# Patient Record
Sex: MALE | Race: WHITE | Employment: OTHER | URBAN - METROPOLITAN AREA
[De-identification: names, ages, dates, MRNs, and addresses within clinical notes are randomized per-mention and may not be internally consistent; named-entity substitution may affect disease eponyms.]

---
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
## 2017-05-01 PROBLEM — S01.01XA SCALP LACERATION: Status: ACTIVE | Noted: 2017-05-01

## 2017-05-02 PROBLEM — S22.000A CLOSED COMPRESSION FRACTURE OF THORACIC VERTEBRA (HCC): Chronic | Status: ACTIVE | Noted: 2017-05-02

## 2017-05-02 PROBLEM — I10 HTN (HYPERTENSION), BENIGN: Chronic | Status: ACTIVE | Noted: 2017-05-02

## 2017-05-02 PROBLEM — I65.29 CAROTID STENOSIS: Chronic | Status: ACTIVE | Noted: 2017-05-02

## 2017-05-02 PROBLEM — Z86.711 HISTORY OF PULMONARY EMBOLISM: Chronic | Status: ACTIVE | Noted: 2017-05-02

## 2017-05-02 PROBLEM — E06.3 AUTOIMMUNE HYPOTHYROIDISM: Chronic | Status: ACTIVE | Noted: 2017-05-02

## 2018-07-09 ENCOUNTER — HOSPITAL ENCOUNTER (OUTPATIENT)
Age: 83
Discharge: HOME | End: 2018-07-11

## 2018-07-09 LAB
PROSTATE SPECIFIC ANTIGEN: 2.58 NG/ML (ref 0–4)
TESTOSTERONE TOTAL: <2.5 NG/DL

## 2018-07-09 PROCEDURE — 84153 ASSAY OF PSA TOTAL: CPT

## 2018-07-09 PROCEDURE — 84403 ASSAY OF TOTAL TESTOSTERONE: CPT

## 2019-01-14 ENCOUNTER — HOSPITAL ENCOUNTER (OUTPATIENT)
Age: 84
Discharge: HOME | End: 2019-01-16
Payer: MEDICARE

## 2019-01-14 LAB
PROSTATE SPECIFIC ANTIGEN: 2.56 NG/ML (ref 0–4)
TESTOSTERONE TOTAL: <2.5 NG/DL

## 2019-01-14 PROCEDURE — 84153 ASSAY OF PSA TOTAL: CPT

## 2019-01-14 PROCEDURE — 84403 ASSAY OF TOTAL TESTOSTERONE: CPT

## 2019-06-26 ENCOUNTER — HOSPITAL ENCOUNTER (OUTPATIENT)
Age: 84
Discharge: HOME | End: 2019-06-28
Payer: MEDICARE

## 2019-06-26 LAB — PROSTATE SPECIFIC ANTIGEN: 3.6 NG/ML (ref 0–4)

## 2019-06-26 PROCEDURE — 84153 ASSAY OF PSA TOTAL: CPT

## 2019-07-31 RX ORDER — LISINOPRIL 5 MG/1
5 TABLET ORAL DAILY
Qty: 30 TABLET | Refills: 5 | Status: SHIPPED | OUTPATIENT
Start: 2019-07-31 | End: 2019-10-22 | Stop reason: SDUPTHER

## 2019-07-31 RX ORDER — APIXABAN 2.5 MG/1
2.5 TABLET, FILM COATED ORAL
Refills: 3 | COMMUNITY
Start: 2019-04-29 | End: 2019-07-31 | Stop reason: SDUPTHER

## 2019-07-31 NOTE — TELEPHONE ENCOUNTER
Patient requesting refills for meds. Previous Chr[facility] pt. Has not scheduled with new PCP yet. Meds pended to you.

## 2019-08-26 ENCOUNTER — TELEPHONE (OUTPATIENT)
Dept: ADMINISTRATIVE | Age: 84
End: 2019-08-26

## 2019-08-26 ENCOUNTER — HOSPITAL ENCOUNTER (OUTPATIENT)
Age: 84
Discharge: HOME | End: 2019-08-28
Payer: MEDICARE

## 2019-08-26 ENCOUNTER — OFFICE VISIT (OUTPATIENT)
Dept: FAMILY MEDICINE CLINIC | Age: 84
End: 2019-08-26
Payer: MEDICARE

## 2019-08-26 VITALS
SYSTOLIC BLOOD PRESSURE: 124 MMHG | DIASTOLIC BLOOD PRESSURE: 70 MMHG | OXYGEN SATURATION: 98 % | HEART RATE: 95 BPM | RESPIRATION RATE: 16 BRPM | BODY MASS INDEX: 19.3 KG/M2 | TEMPERATURE: 97.7 F | WEIGHT: 134.8 LBS | HEIGHT: 70 IN

## 2019-08-26 DIAGNOSIS — R21 RASH AND NONSPECIFIC SKIN ERUPTION: ICD-10-CM

## 2019-08-26 DIAGNOSIS — R31.9 URINARY TRACT INFECTION WITH HEMATURIA, SITE UNSPECIFIED: ICD-10-CM

## 2019-08-26 DIAGNOSIS — R30.0 DYSURIA: Primary | ICD-10-CM

## 2019-08-26 DIAGNOSIS — R30.0 DYSURIA: ICD-10-CM

## 2019-08-26 DIAGNOSIS — N39.0 URINARY TRACT INFECTION WITH HEMATURIA, SITE UNSPECIFIED: ICD-10-CM

## 2019-08-26 LAB
BILIRUBIN, POC: ABNORMAL
BLOOD URINE, POC: ABNORMAL
CLARITY, POC: CLEAR
COLOR, POC: YELLOW
GLUCOSE URINE, POC: ABNORMAL
KETONES, POC: ABNORMAL
LEUKOCYTE EST, POC: ABNORMAL
NITRITE, POC: POSITIVE
PH, POC: 6
PROTEIN, POC: 30
SPECIFIC GRAVITY, POC: 1.03
UROBILINOGEN, POC: 0.2

## 2019-08-26 PROCEDURE — 87186 SC STD MICRODIL/AGAR DIL: CPT

## 2019-08-26 PROCEDURE — 99214 OFFICE O/P EST MOD 30 MIN: CPT | Performed by: PHYSICIAN ASSISTANT

## 2019-08-26 PROCEDURE — 87077 CULTURE AEROBIC IDENTIFY: CPT

## 2019-08-26 PROCEDURE — 81003 URINALYSIS AUTO W/O SCOPE: CPT | Performed by: PHYSICIAN ASSISTANT

## 2019-08-26 PROCEDURE — 87088 URINE BACTERIA CULTURE: CPT

## 2019-08-26 RX ORDER — CIPROFLOXACIN 500 MG/1
500 TABLET, FILM COATED ORAL 2 TIMES DAILY
COMMUNITY
End: 2019-09-11 | Stop reason: CLARIF

## 2019-08-26 RX ORDER — CLOTRIMAZOLE AND BETAMETHASONE DIPROPIONATE 10; .64 MG/G; MG/G
CREAM TOPICAL
Qty: 45 G | Refills: 0 | Status: SHIPPED | OUTPATIENT
Start: 2019-08-26 | End: 2019-09-11 | Stop reason: CLARIF

## 2019-08-26 RX ORDER — PHENAZOPYRIDINE HYDROCHLORIDE 100 MG/1
TABLET, FILM COATED ORAL
Refills: 0 | COMMUNITY
Start: 2019-08-18 | End: 2019-09-11 | Stop reason: CLARIF

## 2019-08-26 NOTE — PROGRESS NOTES
19  Helen Talavera : 1928 Sex: male  Age 80 y.o. Subjective:  Chief Complaint   Patient presents with    Cystitis     has reoccuring bladder infections, has been to urgernt care twice     Rash     inside of left leg. HPI:   Helen Talavera , 80 y.o. male presents to [facility] for evaluation of possible urinary tract infection and a rash. The patient is here for 2 separate complaints. 1.:  The patient states that he has had a urinary tract infection intermittently over the last couple of weeks. 2 weeks ago he was evaluated at a another urgent care and was diagnosed with a UTI and placed on Macrobid. The patient states that he returned this past  and was told that he still had a urinary tract infection they put him on ciprofloxacin. He has had 2 days of the ciprofloxacin and he wanted to make sure that it was resolving at this point. We had a thorough discussion that the antibiotic may take longer than 3 doses to see if it does not help. The patient will provide us with a urine sample here. 2.:  The patient has a rash to the left medial leg. The patient states that it does itch. The patient denies any pain or burning. The patient is not having any fevers, chills. The patient denies any red streaking. The patient states that it does itch. The patient denies any vesicles. ROS:   Unless otherwise stated in this report the patient's positive and negative responses for review of systems for constitutional, eyes, ENT, cardiovascular, respiratory, gastrointestinal, neurological, , musculoskeletal, and integument systems and related systems to the presenting problem are either stated in the history of present illness or were not pertinent or were negative for the symptoms and/or complaints related to the presenting medical problem. Positives and pertinent negatives as per HPI. All others reviewed and are negative.       PMH:     Past Medical History:

## 2019-08-26 NOTE — TELEPHONE ENCOUNTER
Pt wife called and would like her  and herself to establish  with you they are former Dr. Tanna Robledo office.  Please advise thanks

## 2019-08-29 LAB — URINE CULTURE, ROUTINE: NORMAL

## 2019-09-11 ENCOUNTER — HOSPITAL ENCOUNTER (OUTPATIENT)
Age: 84
Discharge: HOME | End: 2019-09-13
Payer: MEDICARE

## 2019-09-11 ENCOUNTER — OFFICE VISIT (OUTPATIENT)
Dept: FAMILY MEDICINE CLINIC | Age: 84
End: 2019-09-11
Payer: MEDICARE

## 2019-09-11 VITALS
HEIGHT: 70 IN | OXYGEN SATURATION: 97 % | SYSTOLIC BLOOD PRESSURE: 124 MMHG | HEART RATE: 84 BPM | WEIGHT: 159 LBS | DIASTOLIC BLOOD PRESSURE: 60 MMHG | BODY MASS INDEX: 22.76 KG/M2

## 2019-09-11 DIAGNOSIS — Z86.711 HISTORY OF PULMONARY EMBOLISM: Chronic | ICD-10-CM

## 2019-09-11 DIAGNOSIS — D64.9 ANEMIA, UNSPECIFIED TYPE: ICD-10-CM

## 2019-09-11 DIAGNOSIS — Z79.01 ANTICOAGULANT LONG-TERM USE: ICD-10-CM

## 2019-09-11 DIAGNOSIS — I10 HTN (HYPERTENSION), BENIGN: Primary | Chronic | ICD-10-CM

## 2019-09-11 DIAGNOSIS — C61 PROSTATE CANCER (HCC): ICD-10-CM

## 2019-09-11 DIAGNOSIS — E03.9 ACQUIRED HYPOTHYROIDISM: ICD-10-CM

## 2019-09-11 DIAGNOSIS — H91.93 BILATERAL HEARING LOSS, UNSPECIFIED HEARING LOSS TYPE: ICD-10-CM

## 2019-09-11 LAB
ALBUMIN SERPL-MCNC: 3.9 G/DL (ref 3.5–5.2)
ALP BLD-CCNC: 107 U/L (ref 40–129)
ALT SERPL-CCNC: 41 U/L (ref 0–40)
ANION GAP SERPL CALCULATED.3IONS-SCNC: 11 MMOL/L (ref 7–16)
AST SERPL-CCNC: 38 U/L (ref 0–39)
BILIRUB SERPL-MCNC: 0.4 MG/DL (ref 0–1.2)
BUN BLDV-MCNC: 25 MG/DL (ref 8–23)
CALCIUM SERPL-MCNC: 9.5 MG/DL (ref 8.6–10.2)
CHLORIDE BLD-SCNC: 105 MMOL/L (ref 98–107)
CO2: 26 MMOL/L (ref 22–29)
CREAT SERPL-MCNC: 0.8 MG/DL (ref 0.7–1.2)
GFR AFRICAN AMERICAN: >60
GFR NON-AFRICAN AMERICAN: >60 ML/MIN/1.73
GLUCOSE BLD-MCNC: 105 MG/DL (ref 74–99)
HCT VFR BLD CALC: 40.2 % (ref 37–54)
HEMOGLOBIN: 12.3 G/DL (ref 12.5–16.5)
MCH RBC QN AUTO: 28.3 PG (ref 26–35)
MCHC RBC AUTO-ENTMCNC: 30.6 % (ref 32–34.5)
MCV RBC AUTO: 92.4 FL (ref 80–99.9)
PDW BLD-RTO: 15.5 FL (ref 11.5–15)
PLATELET # BLD: 247 E9/L (ref 130–450)
PMV BLD AUTO: 10.1 FL (ref 7–12)
POTASSIUM SERPL-SCNC: 4.7 MMOL/L (ref 3.5–5)
RBC # BLD: 4.35 E12/L (ref 3.8–5.8)
SODIUM BLD-SCNC: 142 MMOL/L (ref 132–146)
TOTAL PROTEIN: 7 G/DL (ref 6.4–8.3)
TSH SERPL DL<=0.05 MIU/L-ACNC: 1.38 UIU/ML (ref 0.27–4.2)
WBC # BLD: 4.9 E9/L (ref 4.5–11.5)

## 2019-09-11 PROCEDURE — 36415 COLL VENOUS BLD VENIPUNCTURE: CPT

## 2019-09-11 PROCEDURE — 99215 OFFICE O/P EST HI 40 MIN: CPT | Performed by: INTERNAL MEDICINE

## 2019-09-11 PROCEDURE — 80053 COMPREHEN METABOLIC PANEL: CPT

## 2019-09-11 PROCEDURE — 84443 ASSAY THYROID STIM HORMONE: CPT

## 2019-09-11 PROCEDURE — 85027 COMPLETE CBC AUTOMATED: CPT

## 2019-09-11 RX ORDER — LEVOTHYROXINE SODIUM 112 UG/1
1 CAPSULE ORAL
COMMUNITY
End: 2019-09-11 | Stop reason: CLARIF

## 2019-09-11 RX ORDER — MAGNESIUM GLUCONATE 27 MG(500)
500 TABLET ORAL 2 TIMES DAILY
COMMUNITY

## 2019-09-11 RX ORDER — HYDROCODONE BITARTRATE AND ACETAMINOPHEN 5; 325 MG/1; MG/1
TABLET ORAL
COMMUNITY
End: 2019-09-11 | Stop reason: CLARIF

## 2019-09-11 RX ORDER — CALCIUM CARBONATE 500(1250)
500 TABLET ORAL DAILY
COMMUNITY

## 2019-09-11 RX ORDER — LEVOTHYROXINE SODIUM 0.1 MG/1
TABLET ORAL
COMMUNITY
Start: 2019-01-08 | End: 2019-11-07 | Stop reason: SDUPTHER

## 2019-09-11 SDOH — HEALTH STABILITY: PHYSICAL HEALTH: ON AVERAGE, HOW MANY DAYS PER WEEK DO YOU ENGAGE IN MODERATE TO STRENUOUS EXERCISE (LIKE A BRISK WALK)?: 0 DAYS

## 2019-09-11 SDOH — HEALTH STABILITY: MENTAL HEALTH: HOW OFTEN DO YOU HAVE A DRINK CONTAINING ALCOHOL?: 4 OR MORE TIMES A WEEK

## 2019-09-11 SDOH — HEALTH STABILITY: MENTAL HEALTH: HOW MANY STANDARD DRINKS CONTAINING ALCOHOL DO YOU HAVE ON A TYPICAL DAY?: 1 OR 2

## 2019-09-11 ASSESSMENT — PATIENT HEALTH QUESTIONNAIRE - PHQ9
SUM OF ALL RESPONSES TO PHQ9 QUESTIONS 1 & 2: 0
1. LITTLE INTEREST OR PLEASURE IN DOING THINGS: 0
2. FEELING DOWN, DEPRESSED OR HOPELESS: 0
SUM OF ALL RESPONSES TO PHQ QUESTIONS 1-9: 0
SUM OF ALL RESPONSES TO PHQ QUESTIONS 1-9: 0

## 2019-09-11 ASSESSMENT — ENCOUNTER SYMPTOMS
RESPIRATORY NEGATIVE: 1
GASTROINTESTINAL NEGATIVE: 1
ALLERGIC/IMMUNOLOGIC NEGATIVE: 1

## 2019-10-22 RX ORDER — LISINOPRIL 5 MG/1
5 TABLET ORAL DAILY
Qty: 30 TABLET | Refills: 1 | Status: SHIPPED | OUTPATIENT
Start: 2019-10-22 | End: 2019-11-27 | Stop reason: SDUPTHER

## 2019-10-29 RX ORDER — HYDROCODONE BITARTRATE AND ACETAMINOPHEN 5; 325 MG/1; MG/1
1 TABLET ORAL EVERY 6 HOURS PRN
COMMUNITY
End: 2020-08-10 | Stop reason: ALTCHOICE

## 2019-11-07 RX ORDER — LEVOTHYROXINE SODIUM 0.1 MG/1
100 TABLET ORAL DAILY
Qty: 30 TABLET | Refills: 0 | Status: SHIPPED | OUTPATIENT
Start: 2019-11-07 | End: 2019-11-07 | Stop reason: SDUPTHER

## 2019-11-18 ENCOUNTER — HOSPITAL ENCOUNTER (OUTPATIENT)
Age: 84
Discharge: HOME | End: 2019-11-20
Payer: MEDICARE

## 2019-11-18 LAB — PROSTATE SPECIFIC ANTIGEN: 3.07 NG/ML (ref 0–4)

## 2019-11-18 PROCEDURE — 84153 ASSAY OF PSA TOTAL: CPT

## 2019-11-23 RX ORDER — LEVOTHYROXINE SODIUM 0.1 MG/1
100 TABLET ORAL DAILY
Qty: 90 TABLET | Refills: 0 | Status: SHIPPED | OUTPATIENT
Start: 2019-11-23 | End: 2019-11-27 | Stop reason: SDUPTHER

## 2019-11-27 ENCOUNTER — HOSPITAL ENCOUNTER (OUTPATIENT)
Age: 84
Discharge: HOME | End: 2019-11-29
Payer: MEDICARE

## 2019-11-27 ENCOUNTER — OFFICE VISIT (OUTPATIENT)
Dept: FAMILY MEDICINE CLINIC | Age: 84
End: 2019-11-27
Payer: MEDICARE

## 2019-11-27 VITALS
HEART RATE: 75 BPM | DIASTOLIC BLOOD PRESSURE: 70 MMHG | OXYGEN SATURATION: 95 % | BODY MASS INDEX: 19.94 KG/M2 | SYSTOLIC BLOOD PRESSURE: 122 MMHG | WEIGHT: 139 LBS

## 2019-11-27 DIAGNOSIS — E03.9 ACQUIRED HYPOTHYROIDISM: ICD-10-CM

## 2019-11-27 DIAGNOSIS — I65.29 STENOSIS OF CAROTID ARTERY, UNSPECIFIED LATERALITY: Primary | Chronic | ICD-10-CM

## 2019-11-27 DIAGNOSIS — I10 ESSENTIAL HYPERTENSION: Chronic | ICD-10-CM

## 2019-11-27 DIAGNOSIS — D50.0 IRON DEFICIENCY ANEMIA DUE TO CHRONIC BLOOD LOSS: ICD-10-CM

## 2019-11-27 LAB
ALBUMIN SERPL-MCNC: 4 G/DL (ref 3.5–5.2)
ALP BLD-CCNC: 96 U/L (ref 40–129)
ALT SERPL-CCNC: 16 U/L (ref 0–40)
ANION GAP SERPL CALCULATED.3IONS-SCNC: 14 MMOL/L (ref 7–16)
AST SERPL-CCNC: 23 U/L (ref 0–39)
BASOPHILS ABSOLUTE: 0.02 E9/L (ref 0–0.2)
BASOPHILS RELATIVE PERCENT: 0.4 % (ref 0–2)
BILIRUB SERPL-MCNC: 0.3 MG/DL (ref 0–1.2)
BUN BLDV-MCNC: 24 MG/DL (ref 8–23)
CALCIUM SERPL-MCNC: 9.6 MG/DL (ref 8.6–10.2)
CHLORIDE BLD-SCNC: 107 MMOL/L (ref 98–107)
CO2: 22 MMOL/L (ref 22–29)
CREAT SERPL-MCNC: 0.8 MG/DL (ref 0.7–1.2)
EOSINOPHILS ABSOLUTE: 0.14 E9/L (ref 0.05–0.5)
EOSINOPHILS RELATIVE PERCENT: 2.7 % (ref 0–6)
FERRITIN: 220 NG/ML
GFR AFRICAN AMERICAN: >60
GFR NON-AFRICAN AMERICAN: >60 ML/MIN/1.73
GLUCOSE BLD-MCNC: 103 MG/DL (ref 74–99)
HCT VFR BLD CALC: 42.8 % (ref 37–54)
HEMOGLOBIN: 12.8 G/DL (ref 12.5–16.5)
IMMATURE GRANULOCYTES #: 0.01 E9/L
IMMATURE GRANULOCYTES %: 0.2 % (ref 0–5)
IRON SATURATION: 31 % (ref 20–55)
IRON: 106 MCG/DL (ref 59–158)
LYMPHOCYTES ABSOLUTE: 2.2 E9/L (ref 1.5–4)
LYMPHOCYTES RELATIVE PERCENT: 41.7 % (ref 20–42)
MCH RBC QN AUTO: 28.1 PG (ref 26–35)
MCHC RBC AUTO-ENTMCNC: 29.9 % (ref 32–34.5)
MCV RBC AUTO: 93.9 FL (ref 80–99.9)
MONOCYTES ABSOLUTE: 0.43 E9/L (ref 0.1–0.95)
MONOCYTES RELATIVE PERCENT: 8.1 % (ref 2–12)
NEUTROPHILS ABSOLUTE: 2.48 E9/L (ref 1.8–7.3)
NEUTROPHILS RELATIVE PERCENT: 46.9 % (ref 43–80)
PDW BLD-RTO: 15.4 FL (ref 11.5–15)
PLATELET # BLD: 237 E9/L (ref 130–450)
PMV BLD AUTO: 10.1 FL (ref 7–12)
POTASSIUM SERPL-SCNC: 4.5 MMOL/L (ref 3.5–5)
RBC # BLD: 4.56 E12/L (ref 3.8–5.8)
SODIUM BLD-SCNC: 143 MMOL/L (ref 132–146)
TOTAL IRON BINDING CAPACITY: 346 MCG/DL (ref 250–450)
TOTAL PROTEIN: 7.1 G/DL (ref 6.4–8.3)
WBC # BLD: 5.3 E9/L (ref 4.5–11.5)

## 2019-11-27 PROCEDURE — 36415 COLL VENOUS BLD VENIPUNCTURE: CPT

## 2019-11-27 PROCEDURE — 85025 COMPLETE CBC W/AUTO DIFF WBC: CPT

## 2019-11-27 PROCEDURE — 80053 COMPREHEN METABOLIC PANEL: CPT

## 2019-11-27 PROCEDURE — 83550 IRON BINDING TEST: CPT

## 2019-11-27 PROCEDURE — 99214 OFFICE O/P EST MOD 30 MIN: CPT | Performed by: INTERNAL MEDICINE

## 2019-11-27 PROCEDURE — 82728 ASSAY OF FERRITIN: CPT

## 2019-11-27 PROCEDURE — 83540 ASSAY OF IRON: CPT

## 2019-11-27 RX ORDER — LISINOPRIL 5 MG/1
5 TABLET ORAL DAILY
Qty: 90 TABLET | Refills: 1 | Status: SHIPPED
Start: 2019-11-27 | End: 2020-07-20

## 2019-11-27 RX ORDER — LEVOTHYROXINE SODIUM 0.1 MG/1
100 TABLET ORAL DAILY
Qty: 90 TABLET | Refills: 1 | Status: SHIPPED | OUTPATIENT
Start: 2019-11-27 | End: 2019-11-27 | Stop reason: SDUPTHER

## 2019-11-27 RX ORDER — LEVOTHYROXINE SODIUM 0.1 MG/1
100 TABLET ORAL DAILY
Qty: 90 TABLET | Refills: 1 | Status: SHIPPED
Start: 2019-11-27 | End: 2021-02-02

## 2019-11-27 ASSESSMENT — ENCOUNTER SYMPTOMS
GASTROINTESTINAL NEGATIVE: 1
ALLERGIC/IMMUNOLOGIC NEGATIVE: 1
RESPIRATORY NEGATIVE: 1

## 2019-12-05 ENCOUNTER — APPOINTMENT (OUTPATIENT)
Dept: CT IMAGING | Age: 84
End: 2019-12-05
Payer: MEDICARE

## 2019-12-05 ENCOUNTER — APPOINTMENT (OUTPATIENT)
Dept: GENERAL RADIOLOGY | Age: 84
End: 2019-12-05
Payer: MEDICARE

## 2019-12-05 ENCOUNTER — HOSPITAL ENCOUNTER (EMERGENCY)
Age: 84
Discharge: HOME | End: 2019-12-05
Attending: EMERGENCY MEDICINE
Payer: MEDICARE

## 2019-12-05 VITALS
DIASTOLIC BLOOD PRESSURE: 65 MMHG | HEIGHT: 68 IN | BODY MASS INDEX: 19.7 KG/M2 | TEMPERATURE: 99.1 F | WEIGHT: 130 LBS | HEART RATE: 82 BPM | OXYGEN SATURATION: 97 % | SYSTOLIC BLOOD PRESSURE: 114 MMHG | RESPIRATION RATE: 16 BRPM

## 2019-12-05 DIAGNOSIS — E86.0 DEHYDRATION: Primary | ICD-10-CM

## 2019-12-05 DIAGNOSIS — R19.7 DIARRHEA, UNSPECIFIED TYPE: ICD-10-CM

## 2019-12-05 LAB
ALBUMIN SERPL-MCNC: 3.8 G/DL (ref 3.5–5.2)
ALP BLD-CCNC: 88 U/L (ref 40–129)
ALT SERPL-CCNC: 15 U/L (ref 0–40)
ANION GAP SERPL CALCULATED.3IONS-SCNC: 11 MMOL/L (ref 7–16)
AST SERPL-CCNC: 20 U/L (ref 0–39)
BACTERIA: NORMAL /HPF
BASOPHILS ABSOLUTE: 0.02 E9/L (ref 0–0.2)
BASOPHILS RELATIVE PERCENT: 0.3 % (ref 0–2)
BILIRUB SERPL-MCNC: 0.5 MG/DL (ref 0–1.2)
BILIRUBIN URINE: NEGATIVE
BLOOD, URINE: ABNORMAL
BUN BLDV-MCNC: 24 MG/DL (ref 8–23)
CALCIUM SERPL-MCNC: 9.2 MG/DL (ref 8.6–10.2)
CHLORIDE BLD-SCNC: 103 MMOL/L (ref 98–107)
CLARITY: CLEAR
CO2: 25 MMOL/L (ref 22–29)
COLOR: YELLOW
CREAT SERPL-MCNC: 0.8 MG/DL (ref 0.7–1.2)
EOSINOPHILS ABSOLUTE: 0 E9/L (ref 0.05–0.5)
EOSINOPHILS RELATIVE PERCENT: 0 % (ref 0–6)
GFR AFRICAN AMERICAN: >60
GFR NON-AFRICAN AMERICAN: >60 ML/MIN/1.73
GLUCOSE BLD-MCNC: 121 MG/DL (ref 74–99)
GLUCOSE URINE: NEGATIVE MG/DL
HCT VFR BLD CALC: 41.4 % (ref 37–54)
HEMOGLOBIN: 13.1 G/DL (ref 12.5–16.5)
IMMATURE GRANULOCYTES #: 0.03 E9/L
IMMATURE GRANULOCYTES %: 0.5 % (ref 0–5)
INR BLD: 1
KETONES, URINE: ABNORMAL MG/DL
LACTIC ACID, SEPSIS: 1.2 MMOL/L (ref 0.5–1.9)
LEUKOCYTE ESTERASE, URINE: NEGATIVE
LYMPHOCYTES ABSOLUTE: 0.42 E9/L (ref 1.5–4)
LYMPHOCYTES RELATIVE PERCENT: 6.4 % (ref 20–42)
MCH RBC QN AUTO: 28.6 PG (ref 26–35)
MCHC RBC AUTO-ENTMCNC: 31.6 % (ref 32–34.5)
MCV RBC AUTO: 90.4 FL (ref 80–99.9)
MONOCYTES ABSOLUTE: 0.39 E9/L (ref 0.1–0.95)
MONOCYTES RELATIVE PERCENT: 6 % (ref 2–12)
NEUTROPHILS ABSOLUTE: 5.69 E9/L (ref 1.8–7.3)
NEUTROPHILS RELATIVE PERCENT: 86.8 % (ref 43–80)
NITRITE, URINE: NEGATIVE
PDW BLD-RTO: 15.2 FL (ref 11.5–15)
PH UA: 5.5 (ref 5–9)
PLATELET # BLD: 227 E9/L (ref 130–450)
PMV BLD AUTO: 9.4 FL (ref 7–12)
POTASSIUM SERPL-SCNC: 4.4 MMOL/L (ref 3.5–5)
PROTEIN UA: NEGATIVE MG/DL
PROTHROMBIN TIME: 11.6 SEC (ref 9.3–12.4)
RBC # BLD: 4.58 E12/L (ref 3.8–5.8)
RBC # BLD: NORMAL 10*6/UL
RBC UA: NORMAL /HPF (ref 0–2)
SODIUM BLD-SCNC: 139 MMOL/L (ref 132–146)
SPECIFIC GRAVITY UA: <=1.005 (ref 1–1.03)
TOTAL PROTEIN: 6.6 G/DL (ref 6.4–8.3)
TROPONIN: <0.01 NG/ML (ref 0–0.03)
UROBILINOGEN, URINE: 0.2 E.U./DL
WBC # BLD: 6.6 E9/L (ref 4.5–11.5)
WBC UA: NORMAL /HPF (ref 0–5)

## 2019-12-05 PROCEDURE — 99284 EMERGENCY DEPT VISIT MOD MDM: CPT

## 2019-12-05 PROCEDURE — 84484 ASSAY OF TROPONIN QUANT: CPT

## 2019-12-05 PROCEDURE — 96360 HYDRATION IV INFUSION INIT: CPT

## 2019-12-05 PROCEDURE — 85025 COMPLETE CBC W/AUTO DIFF WBC: CPT

## 2019-12-05 PROCEDURE — 93005 ELECTROCARDIOGRAM TRACING: CPT | Performed by: EMERGENCY MEDICINE

## 2019-12-05 PROCEDURE — 71045 X-RAY EXAM CHEST 1 VIEW: CPT

## 2019-12-05 PROCEDURE — 74177 CT ABD & PELVIS W/CONTRAST: CPT

## 2019-12-05 PROCEDURE — 85610 PROTHROMBIN TIME: CPT

## 2019-12-05 PROCEDURE — 80053 COMPREHEN METABOLIC PANEL: CPT

## 2019-12-05 PROCEDURE — 6360000004 HC RX CONTRAST MEDICATION: Performed by: RADIOLOGY

## 2019-12-05 PROCEDURE — 96361 HYDRATE IV INFUSION ADD-ON: CPT

## 2019-12-05 PROCEDURE — 83605 ASSAY OF LACTIC ACID: CPT

## 2019-12-05 PROCEDURE — 2580000003 HC RX 258: Performed by: EMERGENCY MEDICINE

## 2019-12-05 PROCEDURE — 81001 URINALYSIS AUTO W/SCOPE: CPT

## 2019-12-05 PROCEDURE — 70450 CT HEAD/BRAIN W/O DYE: CPT

## 2019-12-05 RX ORDER — 0.9 % SODIUM CHLORIDE 0.9 %
500 INTRAVENOUS SOLUTION INTRAVENOUS ONCE
Status: COMPLETED | OUTPATIENT
Start: 2019-12-05 | End: 2019-12-05

## 2019-12-05 RX ADMIN — IOPAMIDOL 110 ML: 755 INJECTION, SOLUTION INTRAVENOUS at 20:11

## 2019-12-05 RX ADMIN — SODIUM CHLORIDE 500 ML: 9 INJECTION, SOLUTION INTRAVENOUS at 19:47

## 2019-12-06 LAB
EKG ATRIAL RATE: 79 BPM
EKG P AXIS: 68 DEGREES
EKG P-R INTERVAL: 182 MS
EKG Q-T INTERVAL: 384 MS
EKG QRS DURATION: 98 MS
EKG QTC CALCULATION (BAZETT): 440 MS
EKG R AXIS: 56 DEGREES
EKG T AXIS: 83 DEGREES
EKG VENTRICULAR RATE: 79 BPM

## 2019-12-06 PROCEDURE — 93010 ELECTROCARDIOGRAM REPORT: CPT | Performed by: INTERNAL MEDICINE

## 2019-12-12 ENCOUNTER — OFFICE VISIT (OUTPATIENT)
Dept: PRIMARY CARE CLINIC | Age: 84
End: 2019-12-12
Payer: MEDICARE

## 2019-12-12 VITALS
TEMPERATURE: 97.6 F | HEIGHT: 66 IN | SYSTOLIC BLOOD PRESSURE: 120 MMHG | RESPIRATION RATE: 18 BRPM | DIASTOLIC BLOOD PRESSURE: 76 MMHG | WEIGHT: 136 LBS | OXYGEN SATURATION: 96 % | BODY MASS INDEX: 21.86 KG/M2 | HEART RATE: 72 BPM

## 2019-12-12 DIAGNOSIS — I10 ESSENTIAL HYPERTENSION: Chronic | ICD-10-CM

## 2019-12-12 DIAGNOSIS — K90.9 DIARRHEA DUE TO MALABSORPTION: ICD-10-CM

## 2019-12-12 DIAGNOSIS — R19.7 DIARRHEA, UNSPECIFIED TYPE: Primary | ICD-10-CM

## 2019-12-12 DIAGNOSIS — R19.7 DIARRHEA DUE TO MALABSORPTION: ICD-10-CM

## 2019-12-12 PROCEDURE — 99213 OFFICE O/P EST LOW 20 MIN: CPT | Performed by: INTERNAL MEDICINE

## 2019-12-12 ASSESSMENT — ENCOUNTER SYMPTOMS
ALLERGIC/IMMUNOLOGIC NEGATIVE: 1
RESPIRATORY NEGATIVE: 1
DIARRHEA: 1

## 2020-01-21 ENCOUNTER — TELEPHONE (OUTPATIENT)
Dept: FAMILY MEDICINE CLINIC | Age: 85
End: 2020-01-21

## 2020-01-21 ENCOUNTER — OFFICE VISIT (OUTPATIENT)
Dept: FAMILY MEDICINE CLINIC | Age: 85
End: 2020-01-21
Payer: MEDICARE

## 2020-01-21 VITALS
HEIGHT: 68 IN | TEMPERATURE: 97.7 F | HEART RATE: 74 BPM | SYSTOLIC BLOOD PRESSURE: 118 MMHG | WEIGHT: 128 LBS | BODY MASS INDEX: 19.4 KG/M2 | DIASTOLIC BLOOD PRESSURE: 74 MMHG | OXYGEN SATURATION: 95 %

## 2020-01-21 LAB
APPEARANCE FLUID: NORMAL
BILIRUBIN, POC: NEGATIVE
BLOOD URINE, POC: NEGATIVE
CLARITY, POC: CLEAR
COLOR, POC: YELLOW
GLUCOSE URINE, POC: NEGATIVE
KETONES, POC: NEGATIVE
LEUKOCYTE EST, POC: NEGATIVE
NITRITE, POC: NEGATIVE
PH, POC: 7
PROTEIN, POC: NEGATIVE
SPECIFIC GRAVITY, POC: 1.02
UROBILINOGEN, POC: 0.2

## 2020-01-21 PROCEDURE — 99213 OFFICE O/P EST LOW 20 MIN: CPT | Performed by: NURSE PRACTITIONER

## 2020-01-21 PROCEDURE — 81002 URINALYSIS NONAUTO W/O SCOPE: CPT | Performed by: NURSE PRACTITIONER

## 2020-01-21 ASSESSMENT — ENCOUNTER SYMPTOMS
WHEEZING: 0
CONSTIPATION: 0
NAUSEA: 0
DIARRHEA: 0
CHEST TIGHTNESS: 0
ABDOMINAL PAIN: 0
SHORTNESS OF BREATH: 0
VOMITING: 0
COUGH: 0
BOWEL INCONTINENCE: 0

## 2020-01-21 NOTE — PROGRESS NOTES
if symptoms change or worsen. Patient verbalized understanding and agreeable to plan of care. All questions answered. Return if symptoms worsen or fail to improve.     Lauren Long, APRN - CNP

## 2020-01-21 NOTE — TELEPHONE ENCOUNTER
Pt's wife called in stating the pt is having extreme pain in his back on the left side. Pt's wife states it could be his kidney. Pt's wife states the pt does not have any other symptoms. Pt has been having this pain the last few days but the pain just became severe today. I advised the pt's wife to take the pt to the ER or to [facility]. Pt's wife stares she is going to try to bring him into [facility].

## 2020-01-22 NOTE — TELEPHONE ENCOUNTER
Wife notified was seen at [facility] yesterday. The provider informed him he had a pulled muscle.  I did inform her if the pain is still severe and bothersm to go to ER

## 2020-01-23 RX ORDER — APIXABAN 2.5 MG/1
TABLET, FILM COATED ORAL
Qty: 60 TABLET | Refills: 5 | Status: SHIPPED
Start: 2020-01-23 | End: 2020-10-02 | Stop reason: SDUPTHER

## 2020-03-13 ENCOUNTER — HOSPITAL ENCOUNTER (OUTPATIENT)
Age: 85
Discharge: HOME | End: 2020-03-15
Payer: MEDICARE

## 2020-03-13 LAB — PROSTATE SPECIFIC ANTIGEN: 2.67 NG/ML (ref 0–4)

## 2020-03-13 PROCEDURE — 84153 ASSAY OF PSA TOTAL: CPT

## 2020-04-10 ENCOUNTER — VIRTUAL VISIT (OUTPATIENT)
Dept: PRIMARY CARE CLINIC | Age: 85
End: 2020-04-10
Payer: MEDICARE

## 2020-04-10 PROBLEM — N34.2 INFECTIVE URETHRITIS: Status: ACTIVE | Noted: 2020-04-10

## 2020-04-10 PROBLEM — R63.4 WEIGHT LOSS: Status: ACTIVE | Noted: 2020-04-10

## 2020-04-10 PROBLEM — K59.1 FUNCTIONAL DIARRHEA: Status: ACTIVE | Noted: 2020-04-10

## 2020-04-10 PROBLEM — M19.90 ARTHRITIS: Status: ACTIVE | Noted: 2020-04-10

## 2020-04-10 PROCEDURE — G2012 BRIEF CHECK IN BY MD/QHP: HCPCS | Performed by: INTERNAL MEDICINE

## 2020-04-10 RX ORDER — CIPROFLOXACIN 250 MG/1
250 TABLET, FILM COATED ORAL 2 TIMES DAILY
Qty: 14 TABLET | Refills: 0 | Status: SHIPPED | OUTPATIENT
Start: 2020-04-10 | End: 2020-04-17

## 2020-04-10 ASSESSMENT — PATIENT HEALTH QUESTIONNAIRE - PHQ9
SUM OF ALL RESPONSES TO PHQ QUESTIONS 1-9: 0
SUM OF ALL RESPONSES TO PHQ9 QUESTIONS 1 & 2: 0
2. FEELING DOWN, DEPRESSED OR HOPELESS: 0
1. LITTLE INTEREST OR PLEASURE IN DOING THINGS: 0
SUM OF ALL RESPONSES TO PHQ QUESTIONS 1-9: 0

## 2020-04-10 ASSESSMENT — ENCOUNTER SYMPTOMS
ALLERGIC/IMMUNOLOGIC NEGATIVE: 1
DIARRHEA: 1
RESPIRATORY NEGATIVE: 1

## 2020-04-10 NOTE — PROGRESS NOTES
to Visit Medications    Medication Sig Taking? Authorizing Provider   ELIQUIS 2.5 MG TABS tablet TAKE 1 TABLET BY MOUTH TWICE DAILY Yes Gwen Galindo MD   lisinopril (PRINIVIL;ZESTRIL) 5 MG tablet Take 1 tablet by mouth daily Yes Gwen Galindo MD   levothyroxine (SYNTHROID) 100 MCG tablet Take 1 tablet by mouth Daily Yes Gwen Galindo MD   Handicap Placard MISC by Does not apply route PERMANENT Yes Historical Provider, MD   HYDROcodone-acetaminophen (NORCO) 5-325 MG per tablet Take 1 tablet by mouth every 6 hours as needed for Pain. Yes Historical Provider, MD   calcium carbonate (OSCAL) 500 MG TABS tablet Take 500 mg by mouth daily Yes Historical Provider, MD   B Complex-C (SUPER B COMPLEX PO) Take by mouth Yes Historical Provider, MD   magnesium gluconate (MAGONATE) 500 MG tablet Take 500 mg by mouth 2 times daily Yes Historical Provider, MD   leuprolide (LUPRON) 45 MG injection Inject 45 mg into the muscle Yes Historical Provider, MD   acetaminophen (TYLENOL) 325 MG tablet Take 650 mg by mouth every 6 hours as needed for Pain Yes Historical Provider, MD        Allergies   Allergen Reactions    Eggs Or Egg-Derived Products     Eggs [Egg White] Other (See Comments)     Violently ill for 1 week to 10 days.  No flu shots    Sulfa Antibiotics Other (See Comments)     Weakness, violently ill    Sulfa Antibiotics        Past Medical History:   Diagnosis Date    History of prostate cancer 1970    radiation for 1 month and then seed implants     History of spinal fracture     [place] (hard of hearing)     Hypertension     Hypothyroidism 2004    Leg cramps     Prostate cancer ([address]     Pulmonary emboli ([address]        Past Surgical History:   Procedure Laterality Date    COLONOSCOPY  2014    [facility]    HAND TENDON SURGERY      HERNIA REPAIR  age 15   [facility]  6/7/12    RIGHT , WITH MESH    KNEE ARTHROSCOPY      MASTOID SURGERY      OTHER SURGICAL HISTORY  approx 8 yrs ago    prostate radiation and implanted seeds    OTHER SURGICAL HISTORY Right 04/30/2017    head laceration        Social History     Socioeconomic History    Marital status:      Spouse name: Not on file    Number of children: Not on file    Years of education: Not on file    Highest education level: Not on file   Occupational History    Occupation: business owner   Social Needs    Financial resource strain: Not on file    Food insecurity     Worry: Not on file     Inability: Not on file   [employer] needs     Medical: Not on file     Non-medical: Not on file   Tobacco Use    Smoking status: Never Smoker    Smokeless tobacco: Never Used   Substance and Sexual Activity    Alcohol use: Yes     Frequency: 4 or more times a week     Drinks per session: 1 or 2     Comment: socially    Drug use: No    Sexual activity: Not Currently     Partners: Female   Lifestyle    Physical activity     Days per week: 0 days     Minutes per session: Not on file    Stress: Not on file   Relationships    Social connections     Talks on phone: Not on file     Gets together: Not on file     Attends Yazidism service: Not on file     Active member of club or organization: Not on file     Attends meetings of clubs or organizations: Not on file     Relationship status: Not on file    Intimate partner violence     Fear of current or ex partner: Not on file     Emotionally abused: Not on file     Physically abused: Not on file     Forced sexual activity: Not on file   Other Topics Concern    Not on file   Social History Narrative    ** Merged History Encounter **    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 He has been on the same location [place] [address] for many years. His wife has had a stroke and he is the primary caregiver.         Family History   Problem Relation Age of Onset    Heart Attack Daughter     Stroke Mother     Cancer Father         lung, smoking     Cancer Brother         throat, smoking

## 2020-07-06 ENCOUNTER — HOSPITAL ENCOUNTER (OUTPATIENT)
Age: 85
Discharge: HOME | End: 2020-07-08
Payer: MEDICARE

## 2020-07-06 LAB — PROSTATE SPECIFIC ANTIGEN: 2.2 NG/ML (ref 0–4)

## 2020-07-06 PROCEDURE — 84153 ASSAY OF PSA TOTAL: CPT

## 2020-07-20 RX ORDER — LISINOPRIL 5 MG/1
5 TABLET ORAL DAILY
Qty: 90 TABLET | Refills: 1 | Status: SHIPPED
Start: 2020-07-20 | End: 2020-10-27

## 2020-08-10 ENCOUNTER — OFFICE VISIT (OUTPATIENT)
Dept: PRIMARY CARE CLINIC | Age: 85
End: 2020-08-10
Payer: MEDICARE

## 2020-08-10 VITALS
OXYGEN SATURATION: 97 % | TEMPERATURE: 97 F | HEIGHT: 68 IN | SYSTOLIC BLOOD PRESSURE: 110 MMHG | DIASTOLIC BLOOD PRESSURE: 60 MMHG | RESPIRATION RATE: 18 BRPM | HEART RATE: 81 BPM | WEIGHT: 134 LBS | BODY MASS INDEX: 20.31 KG/M2

## 2020-08-10 PROCEDURE — 99215 OFFICE O/P EST HI 40 MIN: CPT | Performed by: INTERNAL MEDICINE

## 2020-08-10 PROCEDURE — 93000 ELECTROCARDIOGRAM COMPLETE: CPT | Performed by: INTERNAL MEDICINE

## 2020-08-10 ASSESSMENT — ENCOUNTER SYMPTOMS
RESPIRATORY NEGATIVE: 1
ALLERGIC/IMMUNOLOGIC NEGATIVE: 1
DIARRHEA: 1

## 2020-08-10 NOTE — PROGRESS NOTES
8/10/2020    Daksha Daugherty (:  1928) is a 80 y.o. male, here for evaluation of the following medical concerns:    Patient was seen by Dr. Jose Roberto Lyn. Patient initially was not following instructions given by Dr. Jose Roberto Lyn. I have noted memory loss with the patient as has Dr. Jose Roberto Lyn. According to the wife there is no memory loss. I am really not quite sure that is apparent. Patient recently had evaluation by Dr. Jose Roberto Lyn and did get written instructions which have helped his explosive episodes of diarrhea. Patient also has had some chest discomfort. This occurred at rest.  It is not exacerbated by walking or exertion. Is occurred about once every 6 months. Patient is elderly male with a history of hypertension which is longstanding. Patient's energy level has been less prominent over the last 6 months or so. Diarrhea            Review of Systems   Constitutional: Negative for activity change. HENT: Negative. Eyes:        His macular degeneration. [facility]. Respiratory: Negative. Cardiovascular:        Patient has valvular heart disease by auscultation and by echo done in 2016. He is not symptomatic. Gastrointestinal: Positive for diarrhea. Diarrhea is intermittent but explosive to the point of near syncope or syncope. Recent evaluation in the emergency room. Endocrine: Negative. Genitourinary:        History of prostate cancer. Currently not symptomatic. Followed by Dr. Leopold Oliphant. Musculoskeletal:        Low back pain which is relieved by acupuncture. Has generalized arthritis. Allergic/Immunologic: Negative. Neurological: Negative. Hematological:        History of previous anemia. Had been placed on iron therapy back in 2016. Psychiatric/Behavioral: Negative. Prior to Visit Medications    Medication Sig Taking?  Authorizing Provider   lisinopril (PRINIVIL;ZESTRIL) 5 MG tablet TAKE 1 TABLET BY MOUTH DAILY Yes Viv Glover MD   ELIQUIS 2.5 MG TABS tablet TAKE 1 TABLET BY MOUTH TWICE DAILY Yes Viv Glover MD   levothyroxine (SYNTHROID) 100 MCG tablet Take 1 tablet by mouth Daily Yes Viv Glover MD   Handicap Placard MISC by Does not apply route PERMANENT Yes Historical Provider, MD   calcium carbonate (OSCAL) 500 MG TABS tablet Take 500 mg by mouth daily Yes Historical Provider, MD   B Complex-C (SUPER B COMPLEX PO) Take by mouth Yes Historical Provider, MD   magnesium gluconate (MAGONATE) 500 MG tablet Take 500 mg by mouth 2 times daily Yes Historical Provider, MD   leuprolide (LUPRON) 45 MG injection Inject 45 mg into the muscle Yes Historical Provider, MD   acetaminophen (TYLENOL) 325 MG tablet Take 650 mg by mouth every 6 hours as needed for Pain Yes Historical Provider, MD        Allergies   Allergen Reactions    Eggs Or Egg-Derived Products     Eggs [Egg White] Other (See Comments)     Violently ill for 1 week to 10 days.  No flu shots    Sulfa Antibiotics Other (See Comments)     Weakness, violently ill    Sulfa Antibiotics        Past Medical History:   Diagnosis Date    History of prostate cancer 1970    radiation for 1 month and then seed implants     History of spinal fracture     [place] (hard of hearing)     Hypertension     Hypothyroidism 2004    Leg cramps     [facility]     Pulmonary emboli ([facility])        Past Surgical History:   Procedure Laterality Date    COLONOSCOPY  2014    [facility]    HAND TENDON SURGERY      HERNIA REPAIR  age 15   [facility]  6/7/12    RIGHT , WITH MESH    KNEE ARTHROSCOPY      MASTOID SURGERY      OTHER SURGICAL HISTORY  approx 8 yrs ago    prostate radiation and implanted seeds    OTHER SURGICAL HISTORY Right 04/30/2017    head laceration        Social History     Socioeconomic History    Marital status:      Spouse name: Not on file    Number of children: Not on file    Years of education: Not on file    Highest education level: Not on file   Occupational History    Occupation: business owner   Social Needs    Financial resource strain: Not on file    Food insecurity     Worry: Not on file     Inability: Not on file   [employer] needs     Medical: Not on file     Non-medical: Not on file   Tobacco Use    Smoking status: Never Smoker    Smokeless tobacco: Never Used   Substance and Sexual Activity    Alcohol use: Yes     Frequency: 4 or more times a week     Drinks per session: 1 or 2     Comment: socially    Drug use: No    Sexual activity: Not Currently     Partners: Female   Lifestyle    Physical activity     Days per week: 0 days     Minutes per session: Not on file    Stress: Not on file   Relationships    Social connections     Talks on phone: Not on file     Gets together: Not on file     Attends Yazidi service: Not on file     Active member of club or organization: Not on file     Attends meetings of clubs or organizations: Not on file     Relationship status: Not on file    Intimate partner violence     Fear of current or ex partner: Not on file     Emotionally abused: Not on file     Physically abused: Not on file     Forced sexual activity: Not on file   Other Topics Concern    Not on file   Social History Narrative    ** Merged History Encounter **    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 He has been on the same location on [address] for many years. His wife has had a stroke and he is the primary caregiver.         Family History   Problem Relation Age of Onset    Heart Attack Daughter     Stroke Mother     Cancer Father         lung, smoking     Cancer Brother         throat, smoking        Vitals:    08/10/20 1456   BP: 110/60   Pulse: 81   Resp: 18   Temp: 97 °F (36.1 °C)   SpO2: 97%   Weight: 134 lb (60.8 kg)   Height: 5' 8\" (1.727 m)     Estimated body mass index is 20.37 kg/m² as calculated from the following:    Height as of this encounter: 5' 8\" (1.727 m). Weight as of this encounter: 134 lb (60.8 kg). Physical Exam  Constitutional:       Appearance: He is well-developed. HENT:      Head: Normocephalic. Right Ear: External ear normal.      Left Ear: External ear normal.      Nose: Nose normal.   Eyes:      Pupils: Pupils are equal, round, and reactive to light. Neck:      Musculoskeletal: Normal range of motion. Cardiovascular:      Rate and Rhythm: Normal rate and regular rhythm. Heart sounds: Murmur present. Comments: 3/6 to 4/6 systolic ejection murmur best heard in the mitral area. Second murmur over the aortic outflow tract at 2/6 systolic ejection murmur. Pulmonary:      Breath sounds: Normal breath sounds. Abdominal:      General: Bowel sounds are normal.      Palpations: Abdomen is soft. There is no mass. Tenderness: There is no abdominal tenderness. There is no guarding or rebound. Musculoskeletal:      Comments: Some kyphoscoliosis of the thoracic spine. Stiffness of hip and knee with flexion and extension. Skin:     General: Skin is warm and dry. Neurological:      Mental Status: He is alert and oriented to person, place, and time. EKG shows changes consistent with left ventricular hypertrophy. There appears to be no acute ST-T wave changes that are consistent. He does have inverted T wave in aVL but otherwise appears to be relatively normal.  I will asked him to see cardiology. I will get lab work today including troponin as his last episode was about a week ago. I will let him know the results of this testing.

## 2020-08-13 ENCOUNTER — HOSPITAL ENCOUNTER (OUTPATIENT)
Age: 85
Discharge: HOME | End: 2020-08-15
Payer: MEDICARE

## 2020-08-13 LAB
ALBUMIN SERPL-MCNC: 3.9 G/DL (ref 3.5–5.2)
ALP BLD-CCNC: 87 U/L (ref 40–129)
ALT SERPL-CCNC: 20 U/L (ref 0–40)
ANION GAP SERPL CALCULATED.3IONS-SCNC: 11 MMOL/L (ref 7–16)
AST SERPL-CCNC: 23 U/L (ref 0–39)
BASOPHILS ABSOLUTE: 0.02 E9/L (ref 0–0.2)
BASOPHILS RELATIVE PERCENT: 0.4 % (ref 0–2)
BILIRUB SERPL-MCNC: 0.4 MG/DL (ref 0–1.2)
BUN BLDV-MCNC: 21 MG/DL (ref 8–23)
CALCIUM SERPL-MCNC: 9.8 MG/DL (ref 8.6–10.2)
CHLORIDE BLD-SCNC: 103 MMOL/L (ref 98–107)
CO2: 26 MMOL/L (ref 22–29)
CREAT SERPL-MCNC: 0.8 MG/DL (ref 0.7–1.2)
EOSINOPHILS ABSOLUTE: 0.1 E9/L (ref 0.05–0.5)
EOSINOPHILS RELATIVE PERCENT: 2.2 % (ref 0–6)
FERRITIN: 218 NG/ML
GFR AFRICAN AMERICAN: >60
GFR NON-AFRICAN AMERICAN: >60 ML/MIN/1.73
GLUCOSE BLD-MCNC: 79 MG/DL (ref 74–99)
HCT VFR BLD CALC: 40.7 % (ref 37–54)
HEMOGLOBIN: 12.6 G/DL (ref 12.5–16.5)
IMMATURE GRANULOCYTES #: 0 E9/L
IMMATURE GRANULOCYTES %: 0 % (ref 0–5)
IRON SATURATION: 27 % (ref 20–55)
IRON: 74 MCG/DL (ref 59–158)
LYMPHOCYTES ABSOLUTE: 1.57 E9/L (ref 1.5–4)
LYMPHOCYTES RELATIVE PERCENT: 34.5 % (ref 20–42)
MCH RBC QN AUTO: 28.6 PG (ref 26–35)
MCHC RBC AUTO-ENTMCNC: 31 % (ref 32–34.5)
MCV RBC AUTO: 92.5 FL (ref 80–99.9)
MONOCYTES ABSOLUTE: 0.45 E9/L (ref 0.1–0.95)
MONOCYTES RELATIVE PERCENT: 9.9 % (ref 2–12)
NEUTROPHILS ABSOLUTE: 2.41 E9/L (ref 1.8–7.3)
NEUTROPHILS RELATIVE PERCENT: 53 % (ref 43–80)
PDW BLD-RTO: 15.7 FL (ref 11.5–15)
PLATELET # BLD: 255 E9/L (ref 130–450)
PMV BLD AUTO: 9.7 FL (ref 7–12)
POTASSIUM SERPL-SCNC: 4.5 MMOL/L (ref 3.5–5)
RBC # BLD: 4.4 E12/L (ref 3.8–5.8)
SODIUM BLD-SCNC: 140 MMOL/L (ref 132–146)
TOTAL IRON BINDING CAPACITY: 276 MCG/DL (ref 250–450)
TOTAL PROTEIN: 6.5 G/DL (ref 6.4–8.3)
TROPONIN: <0.01 NG/ML (ref 0–0.03)
TSH SERPL DL<=0.05 MIU/L-ACNC: 2.44 UIU/ML (ref 0.27–4.2)
WBC # BLD: 4.6 E9/L (ref 4.5–11.5)

## 2020-08-13 PROCEDURE — 80053 COMPREHEN METABOLIC PANEL: CPT

## 2020-08-13 PROCEDURE — 84443 ASSAY THYROID STIM HORMONE: CPT

## 2020-08-13 PROCEDURE — 83550 IRON BINDING TEST: CPT

## 2020-08-13 PROCEDURE — 83540 ASSAY OF IRON: CPT

## 2020-08-13 PROCEDURE — 85025 COMPLETE CBC W/AUTO DIFF WBC: CPT

## 2020-08-13 PROCEDURE — 36415 COLL VENOUS BLD VENIPUNCTURE: CPT

## 2020-08-13 PROCEDURE — 84484 ASSAY OF TROPONIN QUANT: CPT

## 2020-08-13 PROCEDURE — 82728 ASSAY OF FERRITIN: CPT

## 2020-08-17 ENCOUNTER — TELEPHONE (OUTPATIENT)
Dept: FAMILY MEDICINE CLINIC | Age: 85
End: 2020-08-17

## 2020-08-17 NOTE — TELEPHONE ENCOUNTER
Patient should not be on any nonsteroidal for multiple reasons.   He is on Eliquis and he has severe persistent diarrhea which would be worsened by nonsteroidal.  I would not fill the Celebrex

## 2020-08-17 NOTE — TELEPHONE ENCOUNTER
saumya calling dr Eunice Rich ordered celebrex and you have patient on eliquis. There is an interaction of a bleeding risk. Ok to dispense together?  Dr Eunice Rich differed to you

## 2020-08-20 ENCOUNTER — OFFICE VISIT (OUTPATIENT)
Dept: CARDIOLOGY CLINIC | Age: 85
End: 2020-08-20
Payer: MEDICARE

## 2020-08-20 VITALS
WEIGHT: 134 LBS | HEART RATE: 93 BPM | RESPIRATION RATE: 18 BRPM | TEMPERATURE: 97.4 F | SYSTOLIC BLOOD PRESSURE: 98 MMHG | BODY MASS INDEX: 26.31 KG/M2 | HEIGHT: 60 IN | DIASTOLIC BLOOD PRESSURE: 60 MMHG

## 2020-08-20 PROCEDURE — 93000 ELECTROCARDIOGRAM COMPLETE: CPT | Performed by: INTERNAL MEDICINE

## 2020-08-20 PROCEDURE — 99203 OFFICE O/P NEW LOW 30 MIN: CPT | Performed by: INTERNAL MEDICINE

## 2020-08-20 RX ORDER — LANOLIN ALCOHOL/MO/W.PET/CERES
1000 CREAM (GRAM) TOPICAL DAILY
COMMUNITY

## 2020-08-20 RX ORDER — VIT C/B6/B5/MAGNESIUM/HERB 173 50-5-6-5MG
CAPSULE ORAL DAILY
COMMUNITY

## 2020-08-20 RX ORDER — CHLORAL HYDRATE 500 MG
1000 CAPSULE ORAL DAILY
COMMUNITY

## 2020-10-01 ENCOUNTER — TELEPHONE (OUTPATIENT)
Dept: CARDIOLOGY | Age: 85
End: 2020-10-01

## 2020-10-02 NOTE — TELEPHONE ENCOUNTER
Name of Medication(s) Requested:  [facility]    Pharmacy Requested:   [facility]    Medication(s) pended? [x] Yes  [] No    Last Appointment:  8/10/2020    Future appts:  Future Appointments   Date Time Provider Axel Violet   2/10/2021  9:30 AM Kane Bourgeois  [address]        Does patient need call back?   [] Yes  [x] No

## 2020-10-26 ENCOUNTER — TELEPHONE (OUTPATIENT)
Dept: CARDIOLOGY | Age: 85
End: 2020-10-26

## 2020-10-26 NOTE — TELEPHONE ENCOUNTER
Spoke with patient's wife , reminded of echo appointment on 10-28 @ 8:00 am with stress to follow. Reviewed Covid Instructions, wife will drop  off for appointments.

## 2020-10-27 RX ORDER — LISINOPRIL 5 MG/1
5 TABLET ORAL DAILY
Qty: 90 TABLET | Refills: 1 | Status: SHIPPED
Start: 2020-10-27 | End: 2021-02-10 | Stop reason: SDUPTHER

## 2020-10-27 NOTE — TELEPHONE ENCOUNTER
Last Appointment:  8/10/2020  Future Appointments   Date Time Provider Axel Violet   10/28/2020  8:00 AM [facility] ECHO RM 1 CHARLES LOPEZ [facility] Mona   10/28/2020  9:30 AM [facility] STRESS ROOM 1 CHARLES [facility] Berna Wolfe   2/10/2021  9:30 AM MD BAYLEE [facility]     Refill requested.

## 2020-10-28 ENCOUNTER — HOSPITAL ENCOUNTER (OUTPATIENT)
Dept: CARDIOLOGY | Age: 85
Discharge: HOME | End: 2020-10-28
Payer: MEDICARE

## 2020-10-28 VITALS
SYSTOLIC BLOOD PRESSURE: 130 MMHG | TEMPERATURE: 97.5 F | WEIGHT: 130 LBS | HEIGHT: 69 IN | HEART RATE: 66 BPM | RESPIRATION RATE: 20 BRPM | BODY MASS INDEX: 19.26 KG/M2 | DIASTOLIC BLOOD PRESSURE: 72 MMHG

## 2020-10-28 LAB
LV EF: 63 %
LVEF MODALITY: NORMAL

## 2020-10-28 PROCEDURE — 93017 CV STRESS TEST TRACING ONLY: CPT

## 2020-10-28 PROCEDURE — 3430000000 HC RX DIAGNOSTIC RADIOPHARMACEUTICAL: Performed by: INTERNAL MEDICINE

## 2020-10-28 PROCEDURE — 78452 HT MUSCLE IMAGE SPECT MULT: CPT

## 2020-10-28 PROCEDURE — A9500 TC99M SESTAMIBI: HCPCS | Performed by: INTERNAL MEDICINE

## 2020-10-28 PROCEDURE — 6360000002 HC RX W HCPCS: Performed by: INTERNAL MEDICINE

## 2020-10-28 PROCEDURE — 93306 TTE W/DOPPLER COMPLETE: CPT | Performed by: PSYCHIATRY & NEUROLOGY

## 2020-10-28 PROCEDURE — 2580000003 HC RX 258: Performed by: INTERNAL MEDICINE

## 2020-10-28 RX ORDER — SODIUM CHLORIDE 0.9 % (FLUSH) 0.9 %
10 SYRINGE (ML) INJECTION PRN
Status: DISCONTINUED | OUTPATIENT
Start: 2020-10-28 | End: 2020-10-29 | Stop reason: HOSPADM

## 2020-10-28 RX ADMIN — SODIUM CHLORIDE, PRESERVATIVE FREE 10 ML: 5 INJECTION INTRAVENOUS at 10:50

## 2020-10-28 RX ADMIN — Medication 33.2 MILLICURIE: at 10:49

## 2020-10-28 RX ADMIN — SODIUM CHLORIDE, PRESERVATIVE FREE 10 ML: 5 INJECTION INTRAVENOUS at 09:27

## 2020-10-28 RX ADMIN — REGADENOSON 0.4 MG: 0.08 INJECTION, SOLUTION INTRAVENOUS at 10:49

## 2020-10-28 RX ADMIN — Medication 10.5 MILLICURIE: at 09:27

## 2020-10-28 RX ADMIN — SODIUM CHLORIDE, PRESERVATIVE FREE 10 ML: 5 INJECTION INTRAVENOUS at 10:49

## 2020-10-28 NOTE — PROCEDURES
[address] [facility] [address] [address]  [phone]                Pharmacologic Stress Nuclear Gated SPECT Study    Name: Quintin Katz Account Number: [de-identified]    :  1928          Sex: male         Date of Study:  10/28/2020    Height: 5' 9\" (175.3 cm)         Weight: 130 lb (59 kg)     Ordering Provider: Michele Steinberg MD          PCP: Linda Melgoza MD      Cardiologist: Michele Steinberg MD             Interpreting Physician: Michele Steinberg MD  _________________________________________________________________________________    Indication:   Detecting the presence and location of coronary artery disease    Clinical History:   Patient has no known history of coronary artery disease. Resting ECG:    IL int 0.18 sec, QRS int 0.08 sec, QT int sec; HR 66 bpm  Normal sinus rhythm. Possible old septal MI. Procedure:   Pharmacologic stress testing was performed with regadenoson 0.4 mg for 15 seconds. The heart rate was 66 at baseline and jose martin to 100 beats during the infusion. This corresponds to 78% of maximum predicted heart rate. The blood pressure at baseline was 130/70 and blood pressure at the end of infusion was 138/76. Blood pressure response was normal during the stress procedure. The patient tolerated the infusion well. ECG during the infusion did not change. IMAGING: Myocardial perfusion imaging was performed at rest 30-35 minutes following the intravenous injection of 10.5 mCi of (Tc-Sestamibi) followed by 10 ml of Normal Saline. As per infusion protocol, the patient was injected intravenously with 33.2 mCi of (Tc-Sestamibi) followed by 10 ml of Normal Saline. Gated post-stress tomographic imaging was performed 20-25 minutes after stress. FINDINGS: The overall quality of the study was excellent.      Left ventricular cavity size was noted to be normal.    Rotational analog analysis demonstrated no patient motion or abnormal extracardiac radioactivity. The gated SPECT stress imaging in the short, vertical long, and horizontal long axis demonstrated moderate intensity moderately large defect of the apex in a symmetric fashion. The resting images show no change. Gated SPECT left ventricular ejection fraction was calculated to be 72%, with normal myocardial thickening and wall motion. Impression:    1. ECG during the infusion did not change. 2. The myocardial perfusion imaging was abnormal and consistent with attenuation artifact. No reversible defect  3. Overall left ventricular systolic function was normal without regional wall motion abnormalities. 4. Low risk  pharmacologic stress test.    Thank you for sending your patient to this [employer].      Electronically signed by David Lawrence MD on 10/28/20 at 4:35 PM EDT

## 2020-10-30 ENCOUNTER — TELEPHONE (OUTPATIENT)
Dept: CARDIOLOGY CLINIC | Age: 85
End: 2020-10-30

## 2020-10-30 NOTE — TELEPHONE ENCOUNTER
Contacted patient's wife with results and recommendations per Dr. Sid Brizuela. She verbalized understanding. Letter forwarded to Dr. Queen Rivera.    ----- Message from Rafaela Kent MD sent at 10/30/2020  1:12 PM EDT -----    Tell the patient his stress test looks good meaning his heart muscle gets enough blood and that the echo shows that the murmur and valve narrowing are not severe. We are not currently therefore recommending treatment or further imaging. We would be pleased to see him back again if Dr. Queen Rivera wants.

## 2020-12-21 ENCOUNTER — HOSPITAL ENCOUNTER (OUTPATIENT)
Age: 85
Discharge: HOME | End: 2020-12-21
Payer: MEDICARE

## 2020-12-21 LAB — PROSTATE SPECIFIC ANTIGEN: 3.78 NG/ML (ref 0–4)

## 2020-12-21 PROCEDURE — 84153 ASSAY OF PSA TOTAL: CPT

## 2020-12-21 PROCEDURE — 36415 COLL VENOUS BLD VENIPUNCTURE: CPT

## 2021-01-12 DIAGNOSIS — H61.23 CERUMEN DEBRIS ON TYMPANIC MEMBRANE OF BOTH EARS: Primary | ICD-10-CM

## 2021-02-02 DIAGNOSIS — E03.9 ACQUIRED HYPOTHYROIDISM: ICD-10-CM

## 2021-02-02 RX ORDER — LEVOTHYROXINE SODIUM 0.1 MG/1
TABLET ORAL
Qty: 90 TABLET | Refills: 1 | Status: SHIPPED
Start: 2021-02-02 | End: 2021-07-29

## 2021-02-02 NOTE — TELEPHONE ENCOUNTER
Last Appointment:  8/10/2020  Future Appointments   Date Time Provider Axel Lazo   2/10/2021  9:30 AM Shay Cadena  [address] [place] [address]   3/8/2021  9:30 AM Austin Paz ENT [facility]

## 2021-02-10 ENCOUNTER — OFFICE VISIT (OUTPATIENT)
Dept: FAMILY MEDICINE CLINIC | Age: 86
End: 2021-02-10
Payer: MEDICARE

## 2021-02-10 VITALS
BODY MASS INDEX: 19.49 KG/M2 | SYSTOLIC BLOOD PRESSURE: 126 MMHG | TEMPERATURE: 98.1 F | WEIGHT: 132 LBS | DIASTOLIC BLOOD PRESSURE: 80 MMHG | HEART RATE: 75 BPM | RESPIRATION RATE: 16 BRPM | OXYGEN SATURATION: 99 %

## 2021-02-10 DIAGNOSIS — E03.9 ACQUIRED HYPOTHYROIDISM: Primary | ICD-10-CM

## 2021-02-10 DIAGNOSIS — C61 PROSTATE CANCER (HCC): ICD-10-CM

## 2021-02-10 DIAGNOSIS — Z79.01 CHRONIC ANTICOAGULATION: ICD-10-CM

## 2021-02-10 DIAGNOSIS — K59.1 FUNCTIONAL DIARRHEA: ICD-10-CM

## 2021-02-10 DIAGNOSIS — E03.9 ACQUIRED HYPOTHYROIDISM: ICD-10-CM

## 2021-02-10 DIAGNOSIS — I10 ESSENTIAL HYPERTENSION: Chronic | ICD-10-CM

## 2021-02-10 DIAGNOSIS — M19.90 ARTHRITIS: ICD-10-CM

## 2021-02-10 LAB
ALBUMIN SERPL-MCNC: 3.9 G/DL (ref 3.5–5.2)
ALP BLD-CCNC: 90 U/L (ref 40–129)
ALT SERPL-CCNC: 19 U/L (ref 0–40)
ANION GAP SERPL CALCULATED.3IONS-SCNC: 9 MMOL/L (ref 7–16)
AST SERPL-CCNC: 25 U/L (ref 0–39)
BASOPHILS ABSOLUTE: 0.03 E9/L (ref 0–0.2)
BASOPHILS RELATIVE PERCENT: 0.7 % (ref 0–2)
BILIRUB SERPL-MCNC: 0.4 MG/DL (ref 0–1.2)
BUN BLDV-MCNC: 21 MG/DL (ref 8–23)
CALCIUM SERPL-MCNC: 9.5 MG/DL (ref 8.6–10.2)
CHLORIDE BLD-SCNC: 108 MMOL/L (ref 98–107)
CHOLESTEROL, TOTAL: 184 MG/DL (ref 0–199)
CO2: 26 MMOL/L (ref 22–29)
CREAT SERPL-MCNC: 0.7 MG/DL (ref 0.7–1.2)
EOSINOPHILS ABSOLUTE: 0.05 E9/L (ref 0.05–0.5)
EOSINOPHILS RELATIVE PERCENT: 1.1 % (ref 0–6)
GFR AFRICAN AMERICAN: >60
GFR NON-AFRICAN AMERICAN: >60 ML/MIN/1.73
GLUCOSE BLD-MCNC: 77 MG/DL (ref 74–99)
HCT VFR BLD CALC: 43.5 % (ref 37–54)
HDLC SERPL-MCNC: 70 MG/DL
HEMOGLOBIN: 13.2 G/DL (ref 12.5–16.5)
IMMATURE GRANULOCYTES #: 0.02 E9/L
IMMATURE GRANULOCYTES %: 0.4 % (ref 0–5)
LDL CHOLESTEROL CALCULATED: 99 MG/DL (ref 0–99)
LYMPHOCYTES ABSOLUTE: 1.31 E9/L (ref 1.5–4)
LYMPHOCYTES RELATIVE PERCENT: 29.4 % (ref 20–42)
MCH RBC QN AUTO: 28.8 PG (ref 26–35)
MCHC RBC AUTO-ENTMCNC: 30.3 % (ref 32–34.5)
MCV RBC AUTO: 94.8 FL (ref 80–99.9)
MONOCYTES ABSOLUTE: 0.55 E9/L (ref 0.1–0.95)
MONOCYTES RELATIVE PERCENT: 12.4 % (ref 2–12)
NEUTROPHILS ABSOLUTE: 2.49 E9/L (ref 1.8–7.3)
NEUTROPHILS RELATIVE PERCENT: 56 % (ref 43–80)
PDW BLD-RTO: 15.3 FL (ref 11.5–15)
PLATELET # BLD: 276 E9/L (ref 130–450)
PMV BLD AUTO: 9.7 FL (ref 7–12)
POTASSIUM SERPL-SCNC: 4.6 MMOL/L (ref 3.5–5)
RBC # BLD: 4.59 E12/L (ref 3.8–5.8)
SODIUM BLD-SCNC: 143 MMOL/L (ref 132–146)
TOTAL PROTEIN: 6.7 G/DL (ref 6.4–8.3)
TRIGL SERPL-MCNC: 73 MG/DL (ref 0–149)
VLDLC SERPL CALC-MCNC: 15 MG/DL
WBC # BLD: 4.5 E9/L (ref 4.5–11.5)

## 2021-02-10 PROCEDURE — 99214 OFFICE O/P EST MOD 30 MIN: CPT | Performed by: INTERNAL MEDICINE

## 2021-02-10 RX ORDER — LISINOPRIL 5 MG/1
5 TABLET ORAL DAILY
Qty: 90 TABLET | Refills: 1 | Status: SHIPPED
Start: 2021-02-10 | End: 2021-04-28

## 2021-02-10 ASSESSMENT — ENCOUNTER SYMPTOMS
DIARRHEA: 0
RESPIRATORY NEGATIVE: 1
ALLERGIC/IMMUNOLOGIC NEGATIVE: 1

## 2021-02-10 ASSESSMENT — PATIENT HEALTH QUESTIONNAIRE - PHQ9
SUM OF ALL RESPONSES TO PHQ QUESTIONS 1-9: 0
SUM OF ALL RESPONSES TO PHQ9 QUESTIONS 1 & 2: 0

## 2021-02-10 NOTE — PROGRESS NOTES
2/10/2021    Percy Alexandre (:  1928) is a 80 y.o. male, here for evaluation of the following medical concerns:    Is followed in the instructions regarding his diarrhea and this has now had total resolution. Recently followed by Dr. Celeste Chatterjee and his PSA is increasing. He is on Lupron. Every 4 to 6 months he does see orthopedic surgery regarding his knees and does get steroid injections. Patient denies cardiac or respiratory symptoms currently and he did have a recent stress test which was negative. Diarrhea           Review of Systems   Constitutional: Negative for activity change. HENT: Negative. Eyes:        His macular degeneration. [facility] and through the [facility]. Respiratory: Negative. Cardiovascular:        Patient has valvular heart disease by auscultation and by echo done in 2016. He is not symptomatic. Gastrointestinal: Negative for diarrhea. Diarrhea has now resolved. Endocrine: Negative. Genitourinary:        History of prostate cancer. Currently not symptomatic. Followed by Dr. Edgardo Mike. Recent elevation of PSA. Receiving Lupron therapy   Musculoskeletal:        Chronic bilateral knee pain. Corticosteroid injections every 4 to 6 months   Allergic/Immunologic: Negative. Neurological: Negative. Hematological:        History of previous anemia. Had been placed on iron therapy back in 2016. Psychiatric/Behavioral: Negative. Prior to Visit Medications    Medication Sig Taking?  Authorizing Provider   levothyroxine (SYNTHROID) 100 MCG tablet TAKE 1 TABLET BY MOUTH EVERY DAY Yes Ke Carney MD   apixaban (ELIQUIS) 2.5 MG TABS tablet Take 1 tablet by mouth 2 times daily Yes Ke Carney MD   lisinopril (PRINIVIL;ZESTRIL) 5 MG tablet TAKE 1 TABLET BY MOUTH DAILY  Ke Carney MD   vitamin B-12 (CYANOCOBALAMIN) 1000 MCG tablet Take 1,000 mcg by mouth daily  Historical Provider, MD   Multiple Vitamins-Minerals (PRESERVISION AREDS PO) Take by mouth daily  Historical Provider, MD   Turmeric 500 MG CAPS Take by mouth daily  Historical Provider, MD   Omega-3 Fatty Acids (FISH OIL) 1000 MG CAPS Take 1,000 mg by mouth daily  Historical Provider, MD   diclofenac sodium (VOLTAREN) 1 % GEL Apply 2 g topically 4 times daily  Patient not taking: Reported on 8/20/2020  Marisol Carrillo MD   Handicap Placard MISC by Does not apply route PERMANENT  Historical Provider, MD   calcium carbonate (OSCAL) 500 MG TABS tablet Take 500 mg by mouth daily  Historical Provider, MD   B Complex-C (SUPER B COMPLEX PO) Take by mouth  Historical Provider, MD   magnesium gluconate (MAGONATE) 500 MG tablet Take 500 mg by mouth 2 times daily  Historical Provider, MD   leuprolide (LUPRON) 45 MG injection Inject 45 mg into the muscle  Historical Provider, MD   acetaminophen (TYLENOL) 325 MG tablet Take 650 mg by mouth every 6 hours as needed for Pain  Historical Provider, MD        Allergies   Allergen Reactions    Eggs Or Egg-Derived Products     Eggs [Egg White] Other (See Comments)     Violently ill for 1 week to 10 days.  No flu shots    Sulfa Antibiotics Other (See Comments)     Weakness, violently ill    Sulfa Antibiotics        Past Medical History:   Diagnosis Date    History of prostate cancer 1970    radiation for 1 month and then seed implants     History of spinal fracture     [place] (hard of hearing)     Hypertension     Hypothyroidism 2004    Leg cramps     Prostate cancer ([address]     Pulmonary emboli ([address]        Past Surgical History:   Procedure Laterality Date    COLONOSCOPY  2014    [place]    HAND TENDON SURGERY      HERNIA REPAIR  age 15   [facility] [address]  6/7/12    RIGHT , WITH MESH    KNEE ARTHROSCOPY      MASTOID SURGERY      OTHER SURGICAL HISTORY  approx 8 yrs ago    prostate radiation and implanted seeds    OTHER SURGICAL HISTORY Right 04/30/2017    head laceration        Social History Socioeconomic History    Marital status:      Spouse name: Not on file    Number of children: Not on file    Years of education: Not on file    Highest education level: Not on file   Occupational History    Occupation: business owner   Social Needs    Financial resource strain: Not on file    Food insecurity     Worry: Not on file     Inability: Not on file   [employer] needs     Medical: Not on file     Non-medical: Not on file   Tobacco Use    Smoking status: Never Smoker    Smokeless tobacco: Never Used   Substance and Sexual Activity    Alcohol use: Yes     Frequency: 4 or more times a week     Drinks per session: 1 or 2     Comment: socially    Drug use: No    Sexual activity: Not Currently     Partners: Female   Lifestyle    Physical activity     Days per week: 0 days     Minutes per session: Not on file    Stress: Not on file   Relationships    Social connections     Talks on phone: Not on file     Gets together: Not on file     Attends [employer] service: Not on file     Active member of club or organization: Not on file     Attends meetings of clubs or organizations: Not on file     Relationship status: Not on file    Intimate partner violence     Fear of current or ex partner: Not on file     Emotionally abused: Not on file     Physically abused: Not on file     Forced sexual activity: Not on file   Other Topics Concern    Not on file   Social History Narrative    ** Merged History Encounter **    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 He has been on the same location on [address] for many years. His wife has had a stroke and he is the primary caregiver.         Family History   Problem Relation Age of Onset    Heart Attack Daughter     Stroke Mother     Cancer Father         lung, smoking     Cancer Brother         throat, smoking        Vitals:    02/10/21 0925   BP: 126/80   Pulse: 75   Resp: 16   Temp: 98.1 °F (36.7 °C)   SpO2: 99% Weight: 132 lb (59.9 kg)     Estimated body mass index is 19.49 kg/m² as calculated from the following:    Height as of 10/28/20: 5' 9\" (1.753 m). Weight as of this encounter: 132 lb (59.9 kg). Physical Exam  Constitutional:       Appearance: He is well-developed. HENT:      Head: Normocephalic. Right Ear: External ear normal.      Left Ear: External ear normal.      Nose: Nose normal.   Eyes:      Pupils: Pupils are equal, round, and reactive to light. Neck:      Musculoskeletal: Normal range of motion. Cardiovascular:      Rate and Rhythm: Normal rate and regular rhythm. Heart sounds: Murmur present. Comments: 3/6 to 4/6 systolic ejection murmur best heard in the mitral area. Second murmur over the aortic outflow tract at 2/6 systolic ejection murmur. Pulmonary:      Breath sounds: Normal breath sounds. Abdominal:      General: Bowel sounds are normal.      Palpations: Abdomen is soft. There is no mass. Tenderness: There is no abdominal tenderness. There is no guarding or rebound. Musculoskeletal:      Comments: Some kyphoscoliosis of the thoracic spine. Stiffness of hip and knee with flexion and extension. Skin:     General: Skin is warm and dry. Neurological:      Mental Status: He is alert and oriented to person, place, and time. Cathy Schneider was seen today for 6 month follow-up. Diagnoses and all orders for this visit:    Acquired hypothyroidism  -     lisinopril (PRINIVIL;ZESTRIL) 5 MG tablet; Take 1 tablet by mouth daily  -     apixaban (ELIQUIS) 2.5 MG TABS tablet; Take 1 tablet by mouth 2 times daily  -     Comprehensive Metabolic Panel; Future  -     Lipid Panel; Future  -     CBC Auto Differential; Future    Essential hypertension  -     lisinopril (PRINIVIL;ZESTRIL) 5 MG tablet; Take 1 tablet by mouth daily  -     apixaban (ELIQUIS) 2.5 MG TABS tablet; Take 1 tablet by mouth 2 times daily  -     Comprehensive Metabolic Panel;  Future  -     Lipid Panel; Future  -     CBC Auto Differential; Future    Arthritis    Functional diarrhea    Prostate cancer (HCC)    Chronic anticoagulation  -     apixaban (ELIQUIS) 2.5 MG TABS tablet; Take 1 tablet by mouth 2 times daily    Patient is to have lab work today. Thankfully the diarrhea has resolved. Unfortunately the PSA is rising and this will need to be watched carefully. I will get the notes from Dr. Enio Lagunas. Patient is chronically anticoagulated on Eliquis. He denies any significant bleeding or bruising. I will see him back in 6 months. Patient has received one of his 2 Covid vaccinations.

## 2021-03-08 ENCOUNTER — OFFICE VISIT (OUTPATIENT)
Dept: ENT CLINIC | Age: 86
End: 2021-03-08
Payer: MEDICARE

## 2021-03-08 VITALS
BODY MASS INDEX: 18.61 KG/M2 | DIASTOLIC BLOOD PRESSURE: 68 MMHG | SYSTOLIC BLOOD PRESSURE: 110 MMHG | TEMPERATURE: 98 F | OXYGEN SATURATION: 97 % | HEART RATE: 84 BPM | HEIGHT: 70 IN | WEIGHT: 130 LBS

## 2021-03-08 DIAGNOSIS — H61.22 IMPACTED CERUMEN OF LEFT EAR: Primary | ICD-10-CM

## 2021-03-08 PROCEDURE — 69210 REMOVE IMPACTED EAR WAX UNI: CPT | Performed by: NURSE PRACTITIONER

## 2021-03-08 PROCEDURE — 99213 OFFICE O/P EST LOW 20 MIN: CPT | Performed by: NURSE PRACTITIONER

## 2021-03-08 ASSESSMENT — ENCOUNTER SYMPTOMS
RESPIRATORY NEGATIVE: 1
EYES NEGATIVE: 1
STRIDOR: 0
SINUS PRESSURE: 1
SINUS PAIN: 1
SHORTNESS OF BREATH: 0

## 2021-03-08 NOTE — PROGRESS NOTES
[facility] Otolaryngology  Dr. Tameka Barrera. BITA Stephenson Ms.Ed. New Consult       Patient Name:  Isaac Desai  :  1928     CHIEF C/O:    Chief Complaint   Patient presents with    Hearing Problem     NP present with hearing loss, possible cerumen impaction       HISTORY OBTAINED FROM:  patient    HISTORY OF PRESENT ILLNESS:       Alea Krishnamurthy is a 80y.o. year old male, here today for hears feeling plugged. He states this has been a persistent problem for several years but has worsened over the past 2 to 3 months. He does have a history of cerumen impactions in the past. He had a previous mastoid surgery as a child on the left ear with a narrowing of the ear canal. He denies any pain or drainage. He denies any dizziness. He does complain of hearing loss and wears hearing aids bilaterally. He states he does still struggle to hear with the left hearing aid in. He denies any recent fevers or recent antibiotic usage.             Past Medical History:   Diagnosis Date    History of prostate cancer 1970    radiation for 1 month and then seed implants     History of spinal fracture     [place]     Hypertension     Hypothyroidism     Leg cramps     Prostate cancer ([address]     Pulmonary emboli ([address]      Past Surgical History:   Procedure Laterality Date    COLONOSCOPY      [place]    HAND TENDON SURGERY      HERNIA REPAIR  age 15   [address]    RIGHT , WITH MESH    KNEE ARTHROSCOPY      MASTOID SURGERY      OTHER SURGICAL HISTORY  approx 8 yrs ago    prostate radiation and implanted seeds    OTHER SURGICAL HISTORY Right 2017    head laceration        Current Outpatient Medications:     lisinopril (PRINIVIL;ZESTRIL) 5 MG tablet, Take 1 tablet by mouth daily, Disp: 90 tablet, Rfl: 1    apixaban (ELIQUIS) 2.5 MG TABS tablet, Take 1 tablet by mouth 2 times daily, Disp: 60 tablet, Rfl: 5    levothyroxine (SYNTHROID) 100 MCG tablet, TAKE 1 TABLET BY MOUTH EVERY DAY, Disp: 90 tablet, Rfl: 1    vitamin B-12 (CYANOCOBALAMIN) 1000 MCG tablet, Take 1,000 mcg by mouth daily, Disp: , Rfl:     Multiple Vitamins-Minerals (PRESERVISION AREDS PO), Take by mouth daily, Disp: , Rfl:     Turmeric 500 MG CAPS, Take by mouth daily, Disp: , Rfl:     Omega-3 Fatty Acids (FISH OIL) 1000 MG CAPS, Take 1,000 mg by mouth daily, Disp: , Rfl:     Handicap Placard MISC, by Does not apply route PERMANENT, Disp: , Rfl:     calcium carbonate (OSCAL) 500 MG TABS tablet, Take 500 mg by mouth daily, Disp: , Rfl:     B Complex-C (SUPER B COMPLEX PO), Take by mouth, Disp: , Rfl:     magnesium gluconate (MAGONATE) 500 MG tablet, Take 500 mg by mouth 2 times daily, Disp: , Rfl:     leuprolide (LUPRON) 45 MG injection, Inject 45 mg into the muscle, Disp: , Rfl:     acetaminophen (TYLENOL) 325 MG tablet, Take 650 mg by mouth every 6 hours as needed for Pain, Disp: , Rfl:     diclofenac sodium (VOLTAREN) 1 % GEL, Apply 2 g topically 4 times daily (Patient not taking: Reported on 8/20/2020), Disp: 1 Tube, Rfl: 0  Eggs or egg-derived products, Eggs [egg white], Sulfa antibiotics, and Sulfa antibiotics  Social History     Tobacco Use    Smoking status: Never Smoker    Smokeless tobacco: Never Used   Substance Use Topics    Alcohol use: Yes     Frequency: 4 or more times a week     Drinks per session: 1 or 2     Comment: socially    Drug use: No     Family History   Problem Relation Age of Onset    Heart Attack Daughter     Stroke Mother     Cancer Father         lung, smoking     Cancer Brother         throat, smoking        Review of Systems   Constitutional: Negative. Negative for activity change and appetite change. HENT: Positive for hearing loss, sinus pressure and sinus pain. Negative for congestion and ear pain. Eyes: Negative. Respiratory: Negative. Negative for shortness of breath and stridor. Cardiovascular: Negative. Negative for chest pain and palpitations.    Endocrine: Negative. Genitourinary: Negative. Skin: Negative. Neurological: Negative. Negative for dizziness. Hematological: Negative. Psychiatric/Behavioral: Negative. /68 (Site: Left Upper Arm, Position: Sitting, Cuff Size: Medium Adult)   Pulse 84   Temp 98 °F (36.7 °C)   Ht 5' 10\" (1.778 m)   Wt 130 lb (59 kg)   SpO2 97%   BMI 18.65 kg/m²   Physical Exam  Constitutional:       Appearance: Normal appearance. HENT:      Head: Normocephalic. Right Ear: Tympanic membrane, ear canal and external ear normal.      Left Ear: Tympanic membrane and external ear normal. There is impacted cerumen. Ears:        Comments: Left TM normal after cerumen removed     Nose: Nose normal.      Mouth/Throat:      Lips: Pink. Mouth: Mucous membranes are moist.      Pharynx: Oropharynx is clear. Eyes:      Conjunctiva/sclera: Conjunctivae normal.      Pupils: Pupils are equal, round, and reactive to light. Neck:      Musculoskeletal: Normal range of motion. No neck rigidity or muscular tenderness. Cardiovascular:      Rate and Rhythm: Normal rate and regular rhythm. Pulses: Normal pulses. Pulmonary:      Effort: Pulmonary effort is normal. No respiratory distress. Breath sounds: No stridor. Musculoskeletal: Normal range of motion. Skin:     General: Skin is warm and dry. Neurological:      General: No focal deficit present. Mental Status: He is alert and oriented to person, place, and time. Psychiatric:         Mood and Affect: Mood normal.         Behavior: Behavior normal.         Thought Content: Thought content normal.         Judgment: Judgment normal.         IMPRESSION/PLAN:  Cerumen removal     Auditory canal(s) left ear completely obstructed with cerumen. Cerumen was gently removed using soft plastic curette, alligator forceps, gentle irrigation. Tympanic membranes are intact following the procedure.  Auditory canals appear normal.      Gabe Gabriel was seen today for hearing problem. Diagnoses and all orders for this visit:    Impacted cerumen of left ear  -     IL REMOVAL IMPACTED CERUMEN INSTRUMENTATION Luis Enrique Lay is seen today for complaint of muffled hearing in the left ear. Upon exam he is found to have a complete cerumen impaction in the left ear that was removed without difficulty. He does state improvement after the cerumen is removed. Lateral TMs are normal in appearance without sign of middle ear effusion or retraction. There is no erythema or edema of either ear canal. At this time Lawrence Hardy is offered routine cleanings at a 6-month interval but elects to call for any worsening or problems as needed. He will follow-up as needed.     Zelalem Donato, NICANOR, FNP-C  [facility], Nose and Throat    The information contained in this note has been dictated using drug and medical speech recognition software and may contain errors

## 2021-04-28 DIAGNOSIS — I10 ESSENTIAL HYPERTENSION: Chronic | ICD-10-CM

## 2021-04-28 DIAGNOSIS — E03.9 ACQUIRED HYPOTHYROIDISM: ICD-10-CM

## 2021-04-28 RX ORDER — LISINOPRIL 5 MG/1
5 TABLET ORAL DAILY
Qty: 90 TABLET | Refills: 1 | Status: SHIPPED
Start: 2021-04-28 | End: 2021-12-02

## 2021-04-28 NOTE — TELEPHONE ENCOUNTER
Last Appointment:  2/10/2021  Future Appointments   Date Time Provider Axel Lazo   8/18/2021 10:30 AM Omega Lancaster  [address] [place] [address]

## 2021-07-26 ENCOUNTER — NURSE TRIAGE (OUTPATIENT)
Dept: OTHER | Facility: CLINIC | Age: 86
End: 2021-07-26

## 2021-07-26 ENCOUNTER — TELEPHONE (OUTPATIENT)
Dept: FAMILY MEDICINE CLINIC | Age: 86
End: 2021-07-26

## 2021-07-26 NOTE — TELEPHONE ENCOUNTER
Reason for Disposition   Requesting regular office appointment    Answer Assessment - Initial Assessment Questions  1. REASON FOR CALL or QUESTION: \"What is your reason for calling today? \" or \"How can I best help you? \" or \"What question do you have that I can help answer? \"      Caregiver stated patient had a brief episode of confusion. Caller denies any signs or symptoms currently present and is not with patient. Caller stated patient is working currently. Writer instructed caller to please take pt to ER if she felt symptoms were urgent and to call 911 if she felt symptoms are life-threatening. Protocols used: INFORMATION ONLY CALL - NO TRIAGE-ADULT-AH    Received call from Rene Jamison at [facility] with Red Flag Complaint. Brief description of triage: No triage, info only. Triage indicates for patient to NO triage info only    Care advice provided, patient verbalizes understanding; denies any other questions or concerns; instructed to call back for any new or worsening symptoms. Writer provided warm transfer to [facility] at [facility] for appointment scheduling. Attention Provider: Thank you for allowing me to participate in the care of your patient. The patient was connected to triage in response to information provided to the ECC. Please do not respond through this encounter as the response is not directed to a shared pool.

## 2021-07-26 NOTE — TELEPHONE ENCOUNTER
Care giver calling on sat he experienced new confusion. He could not remember how to use his east utensils and does not remember the evening. He did not go to ED to rule out stroke. He has had no confusion since then. Care giver is asking for an appt asap vs waiting til 08/18.  Please advise

## 2021-07-29 DIAGNOSIS — E03.9 ACQUIRED HYPOTHYROIDISM: ICD-10-CM

## 2021-07-29 RX ORDER — LEVOTHYROXINE SODIUM 0.1 MG/1
TABLET ORAL
Qty: 90 TABLET | Refills: 1 | Status: SHIPPED
Start: 2021-07-29 | End: 2022-01-27

## 2021-08-07 NOTE — PROGRESS NOTES
Problem: Adult Inpatient Plan of Care  Goal: Plan of Care Review  Outcome: Ongoing, Progressing  Flowsheets (Taken 8/7/2021 1347)  Progress: improving  Plan of Care Reviewed With:   patient   significant other  Outcome Summary: VSS with HR slightly elevated, fundus firm, light vaginal bleeding, voids, ambulates and passing flatus.   Goal Outcome Evaluation:  Plan of Care Reviewed With: patient, significant other        Progress: improving  Outcome Summary: VSS with HR slightly elevated, fundus firm, light vaginal bleeding, voids, ambulates and passing flatus.   [facility]  Uma Thomason. RORO Fonseca M.D.  Tomas Almaraz. [employer] Deveron Pita, M.D. Emerson Rumpf, Rayna Blush, M.D. MD Kike Dumont   1928  Ruben Grant MD    This 42-year-old man is seen for outpatient cardiac consultation today. He has a history of hypertension, remote DVT/pulmonary emboli, carotid stenosis and aortic valve stenosis. Jeremy Escudero He tries to be physically active but seems mostly limited by very poor vision. He uses a cane. Recently he has had some vague precordial discomfort with minimal activity. It was short-lived. It does not always seem reproducible or predictable. There were no associated symptoms. He has fatigue and occasional dyspnea but no  orthopnea PND or ankle edema. He has had a history of diarrhea and has been evaluated by Conrado Tucker      Medical History:  1 Hypothyroidism  2 History of prostate cancer. Rx radiation/Lupron  3 hypertension  4 history of DVT and pulmonary emboli 2016.  6 months anticoagulation recommended  5 surgical history: Hernia repair, hand tendon surgery, inguinal hernia repair, knee arthroscopy, mastoid surgery  6 . Carotid ultrasound 2017: 50-69% proximal and distal left ICA  7 echo 2016: LVEF 55%. Peak aortic velocity 2.2 consistent with mild to moderate aortic valve stenosis. RVSP 30 Aortic valve area (continuity) 1.86 cm 2.  8 macular degeneration  9 chronic low back pain. Relieved by acupuncture  10 history melanoma  11 hearing impaired    The patient is . 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 He goes into the office every day. Non-smoke since age 28 . Family history: Father  age 68 with lung cancer. Mother  age 64 with phlebitis.   Daughter had MI at age           Review of Systems:  Constitutional: negative for fever and chills  Respiratory: negative for cough and hemoptysis  Cardiovascular:   Gastrointestinal: negative for abdominal pain, diarrhea, nausea and vomiting  Genitourinary:negative for dysuria and hematuria  Derm: negative for rash and skin lesion(s)  Neurological: negative for seizures and tremors  Endocrine: negative for diabetic symptoms including polydipsia and polyuria  Musculoskeletal: negative for CTD  Psychiatric: negative for psychosis and major depression    On examination, on exam he is an alert pleasant hearing-impaired elderly  male in no distress   Skin is warm and dry. Respirations are unlabored. There were no vitals taken for this visit. Othelia Aime HEENT negative for scleral icterus. Extraocular muscles intact. No facial asymmetry or central cyanosis. Neck without masses or goiter. No bruit or JVD. Cardiac apex not displaced. Rhythm regular. Low pitched harsh grade 2-3/6 NANCY upper left sternal border. Diastole silent. S2 diminished. Abdomen normal.  Extremities without edema. Lungs are clear lungs are clear    EKG today shows sinus rhythm 93/min. Low QRS voltage mild nonspecific lateral T abnormality. Mr. Minor Gongora has carotid disease and  aortic valve stenosis. He has symptoms suggestive of angina and he also has dyspnea. Given his advanced age and comorbidities, it is difficult to ascribe symptoms to a particular cardiac etiology. He is anxious however to do as well as possible. After a long discussion regarding the likelihood of ischemic disease and worsening valve disease. He wishes some further evaluation. Both an echo and a Travisen Plaster study will be scheduled and further recommendations made.     At completion of today's visit, medications include the following:    Current Outpatient Medications:     diclofenac sodium (VOLTAREN) 1 % GEL, Apply 2 g topically 4 times daily, Disp: 1 Tube, Rfl: 0    lisinopril (PRINIVIL;ZESTRIL) 5 MG tablet, TAKE 1 TABLET BY MOUTH DAILY, Disp: 90 tablet, Rfl: 1    ELIQUIS 2.5 MG TABS tablet, TAKE 1 TABLET BY MOUTH TWICE DAILY, Disp: 60 tablet, Rfl: 5    levothyroxine (SYNTHROID) 100 MCG tablet, Take 1 tablet by mouth Daily, Disp: 90 tablet, Rfl: 1    Handicap Placard MISC, by Does not apply route PERMANENT, Disp: , Rfl:     calcium carbonate (OSCAL) 500 MG TABS tablet, Take 500 mg by mouth daily, Disp: , Rfl:     B Complex-C (SUPER B COMPLEX PO), Take by mouth, Disp: , Rfl:     magnesium gluconate (MAGONATE) 500 MG tablet, Take 500 mg by mouth 2 times daily, Disp: , Rfl:     leuprolide (LUPRON) 45 MG injection, Inject 45 mg into the muscle, Disp: , Rfl:     acetaminophen (TYLENOL) 325 MG tablet, Take 650 mg by mouth every 6 hours as needed for Pain, Disp: , Rfl:       Note: This report was completed utilizing computer voice recognition software. Every effort has been made to ensure accuracy, however; inadvertent computerized transcription errors may be present.     --Michael Murray MD on 8/20/2020 at 7:04 AM

## 2021-10-05 ENCOUNTER — OFFICE VISIT (OUTPATIENT)
Dept: PRIMARY CARE CLINIC | Age: 86
End: 2021-10-05
Payer: MEDICARE

## 2021-10-05 VITALS
HEART RATE: 69 BPM | RESPIRATION RATE: 14 BRPM | DIASTOLIC BLOOD PRESSURE: 60 MMHG | TEMPERATURE: 97.3 F | OXYGEN SATURATION: 97 % | SYSTOLIC BLOOD PRESSURE: 102 MMHG | WEIGHT: 127 LBS | HEIGHT: 70 IN | BODY MASS INDEX: 18.18 KG/M2

## 2021-10-05 DIAGNOSIS — E03.9 ACQUIRED HYPOTHYROIDISM: ICD-10-CM

## 2021-10-05 DIAGNOSIS — Z79.01 CHRONIC ANTICOAGULATION: Primary | ICD-10-CM

## 2021-10-05 DIAGNOSIS — I10 PRIMARY HYPERTENSION: ICD-10-CM

## 2021-10-05 PROBLEM — I65.29 CAROTID ARTERY STENOSIS: Status: ACTIVE | Noted: 2017-05-02

## 2021-10-05 PROCEDURE — 99213 OFFICE O/P EST LOW 20 MIN: CPT | Performed by: NURSE PRACTITIONER

## 2021-10-05 PROCEDURE — 93000 ELECTROCARDIOGRAM COMPLETE: CPT | Performed by: NURSE PRACTITIONER

## 2021-10-05 ASSESSMENT — ENCOUNTER SYMPTOMS
ALLERGIC/IMMUNOLOGIC NEGATIVE: 1
RESPIRATORY NEGATIVE: 1
DIARRHEA: 1

## 2021-10-05 NOTE — LETTER
[address]  Phone: [phone]  Fax: [phone]    RAPHAEL Myers CNP        October 7, 2021     Patient: Herrera Smith   YOB: 1928   Date of Visit: 10/5/2021       To Whom It May Concern: It is my medical opinion that [place] Earnest is medically optimized for proposed surgery. If you have any questions or concerns, please don't hesitate to call.     Sincerely,        RAPHAEL Myers CNP / Jaylan Thomas MD

## 2021-10-05 NOTE — PROGRESS NOTES
10/5/2021    Barrie Liu (:  1928) is a 80 y.o. male, here for evaluation of the following medical concerns: This is a patient who is here for preoperative physical.  He is to have kyphoplasty completed on Friday of this week, 10/8/2021. The patient is anticoagulated on Eliquis, it appears he has a history of paroxysmal atrial fibrillation. Dr. Paul Bridges and I discussed the case today while the patient was here, he has already been instructed on discontinuing the Eliquis. The patient does follow with cardiology and had a stress test completed just under 1 year ago which was negative. He denies any cardiac or respiratory symptomatology, although his functional capacity is decreased as would be expected for his age. Allergies as listed above. Diarrhea           Review of Systems   Constitutional: Negative for activity change. HENT: Negative. Eyes:        His macular degeneration. [facility]. Respiratory: Negative. Cardiovascular:        Patient has valvular heart disease by auscultation and by echo done in 2016. He is not symptomatic. Gastrointestinal: Positive for diarrhea. Diarrhea is intermittent but explosive to the point of near syncope or syncope. Recent evaluation in the emergency room. Endocrine: Negative. Genitourinary:        History of prostate cancer. Currently not symptomatic. Followed by Dr. Jersey Nichole. Musculoskeletal:        Low back pain which is relieved by acupuncture. Has generalized arthritis. Allergic/Immunologic: Negative. Neurological: Negative. Hematological:        History of previous anemia. Had been placed on iron therapy back in 2016. Psychiatric/Behavioral: Negative. Prior to Visit Medications    Medication Sig Taking?  Authorizing Provider   levothyroxine (SYNTHROID) 100 MCG tablet TAKE 1 TABLET BY MOUTH EVERY DAY Yes Sinan Jesus MD lisinopril (PRINIVIL;ZESTRIL) 5 MG tablet TAKE 1 TABLET BY MOUTH DAILY Yes Jorge Luciano MD   apixaban (ELIQUIS) 2.5 MG TABS tablet Take 1 tablet by mouth 2 times daily Yes Jorge Luciano MD   vitamin B-12 (CYANOCOBALAMIN) 1000 MCG tablet Take 1,000 mcg by mouth daily Yes Historical Provider, MD   Multiple Vitamins-Minerals (PRESERVISION AREDS PO) Take by mouth daily Yes Historical Provider, MD   Turmeric 500 MG CAPS Take by mouth daily Yes Historical Provider, MD   Omega-3 Fatty Acids (FISH OIL) 1000 MG CAPS Take 1,000 mg by mouth daily Yes Historical Provider, MD   diclofenac sodium (VOLTAREN) 1 % GEL Apply 2 g topically 4 times daily Yes Jorge Luciano MD   Handicap Placard MISC by Does not apply route PERMANENT Yes Historical Provider, MD   calcium carbonate (OSCAL) 500 MG TABS tablet Take 500 mg by mouth daily Yes Historical Provider, MD   B Complex-C (SUPER B COMPLEX PO) Take by mouth Yes Historical Provider, MD   magnesium gluconate (MAGONATE) 500 MG tablet Take 500 mg by mouth 2 times daily Yes Historical Provider, MD   leuprolide (LUPRON) 45 MG injection Inject 45 mg into the muscle Yes Historical Provider, MD   acetaminophen (TYLENOL) 325 MG tablet Take 650 mg by mouth every 6 hours as needed for Pain Yes Historical Provider, MD        Allergies   Allergen Reactions    Eggs Or Egg-Derived Products     Eggs [Egg White] Other (See Comments)     Violently ill for 1 week to 10 days.  No flu shots    Sulfa Antibiotics Other (See Comments)     Weakness, violently ill    Sulfa Antibiotics        Past Medical History:   Diagnosis Date    History of prostate cancer 1970    radiation for 1 month and then seed implants     History of spinal fracture     [place] (hard of hearing)     Hypertension     Hypothyroidism 2004    Leg cramps     Prostate cancer ([address]     Pulmonary emboli ([address]        Past Surgical History:   Procedure Laterality Date    [place]OSCOPY  2014    [place]    HAND TENDON SURGERY      HERNIA REPAIR  age 16    INGUINAL HERNIA REPAIR  6/7/12    RIGHT , WITH MESH    KNEE ARTHROSCOPY      MASTOID SURGERY      OTHER SURGICAL HISTORY  approx 8 yrs ago    prostate radiation and implanted seeds    OTHER SURGICAL HISTORY Right 04/30/2017    head laceration        Social History     Socioeconomic History    Marital status:      Spouse name: Not on file    Number of children: Not on file    Years of education: Not on file    Highest education level: Not on file   Occupational History    Occupation: business owner   Tobacco Use    Smoking status: Never Smoker    Smokeless tobacco: Never Used   Vaping Use    Vaping Use: Never used   Substance and Sexual Activity    Alcohol use: Yes     Comment: socially    Drug use: No    Sexual activity: Not Currently     Partners: Female   Other Topics Concern    Not on file   Social History Narrative    ** Merged History Encounter **    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 He has been on the same location on [address] for many years. His wife has had a stroke and he is the primary caregiver. Social Determinants of Health     Financial Resource Strain:     Difficulty of Paying Living Expenses:    Food Insecurity:     Worried About Running Out of Food in the Last Year:     [address] in the Last Year:    Transportation Needs:     Lack of Transportation (Medical):      Lack of Transportation (Non-Medical):    Physical Activity:     Days of Exercise per Week:     Minutes of Exercise per Session:    Stress:     Feeling of Stress :    Social Connections:     Frequency of Communication with Friends and Family:     Frequency of Social Gatherings with Friends and Family:     Attends Catholic Services:     Active Member of Clubs or Organizations:     Attends Club or Organization Meetings:     Marital Status:    Intimate Partner Violence:     Fear of Current or Ex-Partner:     Emotionally Abused:     Physically Abused:     Sexually Abused:         Family History   Problem Relation Age of Onset    Heart Attack Daughter     Stroke Mother     Cancer Father         lung, smoking     Cancer Brother         throat, smoking        Vitals:    10/05/21 1317   BP: 102/60   Pulse: 69   Resp: 14   Temp: 97.3 °F (36.3 °C)   SpO2: 97%   Weight: 127 lb (57.6 kg)   Height: 5' 10\" (1.778 m)     Estimated body mass index is 18.22 kg/m² as calculated from the following:    Height as of this encounter: 5' 10\" (1.778 m). Weight as of this encounter: 127 lb (57.6 kg). Physical Exam  Constitutional:       Appearance: He is well-developed. HENT:      Head: Normocephalic. Right Ear: External ear normal.      Left Ear: External ear normal.      Nose: Nose normal.   Eyes:      Pupils: Pupils are equal, round, and reactive to light. Cardiovascular:      Rate and Rhythm: Normal rate and regular rhythm. Heart sounds: Murmur heard. Comments: 3/6 to 4/6 systolic ejection murmur best heard in the mitral area. Second murmur over the aortic outflow tract at 2/6 systolic ejection murmur. Pulmonary:      Breath sounds: Normal breath sounds. Abdominal:      General: Bowel sounds are normal.      Palpations: Abdomen is soft. There is no mass. Tenderness: There is no abdominal tenderness. There is no guarding or rebound. Musculoskeletal:      Cervical back: Normal range of motion. Comments: Some kyphoscoliosis of the thoracic spine. Stiffness of hip and knee with flexion and extension. Skin:     General: Skin is warm and dry. Neurological:      Mental Status: He is alert and oriented to person, place, and time. Ayaan Gunderson was seen today for pre-op exam.    Diagnoses and all orders for this visit:    Chronic anticoagulation    Primary hypertension  -     CBC Auto Differential; Future  -     Comprehensive Metabolic Panel;  Future  -     EKG 12 Lead    Acquired hypothyroidism  -     TSH without Reflex; Future  -     EKG 12 Lead      Lab work will be checked today, as this has not been done since earlier this year. EKG will also be obtained and this will be compared to previous. Recommendations will be made pending these results.

## 2021-10-11 ENCOUNTER — TELEPHONE (OUTPATIENT)
Dept: FAMILY MEDICINE CLINIC | Age: 86
End: 2021-10-11

## 2021-10-11 NOTE — TELEPHONE ENCOUNTER
Patients wife calling to let you know that the surgery he had scheduled for his back this week has been moved to 10/21/2021 due to UTI. He went to [facility] urgent care where they did send out a urine culture and put him on cephalexin 500mg TID. Called [facility] and gave them fax number for the office so results can be faxed to you. Wife wanted you to be notified.  Number for [facility] is [phone]

## 2021-10-13 NOTE — TELEPHONE ENCOUNTER
This is the note from the orthopedic spine surgeon. We need the note from well now. We need to make sure that the urinary tract infection is covered on this antibiotic he was placed on.

## 2021-10-13 NOTE — TELEPHONE ENCOUNTER
Called [facility]. He was seen on the 10th and the results are still pending. They will send over the office notes now.

## 2021-10-18 ENCOUNTER — OFFICE VISIT (OUTPATIENT)
Dept: ENT CLINIC | Age: 86
End: 2021-10-18
Payer: MEDICARE

## 2021-10-18 VITALS
SYSTOLIC BLOOD PRESSURE: 106 MMHG | BODY MASS INDEX: 19.26 KG/M2 | HEART RATE: 85 BPM | WEIGHT: 130 LBS | OXYGEN SATURATION: 97 % | DIASTOLIC BLOOD PRESSURE: 68 MMHG | HEIGHT: 69 IN | TEMPERATURE: 97.4 F

## 2021-10-18 DIAGNOSIS — H61.23 EXCESSIVE CERUMEN IN BOTH EAR CANALS: Primary | ICD-10-CM

## 2021-10-18 PROCEDURE — 69210 REMOVE IMPACTED EAR WAX UNI: CPT | Performed by: NURSE PRACTITIONER

## 2021-10-18 NOTE — TELEPHONE ENCOUNTER
As we had discussed, this is not a clean-catch urine. There is no evidence of a urinary tract infection based on this urine culture.

## 2021-10-18 NOTE — PROGRESS NOTES
Subjective:      Patient ID:  Jocelyn Marroquin is a 80 y.o. male. HPI:    Pt presents with a history of cerumen impaction removal.   The patients ear was last cleaned 6 month(s) ago. The patient was not using ear drops to loosen wax immediately prior to this visit. Hearing aids: yes      Past Medical History:   Diagnosis Date    History of prostate cancer 1970    radiation for 1 month and then seed implants     History of spinal fracture     [place] (hard of hearing)     Hypertension     Hypothyroidism 2004    Leg cramps     Prostate cancer ([address]     Pulmonary emboli ([address]      Past Surgical History:   Procedure Laterality Date    [place]ONOSCOPY  2014    [place]    HAND TENDON SURGERY      HERNIA REPAIR  age 15   [place] [id number]  6/7/12    RIGHT , WITH MESH    KNEE ARTHROSCOPY      MASTOID SURGERY      OTHER SURGICAL HISTORY  approx 8 yrs ago    prostate radiation and implanted seeds    OTHER SURGICAL HISTORY Right 04/30/2017    head laceration      Family History   Problem Relation Age of Onset    Heart Attack Daughter     Stroke Mother     Cancer Father         lung, smoking     Cancer Brother         throat, smoking      Social History     Socioeconomic History    Marital status:      Spouse name: None    Number of children: None    Years of education: None    Highest education level: None   Occupational History    Occupation: business owner   Tobacco Use    Smoking status: Never Smoker    Smokeless tobacco: Never Used   Vaping Use    Vaping Use: Never used   Substance and Sexual Activity    Alcohol use: Yes     Comment: socially    Drug use: No    Sexual activity: Not Currently     Partners: Female   Other Topics Concern    None   Social History Narrative    ** Merged History Encounter **    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 He has been on the same location on [address] for many years.   His wife has had a stroke and he is the primary caregiver. Social Determinants of Health     Financial Resource Strain:     Difficulty of Paying Living Expenses:    Food Insecurity:     Worried About Running Out of Food in the Last Year:     [address] in the Last Year:    Transportation Needs:     Lack of Transportation (Medical):  Lack of Transportation (Non-Medical):    Physical Activity:     Days of Exercise per Week:     Minutes of Exercise per Session:    Stress:     Feeling of Stress :    Social Connections:     Frequency of Communication with Friends and Family:     Frequency of Social Gatherings with Friends and Family:     Attends Rastafari Services:     Active Member of Clubs or Organizations:     Attends Club or Organization Meetings:     Marital Status:    Intimate Partner Violence:     Fear of Current or Ex-Partner:     Emotionally Abused:     Physically Abused:     Sexually Abused: Allergies   Allergen Reactions    Eggs Or Egg-Derived Products     Eggs [Egg White] Other (See Comments)     Violently ill for 1 week to 10 days. No flu shots    Sulfa Antibiotics Other (See Comments)     Weakness, violently ill    Sulfa Antibiotics        Review of Systems   HENT: Positive for hearing loss. Objective:     Vitals:    10/18/21 1013   BP: 106/68   Pulse: 85   Temp: 97.4 °F (36.3 °C)   SpO2: 97%     Physical Exam  HENT:      Right Ear: Tympanic membrane, ear canal and external ear normal. There is impacted cerumen. Left Ear: Tympanic membrane, ear canal and external ear normal. There is impacted cerumen. Ears:      Comments: Bilateral TMs normal after cerumen removed. Cerumen removal     Auditory canal(s) both ears partially obstructed with cerumen. A microscope was used . Cerumen was gently removed using soft plastic curette. Tympanic membranes are intact following the procedure. Auditory canals appear normal.            Assessment:       Diagnosis Orders   1.  Excessive cerumen in both ear canals  AR REMOVAL IMPACTED CERUMEN INSTRUMENTATION UNILAT              Plan:      Ears good, TMS normal, tolerated cleaning well.     Follow up in 6 month(s)

## 2021-10-21 ENCOUNTER — HOSPITAL ENCOUNTER (OUTPATIENT)
Age: 86
Discharge: HOME | End: 2021-10-23

## 2021-10-21 PROCEDURE — 88307 TISSUE EXAM BY PATHOLOGIST: CPT

## 2021-10-21 PROCEDURE — 88311 DECALCIFY TISSUE: CPT

## 2021-10-28 DIAGNOSIS — Z79.01 CHRONIC ANTICOAGULATION: ICD-10-CM

## 2021-10-28 DIAGNOSIS — I10 ESSENTIAL HYPERTENSION: Chronic | ICD-10-CM

## 2021-10-28 DIAGNOSIS — E03.9 ACQUIRED HYPOTHYROIDISM: ICD-10-CM

## 2021-10-28 RX ORDER — APIXABAN 2.5 MG/1
TABLET, FILM COATED ORAL
Qty: 60 TABLET | Refills: 5 | Status: SHIPPED
Start: 2021-10-28 | End: 2022-06-27

## 2021-11-10 ENCOUNTER — OFFICE VISIT (OUTPATIENT)
Dept: FAMILY MEDICINE CLINIC | Age: 86
End: 2021-11-10
Payer: MEDICARE

## 2021-11-10 VITALS
BODY MASS INDEX: 19.33 KG/M2 | WEIGHT: 135 LBS | OXYGEN SATURATION: 96 % | DIASTOLIC BLOOD PRESSURE: 70 MMHG | RESPIRATION RATE: 20 BRPM | TEMPERATURE: 97.3 F | HEART RATE: 92 BPM | HEIGHT: 70 IN | SYSTOLIC BLOOD PRESSURE: 118 MMHG

## 2021-11-10 DIAGNOSIS — J06.9 ACUTE UPPER RESPIRATORY INFECTION, UNSPECIFIED: Primary | ICD-10-CM

## 2021-11-10 DIAGNOSIS — R05.9 COUGH: ICD-10-CM

## 2021-11-10 DIAGNOSIS — R09.81 NASAL CONGESTION: ICD-10-CM

## 2021-11-10 DIAGNOSIS — M54.50 ACUTE BILATERAL LOW BACK PAIN WITHOUT SCIATICA: ICD-10-CM

## 2021-11-10 DIAGNOSIS — Z79.01 CHRONIC ANTICOAGULATION: ICD-10-CM

## 2021-11-10 DIAGNOSIS — J01.90 ACUTE NON-RECURRENT SINUSITIS, UNSPECIFIED LOCATION: ICD-10-CM

## 2021-11-10 LAB
Lab: NORMAL
PERFORMING INSTRUMENT: NORMAL
QC PASS/FAIL: NORMAL
SARS-COV-2, POC: NORMAL

## 2021-11-10 PROCEDURE — 96372 THER/PROPH/DIAG INJ SC/IM: CPT | Performed by: PHYSICIAN ASSISTANT

## 2021-11-10 PROCEDURE — 3006F CXR DOC REV: CPT | Performed by: PHYSICIAN ASSISTANT

## 2021-11-10 PROCEDURE — 99214 OFFICE O/P EST MOD 30 MIN: CPT | Performed by: PHYSICIAN ASSISTANT

## 2021-11-10 PROCEDURE — 87426 SARSCOV CORONAVIRUS AG IA: CPT | Performed by: PHYSICIAN ASSISTANT

## 2021-11-10 RX ORDER — AZITHROMYCIN 250 MG/1
250 TABLET, FILM COATED ORAL SEE ADMIN INSTRUCTIONS
Qty: 6 TABLET | Refills: 0 | Status: SHIPPED | OUTPATIENT
Start: 2021-11-10 | End: 2021-11-15

## 2021-11-10 RX ORDER — METHYLPREDNISOLONE ACETATE 40 MG/ML
40 INJECTION, SUSPENSION INTRA-ARTICULAR; INTRALESIONAL; INTRAMUSCULAR; SOFT TISSUE ONCE
Status: COMPLETED | OUTPATIENT
Start: 2021-11-10 | End: 2021-11-10

## 2021-11-10 RX ORDER — METHYLPREDNISOLONE 4 MG/1
TABLET ORAL
Qty: 1 KIT | Refills: 0 | Status: SHIPPED
Start: 2021-11-10 | End: 2021-12-02

## 2021-11-10 RX ORDER — BENZONATATE 100 MG/1
100 CAPSULE ORAL 3 TIMES DAILY PRN
Qty: 21 CAPSULE | Refills: 0 | Status: SHIPPED | OUTPATIENT
Start: 2021-11-10 | End: 2021-11-17

## 2021-11-10 RX ADMIN — METHYLPREDNISOLONE ACETATE 40 MG: 40 INJECTION, SUSPENSION INTRA-ARTICULAR; INTRALESIONAL; INTRAMUSCULAR; SOFT TISSUE at 18:21

## 2021-11-10 NOTE — PROGRESS NOTES
11/10/21  Mona Robles : 1928 Sex: male  Age 80 y.o. Subjective:  Chief Complaint   Patient presents with    Cough    Back Pain         HPI:   Mona Robles , 80 y.o. male presents to [facility] care for evaluation of cough, congestion, back pain    HPI  80year-old male presents to [facility] today for evaluation of cough, congestion, back pain. The patient has 2 separate complaints. The patient just recently had kyphoplasty performed by neurosurgery at [facility]. The patient had done well with the surgery a couple of months ago. The patient then was going to his chiropractor and fell in the parking lot. He followed up with the neurosurgeon and they performed x-rays and felt that everything was well aligned and there was no evidence of acute fracture. This was on Monday. The patient has been increasing amount of pain and discomfort since that time. The patient is not any bladder or bowel incontinence. He is having a hard time ambulating. The patient is not having any other injuries or complaints at this time. However he has noted a cough and congestion that has been ongoing for last couple of days. Seems to be worse when he lays down. The patient is not having any other complaints currently. ROS:   Unless otherwise stated in this report the patient's positive and negative responses for review of systems for constitutional, eyes, ENT, cardiovascular, respiratory, gastrointestinal, neurological, , musculoskeletal, and integument systems and related systems to the presenting problem are either stated in the history of present illness or were not pertinent or were negative for the symptoms and/or complaints related to the presenting medical problem. Positives and pertinent negatives as per HPI. All others reviewed and are negative.       PMH:     Past Medical History:   Diagnosis Date    History of prostate cancer 1970    radiation for 1 month and then seed implants  History of spinal fracture     [place] (hard of hearing)     Hypertension     Hypothyroidism 2004    Leg cramps     Prostate cancer ([address]     Pulmonary emboli (HCC)        Past Surgical History:   Procedure Laterality Date    [place]ONOSCOPY  2014    [place]    HAND TENDON SURGERY      HERNIA REPAIR  age 15   [address]  6/7/12    RIGHT , WITH MESH    KNEE ARTHROSCOPY      MASTOID SURGERY      OTHER SURGICAL HISTORY  approx 8 yrs ago    prostate radiation and implanted seeds    OTHER SURGICAL HISTORY Right 04/30/2017    head laceration        Family History   Problem Relation Age of Onset    Heart Attack Daughter     Stroke Mother     Cancer Father         lung, smoking     Cancer Brother         throat, smoking        Medications:     Current Outpatient Medications:     methylPREDNISolone (MEDROL DOSEPACK) 4 MG tablet, Take by mouth., Disp: 1 kit, Rfl: 0    azithromycin (ZITHROMAX) 250 MG tablet, Take 1 tablet by mouth See Admin Instructions for 5 days 500mg on day 1 followed by 250mg on days 2 - 5, Disp: 6 tablet, Rfl: 0    benzonatate (TESSALON) 100 MG capsule, Take 1 capsule by mouth 3 times daily as needed for Cough, Disp: 21 capsule, Rfl: 0    ELIQUIS 2.5 MG TABS tablet, TAKE 1 TABLET BY MOUTH TWICE DAILY, Disp: 60 tablet, Rfl: 5    levothyroxine (SYNTHROID) 100 MCG tablet, TAKE 1 TABLET BY MOUTH EVERY DAY, Disp: 90 tablet, Rfl: 1    lisinopril (PRINIVIL;ZESTRIL) 5 MG tablet, TAKE 1 TABLET BY MOUTH DAILY, Disp: 90 tablet, Rfl: 1    vitamin B-12 (CYANOCOBALAMIN) 1000 MCG tablet, Take 1,000 mcg by mouth daily, Disp: , Rfl:     Multiple Vitamins-Minerals (PRESERVISION AREDS PO), Take by mouth daily, Disp: , Rfl:     Turmeric 500 MG CAPS, Take by mouth daily, Disp: , Rfl:     Omega-3 Fatty Acids (FISH OIL) 1000 MG CAPS, Take 1,000 mg by mouth daily, Disp: , Rfl:     diclofenac sodium (VOLTAREN) 1 % GEL, Apply 2 g topically 4 times daily, Disp: 1 Tube, Rfl: 0    Handicap [facility], by Does not apply route PERMANENT, Disp: , Rfl:     calcium carbonate (OSCAL) 500 MG TABS tablet, Take 500 mg by mouth daily, Disp: , Rfl:     B Complex-C (SUPER B COMPLEX PO), Take by mouth, Disp: , Rfl:     magnesium gluconate (MAGONATE) 500 MG tablet, Take 500 mg by mouth 2 times daily, Disp: , Rfl:     leuprolide (LUPRON) 45 MG injection, Inject 45 mg into the muscle, Disp: , Rfl:     acetaminophen (TYLENOL) 325 MG tablet, Take 650 mg by mouth every 6 hours as needed for Pain, Disp: , Rfl:     Allergies: Allergies   Allergen Reactions    Eggs Or Egg-Derived Products     Eggs [Egg White] Other (See Comments)     Violently ill for 1 week to 10 days. No flu shots    Sulfa Antibiotics Other (See Comments)     Weakness, violently ill    Sulfa Antibiotics        Social History:     Social History     Tobacco Use    Smoking status: Never Smoker    Smokeless tobacco: Never Used   Vaping Use    Vaping Use: Never used   Substance Use Topics    Alcohol use: Yes     Comment: socially    Drug use: No       Patient lives at home. Physical Exam:     Vitals:    11/10/21 1707   BP: 118/70   Site: Right Upper Arm   Position: Sitting   Cuff Size: Medium Adult   Pulse: 92   Resp: 20   Temp: 97.3 °F (36.3 °C)   TempSrc: Temporal   SpO2: 96%   Weight: 135 lb (61.2 kg)   Height: 5' 10\" (1.778 m)       Exam:  Physical Exam  Nurses note and vital signs reviewed and patient is not hypoxic. General: The patient appears well and in no apparent distress. Patient is resting comfortably on cart. Skin: Warm, dry, no pallor noted. There is no rash noted. Head: Normocephalic, atraumatic.   Eye: Normal conjunctiva  Ears, Nose, Mouth, and Throat: Minimal nasal congestion, rhinorrhea, clear, no significant blood  Cardiovascular: Regular Rate and Rhythm  Respiratory: Patient is in no distress, no accessory muscle use, lungs are diminished bilaterally but no evidence of wheezing or rhonchi, TMs clear bilaterally, posterior oropharynx did show some postnasal drip  Back: Diffuse tenderness noted throughout the lower lumbar area without specific midline tenderness, no CVA tenderness  GI: Normal bowel sounds, no tenderness to palpation, no masses appreciated. No rebound, guarding, or rigidity noted. Musculoskeletal: The patient has no evidence of calf tenderness, no pitting edema, symmetrical pulses noted bilaterally  Neurological: A&O x4, normal speech      Testing:           Medical Decision Making:     Vital signs reviewed    Past medical history reviewed. Allergies reviewed. Medications reviewed. Patient on arrival does not appear to be in any apparent distress or discomfort. The patient has been seen and evaluated. The patient does not appear to be toxic or lethargic. We discussed differential diagnosis. We will send the patient for a chest x-ray. We did obtain a Covid swab. Covid was negative. We will send off a PCR test.    The patient had x-rays and reviewed by neurosurgery. There was no evidence of acute process. Unfortunately do not have access to these images. The patient will be treated with IM injection of methylprednisone which should help with some of the congestion and cough but also help the back. We will provide the patient with a azithromycin, Medrol Dosepak, and Tessalon Perles. The patient is to return to express care or go directly to the emergency department should any of the signs or symptoms worsen. The patient is to followup with primary care physician in 2-3 days for repeat evaluation. The patient has no other questions or concerns at this time the patient will be discharged home. Clinical Impression:   Chantal Macedo was seen today for cough and back pain. Diagnoses and all orders for this visit:    Acute upper respiratory infection, unspecified    Cough  -     Cancel: XR CHEST STANDARD (2 VW); Future  -     XR CHEST STANDARD (2 VW);  Future  -     POCT COVID-19, Antigen  -     COVID-19 Ambulatory; Future  -     azithromycin (ZITHROMAX) 250 MG tablet; Take 1 tablet by mouth See Admin Instructions for 5 days 500mg on day 1 followed by 250mg on days 2 - 5    Acute non-recurrent sinusitis, unspecified location    Nasal congestion    Chronic anticoagulation    Acute bilateral low back pain without sciatica    Other orders  -     methylPREDNISolone acetate (DEPO-MEDROL) injection 40 mg  -     methylPREDNISolone (MEDROL DOSEPACK) 4 MG tablet; Take by mouth. -     benzonatate (TESSALON) 100 MG capsule; Take 1 capsule by mouth 3 times daily as needed for Cough        The patient is to call for any concerns or return if any of the signs or symptoms worsen. The patient is to follow-up with PCP in the next 2-3 days for repeat evaluation repeat assessment or go directly to the emergency department.      SIGNATURE: Paul Sanchez III, PA-C

## 2021-11-11 DIAGNOSIS — R05.9 COUGH: ICD-10-CM

## 2021-11-12 ENCOUNTER — APPOINTMENT (OUTPATIENT)
Dept: GENERAL RADIOLOGY | Age: 86
End: 2021-11-12
Payer: MEDICARE

## 2021-11-12 ENCOUNTER — HOSPITAL ENCOUNTER (EMERGENCY)
Age: 86
Discharge: HOME | End: 2021-11-12
Attending: EMERGENCY MEDICINE
Payer: MEDICARE

## 2021-11-12 VITALS
SYSTOLIC BLOOD PRESSURE: 118 MMHG | BODY MASS INDEX: 19.7 KG/M2 | OXYGEN SATURATION: 96 % | DIASTOLIC BLOOD PRESSURE: 65 MMHG | HEIGHT: 68 IN | RESPIRATION RATE: 16 BRPM | TEMPERATURE: 97.8 F | HEART RATE: 87 BPM | WEIGHT: 130 LBS

## 2021-11-12 DIAGNOSIS — M54.50 ACUTE LOW BACK PAIN WITHOUT SCIATICA, UNSPECIFIED BACK PAIN LATERALITY: Primary | ICD-10-CM

## 2021-11-12 DIAGNOSIS — S12.290D: ICD-10-CM

## 2021-11-12 LAB
SARS-COV-2: NOT DETECTED
SOURCE: NORMAL

## 2021-11-12 PROCEDURE — 6360000002 HC RX W HCPCS: Performed by: EMERGENCY MEDICINE

## 2021-11-12 PROCEDURE — 72110 X-RAY EXAM L-2 SPINE 4/>VWS: CPT

## 2021-11-12 PROCEDURE — 72170 X-RAY EXAM OF PELVIS: CPT

## 2021-11-12 PROCEDURE — 96374 THER/PROPH/DIAG INJ IV PUSH: CPT

## 2021-11-12 PROCEDURE — 99284 EMERGENCY DEPT VISIT MOD MDM: CPT

## 2021-11-12 RX ORDER — FENTANYL CITRATE 50 UG/ML
25 INJECTION, SOLUTION INTRAMUSCULAR; INTRAVENOUS ONCE
Status: COMPLETED | OUTPATIENT
Start: 2021-11-12 | End: 2021-11-12

## 2021-11-12 RX ORDER — LIDOCAINE 50 MG/G
1 PATCH TOPICAL DAILY
Qty: 10 PATCH | Refills: 0 | Status: SHIPPED | OUTPATIENT
Start: 2021-11-12 | End: 2021-11-22

## 2021-11-12 RX ORDER — HYDROCODONE BITARTRATE AND ACETAMINOPHEN 5; 325 MG/1; MG/1
1 TABLET ORAL EVERY 6 HOURS PRN
Qty: 12 TABLET | Refills: 0 | Status: SHIPPED | OUTPATIENT
Start: 2021-11-12 | End: 2021-11-15

## 2021-11-12 RX ADMIN — FENTANYL CITRATE 25 MCG: 0.05 INJECTION, SOLUTION INTRAMUSCULAR; INTRAVENOUS at 12:21

## 2021-11-12 ASSESSMENT — PAIN DESCRIPTION - LOCATION: LOCATION: BACK

## 2021-11-12 ASSESSMENT — PAIN DESCRIPTION - PAIN TYPE: TYPE: ACUTE PAIN

## 2021-11-12 ASSESSMENT — PAIN SCALES - GENERAL
PAINLEVEL_OUTOF10: 7
PAINLEVEL_OUTOF10: 9

## 2021-11-12 NOTE — ED PROVIDER NOTES
HPI:  11/12/21,   Time: 2:18 PM GARO Delgadillo is a 80 y.o. male presenting to the ED for Lower back pain , beginning several days ago. The complaint has been persistent, moderate in severity, and worsened by movement of [place], standing, walking. Patient states has had surgery of his spine October 21 had a kyphoplasty at [facility]. October 28 he fell in the parking lot going to see his chiropractor complaint of right shoulder pain which is chronic. And bruises left hip. He has no hip pain. He is complaining of lower back pain at the belt line. It is worse with standing and ambulating. He has no bowel bladder incontinence no urinary retention. He denies any urinary complaints. He has no saddle anesthesia no numbness tingling of his legs no distal neuro deficits    ROS:   Pertinent positives and negatives are stated within HPI, all other systems reviewed and are negative.  --------------------------------------------- PAST HISTORY ---------------------------------------------  Past Medical History:  has a past medical history of History of prostate cancer, History of spinal fracture, [place] (hard of hearing), Hypertension, Hypothyroidism, Leg cramps, Prostate cancer ([facility] [address] and Pulmonary emboli ([facility] [address] Past Surgical History:  has a past surgical history that includes other surgical history (approx 8 yrs ago); Knee arthroscopy; Hand tendon surgery; Inguinal hernia repair (6/7/12); Colonoscopy (2014); Mastoid surgery; other surgical history (Right, 04/30/2017); and hernia repair (age 15). Social History:  reports that he has never smoked. He has never used smokeless tobacco. He reports current alcohol use. He reports that he does not use drugs. Family History: family history includes Cancer in his brother and father; Heart Attack in his daughter; Stroke in his mother. The patients home medications have been reviewed.     Allergies: Eggs or egg-derived products, Eggs [egg white], Sulfa antibiotics, and Sulfa antibiotics    -------------------------------------------------- RESULTS -------------------------------------------------  All laboratory and radiology results have been personally reviewed by myself   LABS:  No results found for this visit on 11/12/21. RADIOLOGY:  Interpreted by Radiologist.  XR PELVIS (1-2 VIEWS)   Final Result   No acute osseous abnormality is identified. XR LUMBAR SPINE (MIN 4 VIEWS)   Final Result   L3 compression deformity with bone cement.             ------------------------- NURSING NOTES AND VITALS REVIEWED ---------------------------   The nursing notes within the ED encounter and vital signs as below have been reviewed. /65   Pulse 87   Temp 97.8 °F (36.6 °C)   Resp 16   Ht 5' 8\" (1.727 m)   Wt 130 lb (59 kg)   SpO2 96%   BMI 19.77 kg/m²   Oxygen Saturation Interpretation: Normal      ---------------------------------------------------PHYSICAL EXAM--------------------------------------      Constitutional/General: Alert and oriented x3, well appearing, non toxic in NAD  Head: NC/AT  Eyes: PERRL, EOMI, conjunctiva is pink  Mouth: Oropharynx clear, handling secretions, no trismus  Neck: Supple, full ROM, no meningeal signs  Pulmonary: Lungs clear to auscultation bilaterally, no wheezes, rales, or rhonchi. Not in respiratory distress  Cardiovascular:  Regular rate and rhythm, no murmurs, gallops, or rubs. 2+ distal pulses,  Back: Patient has no midline thoracic or lumbar tenderness. He has no tenderness over the sacrum. No paraspinal tenderness  Abdomen: Soft, non tender, non distended, no rebound tenderness guarding no CVA tenderness  Extremities: Moves all extremities x 4. Warm and well perfused  Skin: warm and dry without rash or abnormal bruising. Skin color is pink. Neurologic: GCS 15, no focal deficits, 5 out of 5 motor strength upper extremities bilaterally.   Psych: Normal Affect      ------------------------------ ED COURSE/MEDICAL DECISION MAKING----------------------  Medications   fentaNYL (SUBLIMAZE) injection 25 mcg (25 mcg IntraVENous Given 11/12/21 1221)         Medical Decision Making:    Patient sent for x-rays of his pelvis and lumbar spine they are unremarkable for any acute injury. He does have L3 compression deformity with cement from his recent kyphoplasty. He was given Sublimaze for pain control he is resting comfortably. Vital signs are stable he is afebrile. Patient be discharged back to home to follow-up with his PCP and his spinal surgeon. He will be given a prescription for Norco for pain control as needed every 8 hours. Counseling: The emergency provider has spoken with the patient and discussed todays results, in addition to providing specific details for the plan of care and counseling regarding the diagnosis and prognosis. Questions are answered at this time and they are agreeable with the plan.      --------------------------------- IMPRESSION AND DISPOSITION ---------------------------------    IMPRESSION  1. Acute low back pain without sciatica, unspecified back pain laterality    2.  Compression fracture of C3 vertebra with routine healing, subsequent encounter        DISPOSITION  Disposition: Discharge to home  Patient condition is stable                Natalia Hines DO  11/12/21 6243

## 2021-11-12 NOTE — ED NOTES
Bed: R3  Expected date:   Expected time:   Means of arrival:   Comments:  Hola Pope RN  11/12/21 1125

## 2021-11-15 ENCOUNTER — TELEPHONE (OUTPATIENT)
Dept: FAMILY MEDICINE CLINIC | Age: 86
End: 2021-11-15

## 2021-11-15 NOTE — TELEPHONE ENCOUNTER
Have him call the back surgeon who did his kyphoplasty. He may benefit from a brace or some other medication.

## 2021-11-15 NOTE — TELEPHONE ENCOUNTER
Wife calling in [address] is in an extreme pain. He had back sx recently. He cannot handle the pain. He spent the day at [facility] ED on 11/12 and they gave him norco and Lidoderm patches, he has no relief. What should they do ? No relief from otc tylenol either.

## 2021-12-01 ENCOUNTER — TELEPHONE (OUTPATIENT)
Dept: FAMILY MEDICINE CLINIC | Age: 86
End: 2021-12-01

## 2021-12-01 NOTE — TELEPHONE ENCOUNTER
I would recommend physical therapy. They can apply a TENS unit or possibly an ultrasound. I would recommend [facility] in [place] with Annette Bruno.   If they need a referral, let me know

## 2021-12-01 NOTE — TELEPHONE ENCOUNTER
Richard De Leon calling about increased back pain. He does not have any urinary symptoms, so she is pretty sure this is not the issue. He recently had back surgery and is on pain medication form the pain doctor. This is not helping the pain much. She believes it is muscle related and has taken him off the muscle relaxer because it was causing confusion and hallucinations. What is your recommendation?

## 2021-12-02 ENCOUNTER — OFFICE VISIT (OUTPATIENT)
Dept: FAMILY MEDICINE CLINIC | Age: 86
End: 2021-12-02
Payer: MEDICARE

## 2021-12-02 VITALS
HEART RATE: 92 BPM | TEMPERATURE: 97.3 F | BODY MASS INDEX: 17.48 KG/M2 | SYSTOLIC BLOOD PRESSURE: 102 MMHG | HEIGHT: 69 IN | DIASTOLIC BLOOD PRESSURE: 72 MMHG | WEIGHT: 118 LBS | RESPIRATION RATE: 18 BRPM | OXYGEN SATURATION: 96 %

## 2021-12-02 DIAGNOSIS — R41.0 CONFUSION: Primary | ICD-10-CM

## 2021-12-02 DIAGNOSIS — N39.0 URINARY TRACT INFECTION WITHOUT HEMATURIA, SITE UNSPECIFIED: ICD-10-CM

## 2021-12-02 DIAGNOSIS — M54.42 ACUTE LEFT-SIDED LOW BACK PAIN WITH LEFT-SIDED SCIATICA: ICD-10-CM

## 2021-12-02 DIAGNOSIS — R41.0 CONFUSION: ICD-10-CM

## 2021-12-02 LAB
ALBUMIN SERPL-MCNC: 4.1 G/DL (ref 3.5–5.2)
ALP BLD-CCNC: 123 U/L (ref 40–129)
ALT SERPL-CCNC: 14 U/L (ref 0–40)
AMMONIA: 14 UMOL/L (ref 16–60)
ANION GAP SERPL CALCULATED.3IONS-SCNC: 14 MMOL/L (ref 7–16)
AST SERPL-CCNC: 26 U/L (ref 0–39)
BASOPHILS ABSOLUTE: 0.02 E9/L (ref 0–0.2)
BASOPHILS RELATIVE PERCENT: 0.4 % (ref 0–2)
BILIRUB SERPL-MCNC: 0.6 MG/DL (ref 0–1.2)
BILIRUBIN, POC: NEGATIVE
BLOOD URINE, POC: NEGATIVE
BUN BLDV-MCNC: 17 MG/DL (ref 6–23)
CALCIUM SERPL-MCNC: 9.8 MG/DL (ref 8.6–10.2)
CHLORIDE BLD-SCNC: 97 MMOL/L (ref 98–107)
CLARITY, POC: NORMAL
CO2: 24 MMOL/L (ref 22–29)
COLOR, POC: NORMAL
CREAT SERPL-MCNC: 0.9 MG/DL (ref 0.7–1.2)
EOSINOPHILS ABSOLUTE: 0.04 E9/L (ref 0.05–0.5)
EOSINOPHILS RELATIVE PERCENT: 0.8 % (ref 0–6)
GFR AFRICAN AMERICAN: >60
GFR NON-AFRICAN AMERICAN: >60 ML/MIN/1.73
GLUCOSE BLD-MCNC: 107 MG/DL (ref 74–99)
GLUCOSE URINE, POC: NEGATIVE
HCT VFR BLD CALC: 42.9 % (ref 37–54)
HEMOGLOBIN: 13.8 G/DL (ref 12.5–16.5)
IMMATURE GRANULOCYTES #: 0.01 E9/L
IMMATURE GRANULOCYTES %: 0.2 % (ref 0–5)
KETONES, POC: NORMAL
LEUKOCYTE EST, POC: NORMAL
LYMPHOCYTES ABSOLUTE: 1.24 E9/L (ref 1.5–4)
LYMPHOCYTES RELATIVE PERCENT: 25.7 % (ref 20–42)
MCH RBC QN AUTO: 28.1 PG (ref 26–35)
MCHC RBC AUTO-ENTMCNC: 32.2 % (ref 32–34.5)
MCV RBC AUTO: 87.4 FL (ref 80–99.9)
MONOCYTES ABSOLUTE: 0.82 E9/L (ref 0.1–0.95)
MONOCYTES RELATIVE PERCENT: 17 % (ref 2–12)
NEUTROPHILS ABSOLUTE: 2.7 E9/L (ref 1.8–7.3)
NEUTROPHILS RELATIVE PERCENT: 55.9 % (ref 43–80)
NITRITE, POC: NEGATIVE
PDW BLD-RTO: 15.4 FL (ref 11.5–15)
PH, POC: 7
PLATELET # BLD: 227 E9/L (ref 130–450)
PMV BLD AUTO: 10 FL (ref 7–12)
POTASSIUM SERPL-SCNC: 4.6 MMOL/L (ref 3.5–5)
PROTEIN, POC: NEGATIVE
RBC # BLD: 4.91 E12/L (ref 3.8–5.8)
SODIUM BLD-SCNC: 135 MMOL/L (ref 132–146)
SPECIFIC GRAVITY, POC: 1.02
TOTAL PROTEIN: 6.9 G/DL (ref 6.4–8.3)
TSH SERPL DL<=0.05 MIU/L-ACNC: 3.26 UIU/ML (ref 0.27–4.2)
UROBILINOGEN, POC: 0.2
WBC # BLD: 4.8 E9/L (ref 4.5–11.5)

## 2021-12-02 PROCEDURE — 81002 URINALYSIS NONAUTO W/O SCOPE: CPT | Performed by: STUDENT IN AN ORGANIZED HEALTH CARE EDUCATION/TRAINING PROGRAM

## 2021-12-02 PROCEDURE — 99214 OFFICE O/P EST MOD 30 MIN: CPT | Performed by: STUDENT IN AN ORGANIZED HEALTH CARE EDUCATION/TRAINING PROGRAM

## 2021-12-02 RX ORDER — CEFDINIR 300 MG/1
300 CAPSULE ORAL 2 TIMES DAILY
Qty: 14 CAPSULE | Refills: 0 | Status: SHIPPED | OUTPATIENT
Start: 2021-12-02 | End: 2021-12-09

## 2021-12-02 ASSESSMENT — ENCOUNTER SYMPTOMS
SHORTNESS OF BREATH: 0
RHINORRHEA: 0
BACK PAIN: 1
COUGH: 0

## 2021-12-02 NOTE — TELEPHONE ENCOUNTER
I returned the call. The person I talked to stated that she was already informed of this yesterday. He is already in PT. They are bringing him to the Dr today and are not happy with the care from Dr Lopez Carlisle.

## 2021-12-02 NOTE — PROGRESS NOTES
Clayton Medina (:  1928) is a 80 y.o. male,Established patient, here for evaluation of the following chief complaint(s):  Lower Back Pain (patient states that he is having lower left back pain. ..) and Hallucinations (patients CG states that patient has been confused and hallucinating. . She believes he has  something wrong with his kidneys. Lilliam Morales it is not his back from his surgery.)         ASSESSMENT/PLAN:  1. Confusion  -     POCT Urinalysis no Micro  -     Culture, Urine; Future  -     TSH; Future  -     COMPREHENSIVE METABOLIC PANEL; Future  -     CBC WITH AUTO DIFFERENTIAL; Future  -     AMMONIA; Future  2. Urinary tract infection without hematuria, site unspecified  -     COMPREHENSIVE METABOLIC PANEL; Future  -     CBC WITH AUTO DIFFERENTIAL; Future  -     cefdinir (OMNICEF) 300 MG capsule; Take 1 capsule by mouth 2 times daily for 7 days, Disp-14 capsule, R-0Normal  3. Acute left-sided low back pain with left-sided sciatica    Patient seems to have delirium and new onset back pain. His urine was overall unremarkable (trace leukocyte esterase), but I am wondering if the flank pain and confusion is due to UTI-we will start treatment with omnicef. Culture sent  We will get labs to rule out other organic causes of delirium. He is currently not oriented to place or time. He knows he is at a doctors office but he is not completely oriented to situation either. He had a negative lumbar x ray a few weeks ago. I think finding a source of and fixing his delirium may give us a better idea of the source of his back pain. If labs are normal may consider a head CT, however we are planning to keep the muscle relaxers and opiates discontinued, he has been without these for not quite 2 days yet-and I wouldn't be surprised if we saw more improvement as time went on. OK to use tylneol and motrin for back pain for now    Somewhat unrelated, I also instructed the patient/caretaker to discontinue BP med.  His BP is 102/72. He is currently anticoagulated. He needs to have a discussion with his PCP in the near future if continued anticoagulation benefits outweigh the risks    Return if symptoms worsen or fail to improve. Subjective   SUBJECTIVE/OBJECTIVE:  Clair Naik is brought in today by his caretaker    Chief complaint is L sided back pain and altered mental status  Back surgery in October-was doing really well after it  At baseline he is completely oriented to person place and time  L side pain back pain, he denies urinary symptoms  Progressed from walking normally to in a wheel chair within a week  Everything is localized on the L side right now  He did have one fall a few weeks after his surgery, however he was seen in the ER and there were no immediate sequellae  He was given pain killers + muscle relaxers-caretaker is wondering if these were contributing to his AMS  Hallucinations started about a week ago, the muscle relaxers and pain medicine were stopped 2 days ago  Caretaker states these medications were not really helping his pain much  Has started PT  Was having some fairly bad hallucinations. Seeing green people under his table, getting his gun out (it has since been confiscated)        Complete ROS difficult to obtain, he is somewhat altered  Review of Systems   Constitutional: Negative for chills and fever. HENT: Negative for congestion and rhinorrhea. Respiratory: Negative for cough and shortness of breath. Cardiovascular: Negative for chest pain and leg swelling. Genitourinary: Positive for flank pain. Negative for dysuria and hematuria. Musculoskeletal: Positive for back pain. Negative for arthralgias and myalgias. Skin: Negative for rash and wound. Neurological: Negative for dizziness and light-headedness. Psychiatric/Behavioral: Positive for confusion and hallucinations. Objective   Physical Exam  Vitals reviewed.    Constitutional:       General: He is not in acute distress. HENT:      Head: Normocephalic and atraumatic. Eyes:      Extraocular Movements: Extraocular movements intact. Conjunctiva/sclera: Conjunctivae normal.   Cardiovascular:      Rate and Rhythm: Normal rate and regular rhythm. Pulmonary:      Effort: Pulmonary effort is normal.      Breath sounds: Normal breath sounds. No wheezing. Abdominal:      General: Abdomen is flat. There is no distension. Tenderness: There is left CVA tenderness. There is no right CVA tenderness. Musculoskeletal:         General: No deformity. Lumbar back: Tenderness present. No edema, deformity or bony tenderness. Comments: Scar noted over lumbar spine, there is some pain to palpation over the lumbar paraspinals and CVA. There is no bony tenderness   Neurological:      General: No focal deficit present. Mental Status: He is alert and oriented to person, place, and time. An electronic signature was used to authenticate this note.     --Collette Couch, MD

## 2021-12-03 ENCOUNTER — TELEPHONE (OUTPATIENT)
Dept: FAMILY MEDICINE CLINIC | Age: 86
End: 2021-12-03

## 2021-12-03 NOTE — TELEPHONE ENCOUNTER
Spoke with wife. Advised of below instructions. She said the antibiotic is causing him to have incontinent episodes of diarrhea. I advised to keep him well hydrated and take a probiotic.   Advised ER if diarrhea or pain  gets worse

## 2021-12-03 NOTE — TELEPHONE ENCOUNTER
Agree with advice. I Will keep a look out for the urine culture tonight, this weekend. If it comes back negative I will let them know they can stop it.

## 2021-12-03 NOTE — TELEPHONE ENCOUNTER
No reason for confusion or back pain noted on Bora's labs. Would recommend continuing to hold muscle relaxers and pain medicine and keep us updated. Continue antibiotics for now. We will consider more imaging if necessary or things aren't improving.     (Sorry I don't know why I didn't have the option for a result note)

## 2021-12-05 LAB — URINE CULTURE, ROUTINE: NORMAL

## 2021-12-06 ENCOUNTER — TELEPHONE (OUTPATIENT)
Dept: FAMILY MEDICINE CLINIC | Age: 86
End: 2021-12-06

## 2021-12-06 NOTE — RESULT ENCOUNTER NOTE
Urine culture resulted with no growth. OK to stop antibiotics.  Please ask for an update on symptoms (confusion and back pain)

## 2021-12-06 NOTE — TELEPHONE ENCOUNTER
Patient's care giver franko would like a return call from dr Dalton Kelley nurse; Vicky Luna. She would like to discuss his recent visit. Call back# [phone].

## 2021-12-07 RX ORDER — CALCITONIN SALMON 200 [IU]/.09ML
1 SPRAY, METERED NASAL DAILY
Qty: 3.7 ML | Refills: 1 | Status: SHIPPED | OUTPATIENT
Start: 2021-12-07

## 2021-12-07 NOTE — RESULT ENCOUNTER NOTE
Please ask for another update. What all have they been using for pain? Has diarrhea improved after stopping abx? Confusion continuing to improve?

## 2021-12-13 ENCOUNTER — HOSPITAL ENCOUNTER (INPATIENT)
Age: 86
LOS: 3 days | Discharge: HOME HEALTH | DRG: 689 | End: 2021-12-17
Attending: EMERGENCY MEDICINE | Admitting: INTERNAL MEDICINE
Payer: MEDICARE

## 2021-12-13 ENCOUNTER — APPOINTMENT (OUTPATIENT)
Dept: CT IMAGING | Age: 86
DRG: 689 | End: 2021-12-13
Payer: MEDICARE

## 2021-12-13 ENCOUNTER — APPOINTMENT (OUTPATIENT)
Dept: GENERAL RADIOLOGY | Age: 86
DRG: 689 | End: 2021-12-13
Payer: MEDICARE

## 2021-12-13 DIAGNOSIS — R41.82 ALTERED MENTAL STATUS, UNSPECIFIED ALTERED MENTAL STATUS TYPE: Primary | ICD-10-CM

## 2021-12-13 DIAGNOSIS — N30.01 ACUTE CYSTITIS WITH HEMATURIA: ICD-10-CM

## 2021-12-13 DIAGNOSIS — I95.9 HYPOTENSION, UNSPECIFIED HYPOTENSION TYPE: ICD-10-CM

## 2021-12-13 LAB
ALBUMIN SERPL-MCNC: 4 G/DL (ref 3.5–5.2)
ALP BLD-CCNC: 146 U/L (ref 40–129)
ALT SERPL-CCNC: 23 U/L (ref 0–40)
ANION GAP SERPL CALCULATED.3IONS-SCNC: 11 MMOL/L (ref 7–16)
AST SERPL-CCNC: 25 U/L (ref 0–39)
BACTERIA: ABNORMAL /HPF
BASOPHILS ABSOLUTE: 0.01 E9/L (ref 0–0.2)
BASOPHILS RELATIVE PERCENT: 0.2 % (ref 0–2)
BILIRUB SERPL-MCNC: 0.6 MG/DL (ref 0–1.2)
BILIRUBIN URINE: ABNORMAL
BLOOD, URINE: ABNORMAL
BUN BLDV-MCNC: 24 MG/DL (ref 6–23)
CALCIUM SERPL-MCNC: 10 MG/DL (ref 8.6–10.2)
CHLORIDE BLD-SCNC: 99 MMOL/L (ref 98–107)
CLARITY: ABNORMAL
CO2: 26 MMOL/L (ref 22–29)
COLOR: ABNORMAL
CREAT SERPL-MCNC: 0.8 MG/DL (ref 0.7–1.2)
EOSINOPHILS ABSOLUTE: 0.01 E9/L (ref 0.05–0.5)
EOSINOPHILS RELATIVE PERCENT: 0.2 % (ref 0–6)
EPITHELIAL CELLS, UA: ABNORMAL /HPF
GFR AFRICAN AMERICAN: >60
GFR NON-AFRICAN AMERICAN: >60 ML/MIN/1.73
GLUCOSE BLD-MCNC: 103 MG/DL (ref 74–99)
GLUCOSE URINE: 250 MG/DL
HCT VFR BLD CALC: 43.9 % (ref 37–54)
HEMOGLOBIN: 14.1 G/DL (ref 12.5–16.5)
IMMATURE GRANULOCYTES #: 0.01 E9/L
IMMATURE GRANULOCYTES %: 0.2 % (ref 0–5)
KETONES, URINE: 15 MG/DL
LACTIC ACID: 1.4 MMOL/L (ref 0.5–2.2)
LEUKOCYTE ESTERASE, URINE: ABNORMAL
LYMPHOCYTES ABSOLUTE: 1.77 E9/L (ref 1.5–4)
LYMPHOCYTES RELATIVE PERCENT: 38.7 % (ref 20–42)
MCH RBC QN AUTO: 27.8 PG (ref 26–35)
MCHC RBC AUTO-ENTMCNC: 32.1 % (ref 32–34.5)
MCV RBC AUTO: 86.6 FL (ref 80–99.9)
MONOCYTES ABSOLUTE: 0.47 E9/L (ref 0.1–0.95)
MONOCYTES RELATIVE PERCENT: 10.3 % (ref 2–12)
NEUTROPHILS ABSOLUTE: 2.3 E9/L (ref 1.8–7.3)
NEUTROPHILS RELATIVE PERCENT: 50.4 % (ref 43–80)
NITRITE, URINE: POSITIVE
PDW BLD-RTO: 14.9 FL (ref 11.5–15)
PH UA: 6.5 (ref 5–9)
PLATELET # BLD: 293 E9/L (ref 130–450)
PMV BLD AUTO: 9 FL (ref 7–12)
POTASSIUM SERPL-SCNC: 4.3 MMOL/L (ref 3.5–5)
PROTEIN UA: >=300 MG/DL
RBC # BLD: 5.07 E12/L (ref 3.8–5.8)
RBC UA: >20 /HPF (ref 0–2)
SODIUM BLD-SCNC: 136 MMOL/L (ref 132–146)
SPECIFIC GRAVITY UA: 1.02 (ref 1–1.03)
TOTAL PROTEIN: 7.3 G/DL (ref 6.4–8.3)
TROPONIN, HIGH SENSITIVITY: 25 NG/L (ref 0–11)
TROPONIN, HIGH SENSITIVITY: 27 NG/L (ref 0–11)
UROBILINOGEN, URINE: 4 E.U./DL
WBC # BLD: 4.6 E9/L (ref 4.5–11.5)
WBC UA: ABNORMAL /HPF (ref 0–5)

## 2021-12-13 PROCEDURE — 2580000003 HC RX 258: Performed by: EMERGENCY MEDICINE

## 2021-12-13 PROCEDURE — 36415 COLL VENOUS BLD VENIPUNCTURE: CPT

## 2021-12-13 PROCEDURE — 93005 ELECTROCARDIOGRAM TRACING: CPT | Performed by: PHYSICIAN ASSISTANT

## 2021-12-13 PROCEDURE — 80053 COMPREHEN METABOLIC PANEL: CPT

## 2021-12-13 PROCEDURE — 71045 X-RAY EXAM CHEST 1 VIEW: CPT

## 2021-12-13 PROCEDURE — 51701 INSERT BLADDER CATHETER: CPT

## 2021-12-13 PROCEDURE — 81001 URINALYSIS AUTO W/SCOPE: CPT

## 2021-12-13 PROCEDURE — 83605 ASSAY OF LACTIC ACID: CPT

## 2021-12-13 PROCEDURE — 84484 ASSAY OF TROPONIN QUANT: CPT

## 2021-12-13 PROCEDURE — 96361 HYDRATE IV INFUSION ADD-ON: CPT

## 2021-12-13 PROCEDURE — 85025 COMPLETE CBC W/AUTO DIFF WBC: CPT

## 2021-12-13 PROCEDURE — 99283 EMERGENCY DEPT VISIT LOW MDM: CPT

## 2021-12-13 PROCEDURE — 70450 CT HEAD/BRAIN W/O DYE: CPT

## 2021-12-13 RX ORDER — ACETAMINOPHEN 650 MG/1
650 SUPPOSITORY RECTAL EVERY 6 HOURS PRN
Status: DISCONTINUED | OUTPATIENT
Start: 2021-12-13 | End: 2021-12-17 | Stop reason: HOSPADM

## 2021-12-13 RX ORDER — ONDANSETRON 4 MG/1
4 TABLET, ORALLY DISINTEGRATING ORAL EVERY 8 HOURS PRN
Status: DISCONTINUED | OUTPATIENT
Start: 2021-12-13 | End: 2021-12-17 | Stop reason: HOSPADM

## 2021-12-13 RX ORDER — SODIUM CHLORIDE 9 MG/ML
25 INJECTION, SOLUTION INTRAVENOUS PRN
Status: DISCONTINUED | OUTPATIENT
Start: 2021-12-13 | End: 2021-12-17 | Stop reason: HOSPADM

## 2021-12-13 RX ORDER — SENNA PLUS 8.6 MG/1
1 TABLET ORAL DAILY PRN
Status: DISCONTINUED | OUTPATIENT
Start: 2021-12-13 | End: 2021-12-17 | Stop reason: HOSPADM

## 2021-12-13 RX ORDER — SODIUM CHLORIDE 9 MG/ML
INJECTION, SOLUTION INTRAVENOUS CONTINUOUS
Status: DISCONTINUED | OUTPATIENT
Start: 2021-12-13 | End: 2021-12-17 | Stop reason: HOSPADM

## 2021-12-13 RX ORDER — ONDANSETRON 2 MG/ML
4 INJECTION INTRAMUSCULAR; INTRAVENOUS EVERY 6 HOURS PRN
Status: DISCONTINUED | OUTPATIENT
Start: 2021-12-13 | End: 2021-12-17 | Stop reason: HOSPADM

## 2021-12-13 RX ORDER — 0.9 % SODIUM CHLORIDE 0.9 %
1000 INTRAVENOUS SOLUTION INTRAVENOUS ONCE
Status: COMPLETED | OUTPATIENT
Start: 2021-12-13 | End: 2021-12-14

## 2021-12-13 RX ORDER — POTASSIUM CHLORIDE 7.45 MG/ML
10 INJECTION INTRAVENOUS PRN
Status: DISCONTINUED | OUTPATIENT
Start: 2021-12-13 | End: 2021-12-17 | Stop reason: HOSPADM

## 2021-12-13 RX ORDER — SODIUM CHLORIDE 0.9 % (FLUSH) 0.9 %
10 SYRINGE (ML) INJECTION EVERY 12 HOURS SCHEDULED
Status: DISCONTINUED | OUTPATIENT
Start: 2021-12-13 | End: 2021-12-17 | Stop reason: HOSPADM

## 2021-12-13 RX ORDER — SODIUM CHLORIDE 0.9 % (FLUSH) 0.9 %
10 SYRINGE (ML) INJECTION PRN
Status: DISCONTINUED | OUTPATIENT
Start: 2021-12-13 | End: 2021-12-17 | Stop reason: HOSPADM

## 2021-12-13 RX ORDER — ACETAMINOPHEN 325 MG/1
650 TABLET ORAL EVERY 6 HOURS PRN
Status: DISCONTINUED | OUTPATIENT
Start: 2021-12-13 | End: 2021-12-17 | Stop reason: HOSPADM

## 2021-12-13 RX ORDER — POTASSIUM CHLORIDE 20 MEQ/1
40 TABLET, EXTENDED RELEASE ORAL PRN
Status: DISCONTINUED | OUTPATIENT
Start: 2021-12-13 | End: 2021-12-17 | Stop reason: HOSPADM

## 2021-12-13 RX ADMIN — SODIUM CHLORIDE 1000 ML: 9 INJECTION, SOLUTION INTRAVENOUS at 22:15

## 2021-12-13 NOTE — ED NOTES
Department of Emergency Medicine  FIRST PROVIDER TRIAGE NOTE             Independent MLP           12/13/21  12:17 PM EST    Date of Encounter: 12/13/21   MRN: [id number]      HPI: Padmini Malin is a 80 y.o. male who presents to the ED for Hematuria (with clots since last night), Altered Mental Status (recent med changes), Back Pain, and Hypotension (82/58 here)     Patient presenting with AMS, hematuria since last night accompanied by family member. ROS: Negative for cp, sob, fever, cough, vomiting or diarrhea. PE: Gen Appearance/Constitutional: alert  HEENT: NC/NT. PERRLA,  Airway patent. Initial Plan of Care: All treatment areas with department are currently occupied. Plan to order/Initiate the following while awaiting opening in ED: labs, EKG and imaging studies.     Initial Plan of Care: Initiate Treatment-Testing, Proceed toTreatment Area When Bed Available for ED Attending/MLP to Continue Care    Electronically signed by JANUSZ Moore   DD: 12/13/21       JANUSZ Moore  12/13/21 4689

## 2021-12-14 ENCOUNTER — APPOINTMENT (OUTPATIENT)
Dept: CT IMAGING | Age: 86
DRG: 689 | End: 2021-12-14
Payer: MEDICARE

## 2021-12-14 PROBLEM — R19.7 DIARRHEA DUE TO MALABSORPTION: Status: RESOLVED | Noted: 2019-12-12 | Resolved: 2021-12-14

## 2021-12-14 PROBLEM — E86.0 DEHYDRATION: Status: ACTIVE | Noted: 2021-12-14

## 2021-12-14 PROBLEM — K90.9 DIARRHEA DUE TO MALABSORPTION: Status: RESOLVED | Noted: 2019-12-12 | Resolved: 2021-12-14

## 2021-12-14 PROBLEM — N39.0 UTI (URINARY TRACT INFECTION): Status: ACTIVE | Noted: 2021-12-14

## 2021-12-14 PROBLEM — K59.1 FUNCTIONAL DIARRHEA: Status: RESOLVED | Noted: 2020-04-10 | Resolved: 2021-12-14

## 2021-12-14 PROBLEM — R63.4 WEIGHT LOSS: Status: RESOLVED | Noted: 2020-04-10 | Resolved: 2021-12-14

## 2021-12-14 PROBLEM — N34.2 INFECTIVE URETHRITIS: Status: RESOLVED | Noted: 2020-04-10 | Resolved: 2021-12-14

## 2021-12-14 PROBLEM — E06.3 AUTOIMMUNE HYPOTHYROIDISM: Chronic | Status: RESOLVED | Noted: 2017-05-02 | Resolved: 2021-12-14

## 2021-12-14 PROCEDURE — 74176 CT ABD & PELVIS W/O CONTRAST: CPT

## 2021-12-14 PROCEDURE — 97530 THERAPEUTIC ACTIVITIES: CPT

## 2021-12-14 PROCEDURE — 2580000003 HC RX 258: Performed by: EMERGENCY MEDICINE

## 2021-12-14 PROCEDURE — 96374 THER/PROPH/DIAG INJ IV PUSH: CPT

## 2021-12-14 PROCEDURE — 2580000003 HC RX 258: Performed by: INTERNAL MEDICINE

## 2021-12-14 PROCEDURE — 6370000000 HC RX 637 (ALT 250 FOR IP): Performed by: INTERNAL MEDICINE

## 2021-12-14 PROCEDURE — 6360000002 HC RX W HCPCS: Performed by: EMERGENCY MEDICINE

## 2021-12-14 PROCEDURE — G0378 HOSPITAL OBSERVATION PER HR: HCPCS

## 2021-12-14 PROCEDURE — 97165 OT EVAL LOW COMPLEX 30 MIN: CPT

## 2021-12-14 PROCEDURE — 87088 URINE BACTERIA CULTURE: CPT

## 2021-12-14 PROCEDURE — 1200000000 HC SEMI PRIVATE

## 2021-12-14 RX ORDER — TRAMADOL HYDROCHLORIDE 50 MG/1
1 TABLET ORAL 3 TIMES DAILY
COMMUNITY
Start: 2021-11-28

## 2021-12-14 RX ORDER — LISINOPRIL 5 MG/1
5 TABLET ORAL DAILY
COMMUNITY
End: 2021-12-27

## 2021-12-14 RX ORDER — CALCITONIN SALMON 200 [IU]/.09ML
1 SPRAY, METERED NASAL DAILY
Status: DISCONTINUED | OUTPATIENT
Start: 2021-12-14 | End: 2021-12-14

## 2021-12-14 RX ORDER — BICALUTAMIDE 50 MG/1
50 TABLET, FILM COATED ORAL DAILY
COMMUNITY
Start: 2021-11-19

## 2021-12-14 RX ORDER — LEVOTHYROXINE SODIUM 0.1 MG/1
100 TABLET ORAL DAILY
Status: DISCONTINUED | OUTPATIENT
Start: 2021-12-14 | End: 2021-12-17 | Stop reason: HOSPADM

## 2021-12-14 RX ORDER — CEFTRIAXONE 1 G/1
INJECTION, POWDER, FOR SOLUTION INTRAMUSCULAR; INTRAVENOUS
Status: DISPENSED
Start: 2021-12-14 | End: 2021-12-14

## 2021-12-14 RX ORDER — MAGNESIUM GLUCONATE 27 MG(500)
500 TABLET ORAL 2 TIMES DAILY
Status: DISCONTINUED | OUTPATIENT
Start: 2021-12-14 | End: 2021-12-17 | Stop reason: HOSPADM

## 2021-12-14 RX ORDER — BICALUTAMIDE 50 MG/1
50 TABLET, FILM COATED ORAL DAILY
Status: DISCONTINUED | OUTPATIENT
Start: 2021-12-14 | End: 2021-12-17 | Stop reason: HOSPADM

## 2021-12-14 RX ORDER — CALCIUM CARBONATE 500(1250)
500 TABLET ORAL DAILY
Status: DISCONTINUED | OUTPATIENT
Start: 2021-12-14 | End: 2021-12-17 | Stop reason: HOSPADM

## 2021-12-14 RX ADMIN — Medication 10 ML: at 00:13

## 2021-12-14 RX ADMIN — SODIUM CHLORIDE: 9 INJECTION, SOLUTION INTRAVENOUS at 00:04

## 2021-12-14 RX ADMIN — DICLOFENAC SODIUM 2 G: 10 GEL TOPICAL at 17:11

## 2021-12-14 RX ADMIN — BICALUTAMIDE 50 MG: 50 TABLET ORAL at 13:39

## 2021-12-14 RX ADMIN — CALCIUM 500 MG: 500 TABLET ORAL at 13:39

## 2021-12-14 RX ADMIN — Medication 10 ML: at 20:37

## 2021-12-14 RX ADMIN — CEFTRIAXONE 1000 MG: 1 INJECTION, POWDER, FOR SOLUTION INTRAMUSCULAR; INTRAVENOUS at 05:58

## 2021-12-14 RX ADMIN — DICLOFENAC SODIUM 2 G: 10 GEL TOPICAL at 20:36

## 2021-12-14 RX ADMIN — SODIUM CHLORIDE 25 ML: 9 INJECTION, SOLUTION INTRAVENOUS at 00:12

## 2021-12-14 RX ADMIN — Medication 500 MG: at 13:38

## 2021-12-14 RX ADMIN — LEVOTHYROXINE SODIUM 100 MCG: 100 TABLET ORAL at 13:38

## 2021-12-14 RX ADMIN — DICLOFENAC SODIUM 2 G: 10 GEL TOPICAL at 14:45

## 2021-12-14 RX ADMIN — APIXABAN 2.5 MG: 5 TABLET, FILM COATED ORAL at 20:40

## 2021-12-14 RX ADMIN — DICLOFENAC SODIUM 2 G: 10 GEL TOPICAL at 13:57

## 2021-12-14 RX ADMIN — APIXABAN 2.5 MG: 5 TABLET, FILM COATED ORAL at 13:38

## 2021-12-14 RX ADMIN — Medication 500 MG: at 20:40

## 2021-12-14 ASSESSMENT — PAIN DESCRIPTION - PAIN TYPE: TYPE: ACUTE PAIN

## 2021-12-14 ASSESSMENT — PAIN DESCRIPTION - ORIENTATION: ORIENTATION: RIGHT;LEFT;LOWER

## 2021-12-14 ASSESSMENT — PAIN DESCRIPTION - DESCRIPTORS: DESCRIPTORS: ACHING;DISCOMFORT

## 2021-12-14 ASSESSMENT — PAIN DESCRIPTION - FREQUENCY: FREQUENCY: CONTINUOUS

## 2021-12-14 ASSESSMENT — PAIN DESCRIPTION - ONSET: ONSET: ON-GOING

## 2021-12-14 ASSESSMENT — PAIN SCALES - GENERAL: PAINLEVEL_OUTOF10: 6

## 2021-12-14 ASSESSMENT — PAIN DESCRIPTION - LOCATION: LOCATION: BACK

## 2021-12-14 ASSESSMENT — PAIN - FUNCTIONAL ASSESSMENT: PAIN_FUNCTIONAL_ASSESSMENT: PREVENTS OR INTERFERES SOME ACTIVE ACTIVITIES AND ADLS

## 2021-12-14 ASSESSMENT — PAIN DESCRIPTION - PROGRESSION: CLINICAL_PROGRESSION: NOT CHANGED

## 2021-12-14 NOTE — PROGRESS NOTES
Pharmacy Note    Contrerassloan Jaguar was ordered Miacalcin nasal spray. Per the [address], this medication is non-formulary and not stocked by pharmacy for the reason indicated below. The medication can be reordered at discharge.      Medications in which risks outweigh benefits during hospitalization:         -  oral bisphosphonates         -  raloxifene (Evista)      Medications that lack necessity during an acute hospital stay:      -  nasal antihistamines        -  nasal ipratropium 0.03% and 0.06%        -  nasal miacalcin        -  acyclovir topical cream/ointment orders for herpes labialis (cold sores)  Yamile Mao, [facility], [facility] 12/14/2021 6:06 AM

## 2021-12-14 NOTE — ED NOTES
Assumed care of patient. Introduced self to patient as RN.  Patient expresses no needs at this time     Rogelio Gibbons RN  12/14/21 3710

## 2021-12-14 NOTE — PROGRESS NOTES
Admission database completed to best of this RN's ability with assistance from the patient's caregiver, Leonor Newton. Care plan and education initiated. Per caregiver, she believes that the patient's confusion and increased difficulty with ambulation and ADLs is coming from the pain meds and muscle relaxers the patient was recently prescribed for his back pain (kyphoplasty 10/21). She states that he would occasionally use a cane in the past but is now requiring it more often along with a wheelchair and [employer] at times. They attempted to get the patient into outpatient PT/OT but insurance would not cover it. Caregiver is requesting patient potentially go to an inpatient rehab facility upon discharge.     Specialist:  Dr. Nikole Smart - urologist

## 2021-12-14 NOTE — PROGRESS NOTES
OCCUPATIONAL THERAPY INITIAL EVALUATION     [facility]  Malickmatthewsa Chaveshardik [address]         QVOR:                                                  Patient Name: Zeus Logan    MRN: [id number]    : 1928    Room: [address] [place] [address]      Evaluating OT: Marleni Woodward, OTR/L [id number]      Referring Provider:Faisal Shah DO    Specific Provider Orders/Date: 2021 OT eval and treat       Diagnosis: UTI     Surgery/ Pertinent Medical History: Kyphoplasty  at [facility], prostate CA, Hx of spinal fracture      Past Medical History:   Diagnosis Date    History of prostate cancer 1970    radiation for 1 month and then seed implants     History of spinal fracture     [place] (hard of hearing)     Hypertension     Hypothyroidism     Leg cramps     [facility] cancer ([facility] [address]     Pulmonary emboli ([facility] [address]          Past Surgical History:   Procedure Laterality Date    COLONOSCOPY      [place]    HAND TENDON SURGERY      HERNIA REPAIR  age 15   [address]    RIGHT , WITH MESH    KNEE ARTHROSCOPY      MASTOID SURGERY      OTHER SURGICAL HISTORY  approx 8 yrs ago    prostate radiation and implanted seeds    OTHER SURGICAL HISTORY Right 2017    head laceration       Precautions:  Fall Risk, [place], bed alarm     Assessment of current deficits    [] Functional mobility  [x]ADLs  [x] Strength               [x]Cognition    [x] Functional transfers   [x] IADLs         [x] Safety Awareness   [x]Endurance    [x] Fine Coordination              [x] Balance      [] Vision/perception   []Sensation     []Gross Motor Coordination  [] ROM  [] Delirium                   [] Motor Control     OT PLAN OF CARE   OT POC based on physician orders, patient diagnosis and results of clinical assessment    Frequency/Duration 2-5 days/wk for 2 weeks PRN   Specific OT Treatment Interventions to include:   * Instruction/training on adapted ADL techniques and AE recommendations to increase functional independence within precautions       * Training on energy conservation strategies, correct breathing pattern and techniques to improve independence/tolerance for self-care routine  * Functional transfer/mobility training/DME recommendations for increased independence, safety, and fall prevention  * Patient/Family education to increase follow through with safety techniques and functional independence  * Recommendation of environmental modifications for increased safety with functional transfers/mobility and ADLs  * Therapeutic exercise to improve motor endurance, ROM, and functional strength for ADLs/functional transfers  * Therapeutic activities to facilitate/challenge dynamic balance, stand tolerance for increased safety and independence with ADLs  * Therapeutic activities to facilitate gross/fine motor skills for increased independence with ADLs    Recommended Adaptive Equipment:  TBD     Home Living: Pt lives with his wife in a 1.5 level home with \"a few\" steps into home with a rail. Pt reported that his wife had a stroke 3 years ago and has assistance from a caregiver in the home. Bed on second floor - pt reports he walks up steps  Bathroom setup: walk in shower, traditional toilet    Equipment owned: Rollator, w/c    Prior Level of Function: I with ADLs , I with IADLs; ambulated with Rollator or w/c in home   Driving: \"slowly giving it up\"  Occupation: retired    Pain Level: 0/10 at rest;  Cognition: A&O: grossly2/4;  Indicated he was at [facility], noted year to be 23 something   Follows 1 step directions        Functional Assessment:  AM-PAC Daily Activity Raw Score: 17/24   Initial Eval Status  Date: 12/14/21 Treatment Status  Date: STGs = LTGs  Time frame: 10-14 days   Feeding Independent      Grooming Minimal Assist   Independent    UB Dressing Minimal Assist   gown management   Independent    LB Dressing Moderate Assist   Independent    Bathing Moderate Assist  Independent    Toileting Minimal Assist   Independent    Bed Mobility  Supine to sit: Stand by Assist   Sit to supine: Stand by Assist   Supine to sit: Independent   Sit to supine: Independent    Functional Transfers Minimal Assist   Sit to stand  Independent    Functional Mobility Minimal Assist with FWW to commode and back  Independent    Balance Sitting:     Static:  good    Dynamic:good-  Standing: Min A      Activity Tolerance Fair for light activity  Good- to complete self care tasks   Visual/  Perceptual Glasses: yes                Hand Dominance R   AROM (PROM) Strength Additional Info:    RUE  WFL 3+/5 good  and wfl FMC/dexterity noted during ADL tasks       LUE WFL 3+/5 good  and wfl FMC/dexterity noted during ADL tasks       Hearing: [place]  Sensation:  No c/o numbness or tingling   Tone: WFL   Edema: none noted     Comments: Upon arrival patient supine in bed and agreeable to therapy with nursing consent. Pt ambulated to restroom with Min A. He required Min A to stand from commode and transfer back to restroom. At end of session, patient supine in bed with bed alarm on and  with call light and phone within reach, all lines and tubes intact. Overall patient demonstrated  decreased independence and safety during completion of ADL/functional transfer/mobility tasks. Pt would benefit from continued skilled OT to increase safety and independence with completion of ADL/IADL tasks for functional independence and quality of life. Treatment: OT treatment provided this date includes:    Instruction/training on safety and adapted techniques for completion of ADLs:     Instruction/training on safe functional mobility/transfer techniques:          Rehab Potential: Good  for established goals     Patient / Family Goal: none stated       Patient and/or family were instructed on functional diagnosis, prognosis/goals and OT plan of care. Demonstrated good understanding.      Eval Complexity: Low    Time In: 20:30  Time Out: 20:55  Total Treatment Time: 25    Min Units   OT Eval Low 86668  15  1   OT Eval Medium 12145      OT Eval High 17215      OT Re-Eval [id number]       Therapeutic Ex 82892       Therapeutic Activities 58520  10  1   ADL/Self Care 10818       Orthotic Management 00302       Manual [id number]     Neuro Re-Ed 05160       Non-Billable Time          Evaluation Time additionally includes thorough review of current medical information, gathering information on past medical history/social history and prior level of function, interpretation of standardized testing/informal observation of tasks, assessment of data and development of plan of care and goals.           Mitzy Lan, OTR/L [id number]

## 2021-12-14 NOTE — ED PROVIDER NOTES
HPI:  12/13/21,   Time: 8:33 PM GARO Campos is a 80 y.o. male presenting to the ED for hematuria. Family at bedside states that she noticed blood clots in patient's diaper last night when she was changing him. Patient has a history of recent back injury with kyphoplasty in October, since then has been experiencing chronic left lower back pain, no acute changes in back pain. Family also states that patient has had a mental decline over the past couple months as well, with increasing confusion and difficulty caring for himself, for which they have been following with primary team.  No fevers, no cough or cold-like symptoms, no vomiting, no other reported complaints. ROS:   GEN: no fevers  HENT: No trauma, no URI-like symptoms  EYES: No changes in vision, no pain  CARDIO: No chest pain, no palpitations  PULM: No trouble breathing, no wheezing  ABD: No pain, no nausea, no vomiting  : See HPI  MSK: + Chronic left lower back pain, no trauma, no deformities  SKIN: No rashes, no abrasions, no lacerations  NEURO: no headache, no weakness. + Confusion    --------------------------------------------- PAST HISTORY ---------------------------------------------  Past Medical History:  has a past medical history of History of prostate cancer, History of spinal fracture, [place] (hard of hearing), Hypertension, Hypothyroidism, Leg cramps, Prostate cancer ([facility] [address] and Pulmonary emboli ([facility] [address] Past Surgical History:  has a past surgical history that includes other surgical history (approx 8 yrs ago); Knee arthroscopy; Hand tendon surgery; Inguinal hernia repair (6/7/12); Colonoscopy (2014); Mastoid surgery; other surgical history (Right, 04/30/2017); and hernia repair (age 15). Social History:  reports that he has never smoked. He has never used smokeless tobacco. He reports current alcohol use. He reports that he does not use drugs.     Family History: family history includes Cancer in his brother and father; Heart Attack in his daughter; Stroke in his mother. The patients home medications have been reviewed.     Allergies: Eggs or egg-derived products, Eggs [egg white], Sulfa antibiotics, and Sulfa antibiotics    -------------------------------------------------- RESULTS -------------------------------------------------  All laboratory and radiology results have been personally reviewed by myself   LABS:  Results for orders placed or performed during the hospital encounter of 12/13/21   CBC Auto Differential   Result Value Ref Range    WBC 4.6 4.5 - 11.5 E9/L    RBC 5.07 3.80 - 5.80 E12/L    Hemoglobin 14.1 12.5 - 16.5 g/dL    Hematocrit 43.9 37.0 - 54.0 %    MCV 86.6 80.0 - 99.9 fL    MCH 27.8 26.0 - 35.0 pg    MCHC 32.1 32.0 - 34.5 %    RDW 14.9 11.5 - 15.0 fL    Platelets 005 109 - 329 E9/L    MPV 9.0 7.0 - 12.0 fL    Neutrophils % 50.4 43.0 - 80.0 %    Immature Granulocytes % 0.2 0.0 - 5.0 %    Lymphocytes % 38.7 20.0 - 42.0 %    Monocytes % 10.3 2.0 - 12.0 %    Eosinophils % 0.2 0.0 - 6.0 %    Basophils % 0.2 0.0 - 2.0 %    Neutrophils Absolute 2.30 1.80 - 7.30 E9/L    Immature Granulocytes # 0.01 E9/L    Lymphocytes Absolute 1.77 1.50 - 4.00 E9/L    Monocytes Absolute 0.47 0.10 - 0.95 E9/L    Eosinophils Absolute 0.01 (L) 0.05 - 0.50 E9/L    Basophils Absolute 0.01 0.00 - 0.20 E9/L   Comprehensive Metabolic Panel   Result Value Ref Range    Sodium 136 132 - 146 mmol/L    Potassium 4.3 3.5 - 5.0 mmol/L    Chloride 99 98 - 107 mmol/L    CO2 26 22 - 29 mmol/L    Anion Gap 11 7 - 16 mmol/L    Glucose 103 (H) 74 - 99 mg/dL    BUN 24 (H) 6 - 23 mg/dL    CREATININE 0.8 0.7 - 1.2 mg/dL    GFR Non-African American >60 >=60 mL/min/1.73    GFR African American >60     Calcium 10.0 8.6 - 10.2 mg/dL    Total Protein 7.3 6.4 - 8.3 g/dL    Albumin 4.0 3.5 - 5.2 g/dL    Total Bilirubin 0.6 0.0 - 1.2 mg/dL    Alkaline Phosphatase 146 (H) 40 - 129 U/L    ALT 23 0 - 40 U/L    AST 25 0 - 39 U/L   Troponin   Result Value Ref Range    Troponin, High Sensitivity 27 (H) 0 - 11 ng/L   Urinalysis   Result Value Ref Range    Color, UA BLOODY Straw/Yellow    Clarity, UA CLOUDY (A) Clear    Glucose, Ur 250 (A) Negative mg/dL    Bilirubin Urine LARGE (A) Negative    Ketones, Urine 15 (A) Negative mg/dL    Specific Gravity, UA 1.020 1.005 - 1.030    Blood, Urine LARGE (A) Negative    pH, UA 6.5 5.0 - 9.0    Protein, UA >=300 (A) Negative mg/dL    Urobilinogen, Urine 4.0 (A) <2.0 E.U./dL    Nitrite, Urine POSITIVE (A) Negative    Leukocyte Esterase, Urine MODERATE (A) Negative   Lactic Acid, Plasma   Result Value Ref Range    Lactic Acid 1.4 0.5 - 2.2 mmol/L   Troponin   Result Value Ref Range    Troponin, High Sensitivity 25 (H) 0 - 11 ng/L   Microscopic Urinalysis   Result Value Ref Range    WBC, UA 2-5 0 - 5 /HPF    RBC, UA >20 0 - 2 /HPF    Epithelial Cells, UA NONE SEEN /HPF    Bacteria, UA FEW (A) None Seen /HPF   EKG 12 Lead   Result Value Ref Range    Ventricular Rate 88 BPM    Atrial Rate 88 BPM    P-R Interval 180 ms    QRS Duration 102 ms    Q-T Interval 362 ms    QTc Calculation (Bazett) 438 ms    P Axis 76 degrees    R Axis 53 degrees    T Axis 81 degrees       RADIOLOGY:  Interpreted by Radiologist.  CT ABDOMEN PELVIS WO CONTRAST Additional Contrast? None   Final Result   1. No radiopaque renal or ureteral stone identified. No hydronephrosis. 2. Large hiatal hernia. No evidence of obstruction. RECOMMENDATIONS:   Unavailable         CT HEAD WO CONTRAST   Final Result   No acute intracranial abnormality. Cortical atrophy and periventricular leukomalacia. RECOMMENDATIONS:   Unavailable         XR CHEST PORTABLE   Final Result   1. Emphysematous changes. There are no findings of failure or pneumonia   2. Cardiomegaly   3.  Very large hiatal hernia.             ------------------------- NURSING NOTES AND VITALS REVIEWED ---------------------------   The nursing notes within the ED encounter and vital signs as below have been reviewed.    /84   Pulse 87   Temp 97.7 °F (36.5 °C) (Oral)   Resp 16   Ht 5' 9\" (1.753 m)   Wt 118 lb (53.5 kg)   SpO2 96%   BMI 17.43 kg/m²   Oxygen Saturation Interpretation: Normal    ---------------------------------------------------PHYSICAL EXAM--------------------------------------    GEN: No acute distress, well-appearing, well nourished   HENT: Normocephalic, atraumatic, oral mucosa moist  EYES: No scleral injection, no scleral icterus, PERRL   PULM: Lungs clear to ascultation bilaterally, no wheezes, no crackles  CARDIO: Regular rate, regular rhythm, normal S1/S2  ABD: Soft, non-tender, no rigidity  MSK: No deformities, no edema, palpable pulses all extremities   SKIN: No rashes, no lacerations, no abrasions   NEURO: Awake, alert, appropriate, cranial nerves II through XII grossly intact, no gross focal deficits        ------------------------------ ED COURSE/MEDICAL DECISION MAKING----------------------  Medications   sodium chloride flush 0.9 % injection 10 mL (10 mLs IntraVENous Given 12/14/21 0013)   sodium chloride flush 0.9 % injection 10 mL (has no administration in time range)   0.9 % sodium chloride infusion (25 mLs IntraVENous New Bag 12/14/21 0012)   potassium chloride (KLOR-CON M) extended release tablet 40 mEq (has no administration in time range)     Or   potassium bicarb-citric acid (EFFER-K) effervescent tablet 40 mEq (has no administration in time range)     Or   potassium chloride 10 mEq/100 mL IVPB (Peripheral Line) (has no administration in time range)   ondansetron (ZOFRAN-ODT) disintegrating tablet 4 mg (has no administration in time range)     Or   ondansetron (ZOFRAN) injection 4 mg (has no administration in time range)   senna (SENOKOT) tablet 8.6 mg (has no administration in time range)   acetaminophen (TYLENOL) tablet 650 mg (has no administration in time range)     Or   acetaminophen (TYLENOL) suppository 650 mg (has no administration in time range)   0.9 their ED course. Counseling: The emergency provider has spoken with the patient and family and discussed todays results, in addition to providing specific details for the plan of care and counseling regarding the diagnosis and prognosis. Questions are answered at this time and they are agreeable with the plan.      --------------------------------- IMPRESSION AND DISPOSITION ---------------------------------    IMPRESSION  1. Altered mental status, unspecified altered mental status type    2. Acute cystitis with hematuria    3.  Hypotension, unspecified hypotension type        DISPOSITION  Disposition: Admit to med/surg floor  Patient condition is fair        Emelina Britt DO  12/14/21 [id number]

## 2021-12-14 NOTE — ED NOTES
Medication list reviewed with caregiver, Marimar Fine, and medications listed are the only ones that pt is taking at this time.      Matt Johnson RN  12/14/21 1324

## 2021-12-14 NOTE — H&P
Internal Medicine History & Physical     Name: Janneth Elam  : 1928  Chief Complaint: Hematuria (with clots since last night), Altered Mental Status (recent med changes), Back Pain, and Hypotension (82/58 here)  Primary Care Physician: Paramjit Ward MD  Admission date: 2021  Date of service: 2021     History of Present Illness  Sarina Carlson is a 80y.o. year old male with a PMH of prostate cancer, hypertension, hypothyroidism, history of PE(on Eliquis) who presented with a chief complaint of hematuria, altered mental status, abdominal pain. Patient describes his pain is suprapubic, nonradiating, rated 5 out of 10 no alleviating or exacerbating factors. Patient had a recent back surgery in October and for the past couple months he had a increase in mental decline. Patient is cooperative during exam however he is only alert to self not time or place. He denies any other symptoms aside from back pain which is chronic since his back injury and kyphoplasty surgery in October. In the ED patient was hypotensive blood pressure was improved after IV fluids. Urinalysis consistent with UTI he was started on Rocephin in the ED. Patient felt her mental status did improve throughout stay. The patient was admitted for UTI.       Past Medical History:   Diagnosis Date    History of prostate cancer 1970    radiation for 1 month and then seed implants     History of spinal fracture     [place] (hard of hearing)     Hypertension     Hypothyroidism     Leg cramps     Prostate cancer ([address]     Pulmonary emboli ([address]        Past Surgical History:   Procedure Laterality Date    COLONOSCOPY      [place]    HAND TENDON SURGERY      [facility]  age 15   [facility]    RIGHT , WITH MESH    KNEE ARTHROSCOPY      MASTOID SURGERY      OTHER SURGICAL HISTORY  approx 8 yrs ago    prostate radiation and implanted seeds    OTHER SURGICAL HISTORY Right 2017    head laceration        Family History   Problem Relation Age of Onset    Heart Attack Daughter     Stroke Mother     Cancer Father         lung, smoking     Cancer Brother         throat, smoking          Social History  Patient lives at home   At baseline patient ambulates without assistance  Illicit drugs: Denies   TOBACCO:   reports that he has never smoked. He has never used smokeless tobacco.  ETOH:   reports current alcohol use. Home Medications  Prior to Admission medications    Medication Sig Start Date End Date Taking?  Authorizing Provider   bicalutamide (CASODEX) 50 MG chemo tablet Take 50 mg by mouth daily 11/19/21  Yes Historical Provider, MD   ELIQUIS 2.5 MG TABS tablet TAKE 1 TABLET BY MOUTH TWICE DAILY 10/28/21  Yes Salvatore Garcia MD   levothyroxine (SYNTHROID) 100 MCG tablet TAKE 1 TABLET BY MOUTH EVERY DAY 7/29/21  Yes Salvatore Garcia MD   calcitonin (MIACALCIN) 200 UNIT/ACT nasal spray 1 spray by Nasal route daily 12/7/21   Salvatore Garcia MD   vitamin B-12 (CYANOCOBALAMIN) 1000 MCG tablet Take 1,000 mcg by mouth daily    Historical Provider, MD   Multiple Vitamins-Minerals (PRESERVISION AREDS PO) Take by mouth daily    Historical Provider, MD   Turmeric 500 MG CAPS Take by mouth daily    Historical Provider, MD   Omega-3 Fatty Acids (FISH OIL) 1000 MG CAPS Take 1,000 mg by mouth daily    Historical Provider, MD   diclofenac sodium (VOLTAREN) 1 % GEL Apply 2 g topically 4 times daily  Patient not taking: Reported on 12/2/2021 8/10/20   Salvatore Garcia MD   Handicap Placard MISC by Does not apply route PERMANENT    Historical Provider, MD   calcium carbonate (OSCAL) 500 MG TABS tablet Take 500 mg by mouth daily    Historical Provider, MD   B Complex-C (SUPER B COMPLEX PO) Take by mouth    Historical Provider, MD   magnesium gluconate (MAGONATE) 500 MG tablet Take 500 mg by mouth 2 times daily    Historical Provider, MD   leuprolide (LUPRON) 45 MG injection Inject 45 mg into the muscle    Historical Provider, MD   acetaminophen (TYLENOL) 325 MG tablet Take 650 mg by mouth every 6 hours as needed for Pain    Historical Provider, MD       Allergies  Allergies   Allergen Reactions    Eggs Or Egg-Derived Products     Eggs [Egg White] Other (See Comments)     Violently ill for 1 week to 10 days. No flu shots    Sulfa Antibiotics Other (See Comments)     Weakness, violently ill    Sulfa Antibiotics        Review of Systems  Please see HPI above. All bolded are positive. All un-bolded are negative.   Constitutional Symptoms: fever, chills, fatigue, generalized weakness, diaphoresis, increase in thirst, loss of appetite  Eyes: vision change   Ears, Nose, Mouth, Throat: hearing loss, nasal congestion, sores in the mouth  Cardiovascular: chest pain, chest heaviness, palpitations  Respiratory: shortness of breath, wheezing, coughing  Gastrointestinal: abdominal pain, nausea, vomiting, diarrhea, constipation, melena, hematochezia, hematemesis  Genitourinary: dysuria, hematuria, increased frequency  Musculoskeletal: lower extremity edema, myalgias, arthralgias, back pain  Integumentary: rashes, itching   Neurological: headache, lightheadedness, dizziness, confusion, syncope, numbness, tingling, focal weakness  Psychiatric: depression, suicidal ideation, anxiety  Endocrine: unintentional weight change  Hematologic/Lymphatic: lymphadenopathy, easy bruising, easy bleeding   Allergic/Immunologic: recurrent infections      Objective  VITALS:  /84   Pulse 87   Temp 97.7 °F (36.5 °C) (Oral)   Resp 16   Ht 5' 9\" (1.753 m)   Wt 118 lb (53.5 kg)   SpO2 96%   BMI 17.43 kg/m²     Physical Exam:  General: awake, alert, oriented to person but notplace, time, and purpose, appears stated age, cooperative, no acute distress, pleasant, appropriate mood  Eyes: conjunctivae/corneas clear, sclera non icteric, EOMI  Ears: no obvious scars, no lesions, no masses, hearing intact  Mouth: mucous membranes moist, no obvious oral sores  Head: normocephalic, atraumatic  Neck: no JVD, no adenopathy, no thyromegaly, neck is supple, trachea is midline  Back: ROM normal, no CVA tenderness, no step-off deformities, no mid spinal tenderness. Chest: no pain on palpation  Lungs: clear to auscultation bilaterally, without rhonchi, crackle, wheezing, or rale, no retractions or use of accessory muscles  Heart: regular rate and regular rhythm, no murmur, normal S1, S2  Abdomen: soft, suprapubic tenderness,; bowel sounds normal; no masses, no organomegaly  : Deferred   Extremities: no lower extremity edema, extremities atraumatic, no cyanosis, no clubbing, 2+ pedal pulses palpated  Skin: normal color, normal texture, normal turgor, no rashes, no lesions  Neurologic:5/5 muscle strength throughout, normal muscle tone throughout, face symmetric, hearing intact, tongue midline, speech appropriate without slurring, sensation to fine touch intact in upper and lower extremities    Labs-   Lab Results   Component Value Date    WBC 4.6 12/13/2021    HGB 14.1 12/13/2021    HCT 43.9 12/13/2021     12/13/2021     12/13/2021    K 4.3 12/13/2021    CL 99 12/13/2021    CREATININE 0.8 12/13/2021    BUN 24 (H) 12/13/2021    CO2 26 12/13/2021    GLUCOSE 103 (H) 12/13/2021    ALT 23 12/13/2021    AST 25 12/13/2021    INR 1.0 12/05/2019     Lab Results   Component Value Date    CKTOTAL 29 01/31/2016    CKMB 1.4 01/31/2016    TROPONINI <0.01 08/13/2020       Last echocardiogram: 10-  Summary  Indeterminate diastolic function. Ejection fraction is visually estimated at 60-65%. Normal left atrium. Mild centrally directed mitral regurgitation. AV area= 1.3 cm2  Moderate aortic stenosis. No pulmonary hypertension  No pericardial effusion. Recent Radiological Studies:  CT ABDOMEN PELVIS WO CONTRAST Additional Contrast? None   Final Result   1. No radiopaque renal or ureteral stone identified. No hydronephrosis. 2. Large hiatal hernia.   No evidence of obstruction. RECOMMENDATIONS:   Unavailable         CT HEAD WO CONTRAST   Final Result   No acute intracranial abnormality. Cortical atrophy and periventricular leukomalacia. RECOMMENDATIONS:   Unavailable         XR CHEST PORTABLE   Final Result   1. Emphysematous changes. There are no findings of failure or pneumonia   2. Cardiomegaly   3. Very large hiatal hernia.              Assessment  Active Hospital Problems    Diagnosis     History of DVT (deep vein thrombosis) [Z86.718]      Priority: High    Dehydration [E86.0]      Priority: Medium    UTI (urinary tract infection) [N39.0]     Arthritis [M19.90]     Prostate cancer ([facility] [address] [C61]     Chronic anticoagulation [Z79.01]     Hypertensive disorder [I10]     Hx of pulmonary embolus [Z86.711]     Carotid artery stenosis [I65.29]     [place] (hard of hearing) [H91.90]     History of prostate cancer [Z85.46]     Hypothyroidism [E03.9]        Patient Active Problem List    Diagnosis Date Noted    History of DVT (deep vein thrombosis) 02/01/2016     Priority: High    Dehydration 12/14/2021     Priority: Medium    UTI (urinary tract infection) 12/14/2021    Arthritis 04/10/2020    Prostate cancer ([facility] 09/11/2019    Chronic anticoagulation     Hx of pulmonary embolus 05/02/2017    Hypertensive disorder 05/02/2017    Carotid artery stenosis 05/02/2017    History of prostate cancer      prostate      [place] (hard of hearing)     Hypothyroidism 11/09/2012       Plan  · UTI  · Continue Rocephin   · Await cultures results   · Monitor for s/s urinary retention   · Please bladder scan patient   · Physical deconditioning   · Dementia   · Decreased functional status   · Hypothyroidism   · PT/OT  · Home medications to be reconciled and confirmed prior to being ordered  · DVT prophylaxis   · Code Status Full  · Discharge plan: TBD pending clinical improvement      The patient was seen and evaluated by myself and Dr. Michela Rodrigez, MD PGY-1  12/14/2021  1:15 PM             Addendum: I have personally participated in a face-to-face history and physical exam on the date of service with the patient. I have discussed the case with the resident. I also participated in medical decision making with the resident on the date of service and I agree with all of the pertinent clinical information unless indicated in my editing of the note. I have reviewed and edited the note above based on my findings during my history, exam, and decision making on the same day of service. The vitals, labs, imaging, medications and treatment plan were reviewed independently by myself in addition to with the resident doctor. I agree with the above documentation and treatment plan     Electronically signed by Alber Ingram MD on 12/14/2021 at 3:37 PM    I can be reached through [address] [place] [address].

## 2021-12-14 NOTE — PROGRESS NOTES
Pt was incontinent of large amount of urine. PVR was 0cc.     Electronically signed by Catrachito Mejia RN on 12/14/2021 at 5:13 PM

## 2021-12-15 LAB
ALBUMIN SERPL-MCNC: 3.4 G/DL (ref 3.5–5.2)
ALP BLD-CCNC: 128 U/L (ref 40–129)
ALT SERPL-CCNC: 15 U/L (ref 0–40)
ANION GAP SERPL CALCULATED.3IONS-SCNC: 13 MMOL/L (ref 7–16)
AST SERPL-CCNC: 19 U/L (ref 0–39)
BASOPHILS ABSOLUTE: 0.01 E9/L (ref 0–0.2)
BASOPHILS RELATIVE PERCENT: 0.2 % (ref 0–2)
BILIRUB SERPL-MCNC: 0.6 MG/DL (ref 0–1.2)
BUN BLDV-MCNC: 10 MG/DL (ref 6–23)
CALCIUM SERPL-MCNC: 9.4 MG/DL (ref 8.6–10.2)
CHLORIDE BLD-SCNC: 101 MMOL/L (ref 98–107)
CO2: 24 MMOL/L (ref 22–29)
CREAT SERPL-MCNC: 0.6 MG/DL (ref 0.7–1.2)
EKG ATRIAL RATE: 88 BPM
EKG P AXIS: 76 DEGREES
EKG P-R INTERVAL: 180 MS
EKG Q-T INTERVAL: 362 MS
EKG QRS DURATION: 102 MS
EKG QTC CALCULATION (BAZETT): 438 MS
EKG R AXIS: 53 DEGREES
EKG T AXIS: 81 DEGREES
EKG VENTRICULAR RATE: 88 BPM
EOSINOPHILS ABSOLUTE: 0.05 E9/L (ref 0.05–0.5)
EOSINOPHILS RELATIVE PERCENT: 1.1 % (ref 0–6)
GFR AFRICAN AMERICAN: >60
GFR NON-AFRICAN AMERICAN: >60 ML/MIN/1.73
GLUCOSE BLD-MCNC: 90 MG/DL (ref 74–99)
HCT VFR BLD CALC: 38.8 % (ref 37–54)
HEMOGLOBIN: 12.6 G/DL (ref 12.5–16.5)
IMMATURE GRANULOCYTES #: 0.01 E9/L
IMMATURE GRANULOCYTES %: 0.2 % (ref 0–5)
LYMPHOCYTES ABSOLUTE: 1.57 E9/L (ref 1.5–4)
LYMPHOCYTES RELATIVE PERCENT: 35.8 % (ref 20–42)
MCH RBC QN AUTO: 28 PG (ref 26–35)
MCHC RBC AUTO-ENTMCNC: 32.5 % (ref 32–34.5)
MCV RBC AUTO: 86.2 FL (ref 80–99.9)
MONOCYTES ABSOLUTE: 0.51 E9/L (ref 0.1–0.95)
MONOCYTES RELATIVE PERCENT: 11.6 % (ref 2–12)
NEUTROPHILS ABSOLUTE: 2.24 E9/L (ref 1.8–7.3)
NEUTROPHILS RELATIVE PERCENT: 51.1 % (ref 43–80)
PDW BLD-RTO: 14.7 FL (ref 11.5–15)
PLATELET # BLD: 284 E9/L (ref 130–450)
PMV BLD AUTO: 9.2 FL (ref 7–12)
POTASSIUM REFLEX MAGNESIUM: 3.6 MMOL/L (ref 3.5–5)
RBC # BLD: 4.5 E12/L (ref 3.8–5.8)
SODIUM BLD-SCNC: 138 MMOL/L (ref 132–146)
TOTAL PROTEIN: 6.3 G/DL (ref 6.4–8.3)
WBC # BLD: 4.4 E9/L (ref 4.5–11.5)

## 2021-12-15 PROCEDURE — 80053 COMPREHEN METABOLIC PANEL: CPT

## 2021-12-15 PROCEDURE — 6360000002 HC RX W HCPCS: Performed by: EMERGENCY MEDICINE

## 2021-12-15 PROCEDURE — 97161 PT EVAL LOW COMPLEX 20 MIN: CPT

## 2021-12-15 PROCEDURE — G0378 HOSPITAL OBSERVATION PER HR: HCPCS

## 2021-12-15 PROCEDURE — 96376 TX/PRO/DX INJ SAME DRUG ADON: CPT

## 2021-12-15 PROCEDURE — 2580000003 HC RX 258: Performed by: INTERNAL MEDICINE

## 2021-12-15 PROCEDURE — 36415 COLL VENOUS BLD VENIPUNCTURE: CPT

## 2021-12-15 PROCEDURE — 6360000002 HC RX W HCPCS

## 2021-12-15 PROCEDURE — 85025 COMPLETE CBC W/AUTO DIFF WBC: CPT

## 2021-12-15 PROCEDURE — 6370000000 HC RX 637 (ALT 250 FOR IP): Performed by: INTERNAL MEDICINE

## 2021-12-15 PROCEDURE — 93010 ELECTROCARDIOGRAM REPORT: CPT | Performed by: INTERNAL MEDICINE

## 2021-12-15 PROCEDURE — 2580000003 HC RX 258: Performed by: EMERGENCY MEDICINE

## 2021-12-15 PROCEDURE — 1200000000 HC SEMI PRIVATE

## 2021-12-15 RX ORDER — CEFTRIAXONE 1 G/1
INJECTION, POWDER, FOR SOLUTION INTRAMUSCULAR; INTRAVENOUS
Status: DISPENSED
Start: 2021-12-15 | End: 2021-12-15

## 2021-12-15 RX ADMIN — BICALUTAMIDE 50 MG: 50 TABLET ORAL at 08:33

## 2021-12-15 RX ADMIN — Medication 500 MG: at 08:33

## 2021-12-15 RX ADMIN — Medication 10 ML: at 20:20

## 2021-12-15 RX ADMIN — CALCIUM 500 MG: 500 TABLET ORAL at 08:00

## 2021-12-15 RX ADMIN — Medication 500 MG: at 20:19

## 2021-12-15 RX ADMIN — LEVOTHYROXINE SODIUM 100 MCG: 100 TABLET ORAL at 07:22

## 2021-12-15 RX ADMIN — APIXABAN 2.5 MG: 5 TABLET, FILM COATED ORAL at 20:19

## 2021-12-15 RX ADMIN — DICLOFENAC SODIUM 2 G: 10 GEL TOPICAL at 20:19

## 2021-12-15 RX ADMIN — DICLOFENAC SODIUM 2 G: 10 GEL TOPICAL at 08:33

## 2021-12-15 RX ADMIN — DICLOFENAC SODIUM 2 G: 10 GEL TOPICAL at 13:08

## 2021-12-15 RX ADMIN — APIXABAN 2.5 MG: 5 TABLET, FILM COATED ORAL at 08:22

## 2021-12-15 RX ADMIN — Medication 10 ML: at 07:23

## 2021-12-15 RX ADMIN — DICLOFENAC SODIUM 2 G: 10 GEL TOPICAL at 16:41

## 2021-12-15 RX ADMIN — CEFTRIAXONE 1000 MG: 1 INJECTION, POWDER, FOR SOLUTION INTRAMUSCULAR; INTRAVENOUS at 02:34

## 2021-12-15 ASSESSMENT — PAIN SCALES - GENERAL
PAINLEVEL_OUTOF10: 0
PAINLEVEL_OUTOF10: 4
PAINLEVEL_OUTOF10: 0

## 2021-12-15 NOTE — CARE COORDINATION
Social work / Discharge Planning:       Patient accepted by [facility]. No precert needed but will need updated therapy notes and a negative covid result prior to discharge. ELLY, [id number] and ambulette form completed.    Electronically signed by YASMANY Salas on 12/15/2021 at 2:45 PM

## 2021-12-15 NOTE — DISCHARGE INSTR - COC
Continuity of Care Form    Patient Name: Dawit Messer   :  1928  MRN:  [id number]    [facility] date:  2021  Discharge date:  ***    Code Status Order: Full Code   Advance Directives:      Admitting Physician:  Bruno Coto DO  PCP: Brigid Pizano MD    Discharging Nurse: [facility] Unit/Room#: 3219/6216-R  Discharging Unit Phone Number: ***    Emergency Contact:   Extended Emergency Contact Information  Primary Emergency Contact: [address], [facility] Phone: [phone]  Relation: Other  Secondary Emergency Contact: Alyssa Hunt  Address: [address] [place] [address] [address], [address] Phone: [phone]  Relation: Spouse    Past Surgical History:  Past Surgical History:   Procedure Laterality Date    COLONOSCOPY      [address]  age 15    [facility]  12    RIGHT , WITH MESH    KNEE ARTHROSCOPY      MASTOID SURGERY      OTHER SURGICAL HISTORY  approx 8 yrs ago    prostate radiation and implanted seeds    OTHER SURGICAL HISTORY Right 2017    head laceration        Immunization History:   Immunization History   Administered Date(s) Administered    COVID-19, Pfizer, PF, 30mcg/0.3mL 2021, 2021    Pneumococcal Polysaccharide ([id number]) 2013    Td, unspecified formulation 1997    Tdap (Boostrix, Adacel) 2013       Active Problems:  Patient Active Problem List   Diagnosis Code    Hypothyroidism E03.9    History of prostate cancer Z85.46    [place] (hard of hearing) H91.90    History of DVT (deep vein thrombosis) Z86.718    Hx of pulmonary embolus Z86.711    Hypertensive disorder I10    Carotid artery stenosis I65.29    Chronic anticoagulation Z79.01    Prostate cancer ([facility] [address] C61    Arthritis M19.90    UTI (urinary tract infection) N39.0    Dehydration E86.0       Isolation/Infection:   Isolation            No Isolation          Patient Infection Status       Infection Onset Added Last Indicated Last Indicated By Review Planned Expiration Resolved Resolved By    None active    Resolved    COVID-19 (Rule Out) 11/11/21 11/11/21 11/10/21 COVID-19 Ambulatory (Ordered)   11/12/21 Rule-Out Test Resulted            Nurse Assessment:  Last Vital Signs: BP 99/71   Pulse 93   Temp 97.7 °F (36.5 °C) (Oral)   Resp 16   Ht 5' 9\" (1.753 m)   Wt 115 lb (52.2 kg)   SpO2 95%   BMI 16.98 kg/m²     Last documented pain score (0-10 scale): Pain Level: 0  Last Weight:   Wt Readings from Last 1 Encounters:   12/15/21 115 lb (52.2 kg)     Mental Status:  oriented, alert, and disoriented at times - easily reorients    IV Access:  - None    Nursing Mobility/ADLs:  Walking   Assisted  Transfer  Assisted  Bathing  Assisted  Dressing  Assisted  Toileting  Assisted  Feeding  Assisted  Med Admin  Assisted  Med Delivery   whole    Wound Care Documentation and Therapy:  Incision 06/07/12 Groin Right (Active)   Number of days: 3478       Incision 04/30/17 Head Right (Active)   Number of days: 6160        Elimination:  Continence: Bowel: Yes  Bladder: No  Urinary Catheter: None   Colostomy/Ileostomy/Ileal Conduit: No       Date of Last BM: 12/13/21    Intake/Output Summary (Last 24 hours) at 12/15/2021 1446  Last data filed at 12/15/2021 0723  Gross per 24 hour   Intake 20 ml   Output 600 ml   Net -580 ml     I/O last 3 completed shifts: In: 10 [I.V.:10]  Out: 600 [Urine:600]    Safety Concerns: At Risk for Falls    Impairments/Disabilities:      Vision and Hearing    Nutrition Therapy:  Current Nutrition Therapy:   - Oral Diet:  General    Routes of Feeding: Oral  Liquids: Thin Liquids  Daily Fluid Restriction: no  Last Modified Barium Swallow with Video (Video Swallowing Test): not done    Treatments at the Time of Hospital Discharge:   Respiratory Treatments: ***  Oxygen Therapy:  is not on home oxygen therapy.   Ventilator:    - No ventilator support    Rehab Therapies: Physical Therapy and Occupational Therapy  Weight Bearing Status/Restrictions: No weight bearing restirctions  Other Medical Equipment (for information only, NOT a DME order):  walker and hospital bed  Other Treatments: ***    Patient's personal belongings (please select all that are sent with patient):  Glasses    RN SIGNATURE:  Electronically signed by Kimberly Sanon RN on 12/16/21 at 1:42 PM EST    CASE MANAGEMENT/SOCIAL WORK SECTION    Inpatient Status Date: ***    Readmission Risk Assessment Score:  Readmission Risk              Risk of Unplanned Readmission:  16           Discharging to Facility/ Agency   Name: [facility]  [address]  FANJM:[phone]  Fax:[phone]    Dialysis Facility (if applicable)   Name:  Address:  Dialysis Schedule:  Phone:  Fax:    / signature: Electronically signed by Eben Ganser, LSW on 12/15/21 at 2:46 PM EST    PHYSICIAN SECTION    Prognosis: {Prognosis:9638855032}    Condition at Discharge: Stable    Rehab Potential (if transferring to Rehab): {Prognosis:3012344954}    Recommended Labs or Other Treatments After Discharge: ***    Physician Certification: I certify the above information and transfer of Janneth Elam  is necessary for the continuing treatment of the diagnosis listed and that he requires [facility] for less 30 days.      Update Admission H&P: {CHP DME Changes in KKAOV:[id number]}    PHYSICIAN SIGNATURE:  Electronically signed by Dexter Bello

## 2021-12-15 NOTE — CARE COORDINATION
Social work / Discharge Planning:       Social work spoke to the patient's caregiver Flex Mata for initial assessment. She cares for the patient and his wife daily for several hours. The patient uses a cane and rollator at home and sometimes a wc. He has no history of HC or DANIELLE. Awaiting PT evaluation. Caregiver is requesting DANIELLE and wants [facility] or [facility]. Referral made to liaison who covers both facilities. Awaiting response.   Electronically signed by YASMANY Gallegos on 12/15/2021 at 10:08 AM

## 2021-12-15 NOTE — PROGRESS NOTES
Internal Medicine Progress Note    Patient's name: Jenna Carter  : 1928  Chief complaints (on day of admission): Hematuria (with clots since last night), Altered Mental Status (recent med changes), Back Pain, and Hypotension (82/58 here)  Admission date: 2021  Date of service: 12/15/2021   Room: [address] SURG  Primary care physician: Franne Moritz, MD  Reason for visit: Follow-up for UTI    Subjective  Meredith Jimenez was seen and examined at bedside. He was ambulating with a walker and no new complaints overnight. Patient's mental status has been improving, denies any abdominal pain at this time. Urine light yellow. Denies any other symptoms. Current treatment plan discussed and all questions answered including disposition plans. Current medications being prescribed discussed and patient expresses verbal understanding     Review of Systems  There are no new complaints of chest pain, shortness of breath, abdominal pain, nausea, vomiting, diarrhea, constipation unless otherwise mentioned above.      Hospital Medications  Current Facility-Administered Medications   Medication Dose Route Frequency Provider Last Rate Last Admin    cefTRIAXone (ROCEPHIN) 1 g injection             cefTRIAXone (ROCEPHIN) 1,000 mg in sterile water 10 mL IV syringe  1,000 mg IntraVENous Q24H Mamiekenneth Rico, DO 0 mL/hr at 21 0607 1,000 mg at 12/15/21 0234    bicalutamide (CASODEX) chemo tablet 50 mg  50 mg Oral Daily Faisal Toledo DO   50 mg at 21 1339    calcium elemental (OSCAL) tablet 500 mg  500 mg Oral Daily Faisal Toledo DO   500 mg at 12/15/21 0800    diclofenac sodium (VOLTAREN) 1 % gel 2 g  2 g Topical 4x Daily Faisal Toledo, DO   2 g at 21 203    apixaban (ELIQUIS) tablet 2.5 mg  2.5 mg Oral BID Faisal Toledo DO   2.5 mg at 12/15/21 1411    levothyroxine (SYNTHROID) tablet 100 mcg  100 mcg Oral Daily Faisal Toledo DO   100 mcg at 12/15/21 5942    magnesium gluconate (MAGONATE) tablet 500 mg  500 mg Oral BID Faisal E Volino, DO   500 mg at 12/14/21 2040    sodium chloride flush 0.9 % injection 10 mL  10 mL IntraVENous 2 times per day Faisal E Volino, DO   10 mL at 12/15/21 4218    sodium chloride flush 0.9 % injection 10 mL  10 mL IntraVENous PRN Faisal E Volino, DO        0.9 % sodium chloride infusion  25 mL IntraVENous PRN Faisal E Volino,  mL/hr at 12/14/21 0012 25 mL at 12/14/21 0012    potassium chloride (KLOR-CON M) extended release tablet 40 mEq  40 mEq Oral PRN Faisal E Volino, DO        Or    potassium bicarb-citric acid (EFFER-K) effervescent tablet 40 mEq  40 mEq Oral PRN Faisal E Volino, DO        Or    potassium chloride 10 mEq/100 mL IVPB (Peripheral Line)  10 mEq IntraVENous PRN Faisal E Volino, DO        ondansetron (ZOFRAN-ODT) disintegrating tablet 4 mg  4 mg Oral Q8H PRN Faisal E Volino, DO        Or    ondansetron (ZOFRAN) injection 4 mg  4 mg IntraVENous Q6H PRN Faisal E Volino, DO        senna (SENOKOT) tablet 8.6 mg  1 tablet Oral Daily PRN Faisal E Volino, DO        acetaminophen (TYLENOL) tablet 650 mg  650 mg Oral Q6H PRN Faisal E Volino, DO        Or    acetaminophen (TYLENOL) suppository 650 mg  650 mg Rectal Q6H PRN Faisal E Volino, DO        0.9 % sodium chloride infusion   IntraVENous Continuous Faisal E Volino, DO 75 mL/hr at 12/14/21 0004 New Bag at 12/14/21 0004       PRN Medications  sodium chloride flush, sodium chloride, potassium chloride **OR** potassium alternative oral replacement **OR** potassium chloride, ondansetron **OR** ondansetron, senna, acetaminophen **OR** acetaminophen    Objective  Most Recent Recorded Vitals  /84   Pulse 89   Temp 98.7 °F (37.1 °C) (Oral)   Resp 16   Ht 5' 9\" (1.753 m)   Wt 115 lb (52.2 kg)   SpO2 96%   BMI 16.98 kg/m²   I/O last 3 completed shifts:   In: 10 [I.V.:10]  Out: 600 [Urine:600]  I/O this shift:  In: 10 [I.V.:10]  Out: -     Physical Exam:  General: AAO to person/place/time/purpose, NAD, no labored breathing  Eyes: conjunctivae/corneas clear, sclera non icteric  Skin: color/texture/turgor normal, no rashes or lesions  Lungs: CTAB, no retractions/use of accessory muscles, no vocal fremitus, no rhonchi, no crackle, no rales  Heart: regular rate, regular rhythm, no murmur  Abdomen: soft, NT, bowel sounds normal  Extremities: atraumatic, no edema  Neurologic: cranial nerves 2-12 grossly intact, no slurred speech    Most Recent Labs  Lab Results   Component Value Date    WBC 4.4 (L) 12/15/2021    HGB 12.6 12/15/2021    HCT 38.8 12/15/2021     12/15/2021     12/15/2021    K 3.6 12/15/2021     12/15/2021    CREATININE 0.6 (L) 12/15/2021    BUN 10 12/15/2021    CO2 24 12/15/2021    GLUCOSE 90 12/15/2021    ALT 15 12/15/2021    AST 19 12/15/2021    INR 1.0 12/05/2019    TSH 3.260 12/02/2021       CT ABDOMEN PELVIS WO CONTRAST Additional Contrast? None   Final Result   1. No radiopaque renal or ureteral stone identified. No hydronephrosis. 2. Large hiatal hernia. No evidence of obstruction. RECOMMENDATIONS:   Unavailable         CT HEAD WO CONTRAST   Final Result   No acute intracranial abnormality. Cortical atrophy and periventricular leukomalacia. RECOMMENDATIONS:   Unavailable         XR CHEST PORTABLE   Final Result   1. Emphysematous changes. There are no findings of failure or pneumonia   2. Cardiomegaly   3. Very large hiatal hernia. Echocardiogram 10/2020  Summary   Indeterminate diastolic function. Ejection fraction is visually estimated at 60-65%. Normal left atrium. Mild centrally directed mitral regurgitation. AV area= 1.3 cm2   Moderate aortic stenosis. No pulmonary hypertension   No pericardial effusion.     Assessment   Active Hospital Problems    Diagnosis     History of DVT (deep vein thrombosis) [Z86.718]      Priority: High    Dehydration [E86.0]      Priority: Medium    UTI (urinary tract infection) [N39.0]     Arthritis [M19.90]     Prostate cancer ([facility] [address] [C61]     Chronic anticoagulation [Z79.01]     Hypertensive disorder [I10]     Hx of pulmonary embolus [Z86.711]     Carotid artery stenosis [I65.29]     [place] (hard of hearing) [H91.90]     History of prostate cancer [Z85.46]     Hypothyroidism [E03.9]          Plan  UTI  ? Continue Rocephin   ? Await cultures results - prelim no growth   ? Monitor for s/s urinary retention   ? Please bladder scan patient as needed   Physical deconditioning   Dementia   Decreased functional status   Hypothyroidism     · PT/OT  · DVT prophylaxis   · Code status Full   · Medications, labs and imaging reviewed   · Discharge plan: Plan for DC tomorrow to [facility]     The patient was seen and evaluated by myself and Dr. Alex Murphy MD PGY-1  12/15/2021  12:27 PM             Addendum: I have personally participated in a face-to-face history and physical exam on the date of service with the patient. I have discussed the case with the resident. I also participated in medical decision making with the resident on the date of service and I agree with all of the pertinent clinical information unless indicated in my editing of the note. I have reviewed and edited the note above based on my findings during my history, exam, and decision making on the same day of service. The vitals, labs, imaging, medications and treatment plan were reviewed independently by myself in addition to with the resident doctor. I agree with the above documentation and treatment plan     Electronically signed by Dub Hodgkin, MD on 12/15/2021 at 3:21 PM    I can be reached through [address] [place] [address].

## 2021-12-15 NOTE — PROGRESS NOTES
Physical Therapy    Facility/Department: [facility] MED SURG  Initial Assessment    NAME: Maddi Marie  : 1928  MRN: [id number]    Date of Service: 12/15/2021       Patient Diagnosis(es): The primary encounter diagnosis was Altered mental status, unspecified altered mental status type. Diagnoses of Acute cystitis with hematuria and Hypotension, unspecified hypotension type were also pertinent to this visit. has a past medical history of History of prostate cancer, History of spinal fracture, [place] (hard of hearing), Hypertension, Hypothyroidism, Leg cramps, Prostate cancer ([facility] [address] and Pulmonary emboli ([facility] [address] has a past surgical history that includes other surgical history (approx 8 yrs ago); Knee arthroscopy; Hand tendon surgery; Inguinal hernia repair (12); Colonoscopy (); Mastoid surgery; other surgical history (Right, 2017); and hernia repair (age 15). Referring provider:  Karla Trotter DO  PT Order:  PT eval and treat     Evaluating PT:  John Martinez PT, DPT PT [id number]    Room #:  7630/0842-R  Diagnosis:  UTI (urinary tract infection) [N39.0]  Acute cystitis with hematuria [N30.01]  Hypotension, unspecified hypotension type [I95.9]  Altered mental status, unspecified altered mental status type [R41.82]  Precautions:  Fall risk, decreased vision  Equipment Needs:  walker    SUBJECTIVE:    Pt lives with his wife in a 1.5 story home with 3 stairs to enter and 1 rail. Bed and bath is on first floor. Pt ambulated with SPC PTA. Pt with recent Kyphoplasty. PT reported there is a caregiver that assists his wife. OBJECTIVE:   Initial Evaluation  Date: 12/15 Treatment Short Term/ Long Term   Goals   Was pt agreeable to Eval/treatment? yes     Does pt have pain?  None reported     Bed Mobility  Rolling: Min A  Supine to sit: Min A  Sit to supine: Min A  Scooting: Min A to sitting EOB  SBA   Transfers Sit to stand: Min A  Stand to sit: Min A  Stand pivot: Min A with w/w  SBA   Ambulation 30 feet with w/w Min A   100+ feet with w/w SBA    Stair negotiation: ascended and descended  NT      ROM BLE:  WFL     Strength BLE:  Grossly 4-/5  Grossly 4/5   Balance Sitting EOB:  supervision  Dynamic Standing:  Min A with w/w  Sitting EOB:  independent  Dynamic Standing:  SBA with w/w   AM-PAC 6 Clicks 15/74       Pt is A & O x 3. Pt hard of hearing  Sensation:  Pt denies numbness and tingling to extremities      Patient education  Pt educated on PT objectives during eval and while in the hospital, hand placement during transfers, posture during ambulation. Patient response to education:   Pt verbalized understanding Pt demonstrated skill Pt requires further education in this area   yes With cueing yes     ASSESSMENT:    Conditions Requiring Skilled Therapeutic Intervention:    [x]Decreased strength     []Decreased ROM  [x]Decreased functional mobility  [x]Decreased balance   [x]Decreased endurance   [x]Decreased posture  []Decreased sensation  []Decreased coordination   [x]Decreased vision  []Decreased safety awareness   []Increased pain       Comments:  Pt found in bed. No report of dizziness during functional mobility. Cueing required for hand placement during transfers. Mildly unsteady gait but no LOB. At end of eval, pt left in bed with call light in reach and bed alarm on.      Pt's/ family goals   1. None stated    Prognosis is good for reaching above PT goals. Patient and or family understand(s) diagnosis, prognosis, and plan of care.   yes    PHYSICAL THERAPY PLAN OF CARE:    PT POC is established based on physician order and patient diagnosis     Diagnosis:  UTI (urinary tract infection) [N39.0]  Acute cystitis with hematuria [N30.01]  Hypotension, unspecified hypotension type [I95.9]  Altered mental status, unspecified altered mental status type [R41.82]    Current Treatment Recommendations:     [x] Strengthening to improve independence with functional mobility   [] ROM to improve independence with functional mobility   [x] Balance Training to improve static/dynamic balance and to reduce fall risk  [x] Endurance Training to improve activity tolerance during functional mobility   [x] Transfer Training to improve safety and independence with all functional transfers   [x] Gait Training to improve gait mechanics, endurance and assess need for appropriate assistive device  [] Stair Training in preparation for safe discharge home and/or into the community   [] Positioning to prevent skin breakdown and contractures  [x] Safety and Education Training   [x] Patient/Caregiver Education   [] HEP  [] Other     PT long term treatment goals are located in above grid    Frequency of treatments: 2-5x/week x 1-2 weeks. Time in  1058  Time out  1112    Evaluation Time includes thorough review of current medical information, gathering information on past medical history/social history and prior level of function, completion of standardized testing/informal observation of tasks, assessment of data and education on plan of care and goals.     CPT codes:  [x] Low Complexity PT evaluation 40249  [] Moderate Complexity PT evaluation 57104  [] High Complexity PT evaluation 56176  [] PT Re-evaluation 60215  [] Therapeutic activities 03123 __minutes  [] Therapeutic exercises 87429 __ minutes      Herber Mccarthy, PT, DPT  PT [id number]

## 2021-12-15 NOTE — PROGRESS NOTES
[facility] Quality Flow/Interdisciplinary Rounds Progress Note        Quality Flow Rounds held on December 15, 2021    Disciplines Attending:  Bedside Nurse, ,  and Nursing Unit [id number] Quintin Thomason was admitted on 12/13/2021  8:11 PM    Anticipated Discharge Date:  Expected Discharge Date: 01/28/22    Disposition:    Tyler Score:  Tyler Scale Score: 18    Readmission Risk              Risk of Unplanned Readmission:  16           Discussed patient goal for the day, patient clinical progression, and barriers to discharge.   The following Goal(s) of the Day/Commitment(s) have been identified:  Diagnostics - Report Results and Labs - Report Results, discharge planning      Kylie Fish RN  December 15, 2021

## 2021-12-16 LAB
ADENOVIRUS BY PCR: NOT DETECTED
ALBUMIN SERPL-MCNC: 3 G/DL (ref 3.5–5.2)
ALP BLD-CCNC: 110 U/L (ref 40–129)
ALT SERPL-CCNC: 13 U/L (ref 0–40)
ANION GAP SERPL CALCULATED.3IONS-SCNC: 9 MMOL/L (ref 7–16)
AST SERPL-CCNC: 18 U/L (ref 0–39)
BASOPHILS ABSOLUTE: 0.01 E9/L (ref 0–0.2)
BASOPHILS RELATIVE PERCENT: 0.2 % (ref 0–2)
BILIRUB SERPL-MCNC: 0.3 MG/DL (ref 0–1.2)
BORDETELLA PARAPERTUSSIS BY PCR: NOT DETECTED
BORDETELLA PERTUSSIS BY PCR: NOT DETECTED
BUN BLDV-MCNC: 16 MG/DL (ref 6–23)
CALCIUM SERPL-MCNC: 8.7 MG/DL (ref 8.6–10.2)
CHLAMYDOPHILIA PNEUMONIAE BY PCR: NOT DETECTED
CHLORIDE BLD-SCNC: 102 MMOL/L (ref 98–107)
CO2: 23 MMOL/L (ref 22–29)
CORONAVIRUS 229E BY PCR: NOT DETECTED
CORONAVIRUS HKU1 BY PCR: NOT DETECTED
CORONAVIRUS NL63 BY PCR: NOT DETECTED
CORONAVIRUS OC43 BY PCR: NOT DETECTED
CREAT SERPL-MCNC: 0.8 MG/DL (ref 0.7–1.2)
EOSINOPHILS ABSOLUTE: 0.05 E9/L (ref 0.05–0.5)
EOSINOPHILS RELATIVE PERCENT: 1.2 % (ref 0–6)
GFR AFRICAN AMERICAN: >60
GFR NON-AFRICAN AMERICAN: >60 ML/MIN/1.73
GLUCOSE BLD-MCNC: 96 MG/DL (ref 74–99)
HCT VFR BLD CALC: 36.5 % (ref 37–54)
HEMOGLOBIN: 11.5 G/DL (ref 12.5–16.5)
HUMAN METAPNEUMOVIRUS BY PCR: NOT DETECTED
HUMAN RHINOVIRUS/ENTEROVIRUS BY PCR: NOT DETECTED
IMMATURE GRANULOCYTES #: 0.01 E9/L
IMMATURE GRANULOCYTES %: 0.2 % (ref 0–5)
INFLUENZA A BY PCR: NOT DETECTED
INFLUENZA B BY PCR: NOT DETECTED
LYMPHOCYTES ABSOLUTE: 1.38 E9/L (ref 1.5–4)
LYMPHOCYTES RELATIVE PERCENT: 34.3 % (ref 20–42)
MCH RBC QN AUTO: 27.4 PG (ref 26–35)
MCHC RBC AUTO-ENTMCNC: 31.5 % (ref 32–34.5)
MCV RBC AUTO: 86.9 FL (ref 80–99.9)
MONOCYTES ABSOLUTE: 0.43 E9/L (ref 0.1–0.95)
MONOCYTES RELATIVE PERCENT: 10.7 % (ref 2–12)
MYCOPLASMA PNEUMONIAE BY PCR: NOT DETECTED
NEUTROPHILS ABSOLUTE: 2.14 E9/L (ref 1.8–7.3)
NEUTROPHILS RELATIVE PERCENT: 53.4 % (ref 43–80)
PARAINFLUENZA VIRUS 1 BY PCR: NOT DETECTED
PARAINFLUENZA VIRUS 2 BY PCR: NOT DETECTED
PARAINFLUENZA VIRUS 3 BY PCR: NOT DETECTED
PARAINFLUENZA VIRUS 4 BY PCR: NOT DETECTED
PDW BLD-RTO: 15 FL (ref 11.5–15)
PLATELET # BLD: 276 E9/L (ref 130–450)
PMV BLD AUTO: 9.4 FL (ref 7–12)
POTASSIUM REFLEX MAGNESIUM: 4.2 MMOL/L (ref 3.5–5)
RBC # BLD: 4.2 E12/L (ref 3.8–5.8)
RESPIRATORY SYNCYTIAL VIRUS BY PCR: NOT DETECTED
SARS-COV-2, NAAT: DETECTED
SARS-COV-2, PCR: DETECTED
SODIUM BLD-SCNC: 134 MMOL/L (ref 132–146)
TOTAL PROTEIN: 5.5 G/DL (ref 6.4–8.3)
URINE CULTURE, ROUTINE: NORMAL
WBC # BLD: 4 E9/L (ref 4.5–11.5)

## 2021-12-16 PROCEDURE — 85025 COMPLETE CBC W/AUTO DIFF WBC: CPT

## 2021-12-16 PROCEDURE — 96376 TX/PRO/DX INJ SAME DRUG ADON: CPT

## 2021-12-16 PROCEDURE — 83615 LACTATE (LD) (LDH) ENZYME: CPT

## 2021-12-16 PROCEDURE — 6370000000 HC RX 637 (ALT 250 FOR IP): Performed by: INTERNAL MEDICINE

## 2021-12-16 PROCEDURE — 6360000002 HC RX W HCPCS: Performed by: EMERGENCY MEDICINE

## 2021-12-16 PROCEDURE — 0202U NFCT DS 22 TRGT SARS-COV-2: CPT

## 2021-12-16 PROCEDURE — G0378 HOSPITAL OBSERVATION PER HR: HCPCS

## 2021-12-16 PROCEDURE — 36415 COLL VENOUS BLD VENIPUNCTURE: CPT

## 2021-12-16 PROCEDURE — 87635 SARS-COV-2 COVID-19 AMP PRB: CPT

## 2021-12-16 PROCEDURE — 2580000003 HC RX 258: Performed by: INTERNAL MEDICINE

## 2021-12-16 PROCEDURE — 2580000003 HC RX 258: Performed by: EMERGENCY MEDICINE

## 2021-12-16 PROCEDURE — 80053 COMPREHEN METABOLIC PANEL: CPT

## 2021-12-16 PROCEDURE — 84484 ASSAY OF TROPONIN QUANT: CPT

## 2021-12-16 PROCEDURE — 1200000000 HC SEMI PRIVATE

## 2021-12-16 RX ORDER — AMOXICILLIN AND CLAVULANATE POTASSIUM 875; 125 MG/1; MG/1
1 TABLET, FILM COATED ORAL 2 TIMES DAILY
Qty: 14 TABLET | Refills: 0 | DISCHARGE
Start: 2021-12-16 | End: 2021-12-17 | Stop reason: SDUPTHER

## 2021-12-16 RX ADMIN — LEVOTHYROXINE SODIUM 100 MCG: 100 TABLET ORAL at 05:46

## 2021-12-16 RX ADMIN — SODIUM CHLORIDE: 9 INJECTION, SOLUTION INTRAVENOUS at 01:30

## 2021-12-16 RX ADMIN — APIXABAN 2.5 MG: 5 TABLET, FILM COATED ORAL at 08:39

## 2021-12-16 RX ADMIN — DICLOFENAC SODIUM 2 G: 10 GEL TOPICAL at 22:54

## 2021-12-16 RX ADMIN — BICALUTAMIDE 50 MG: 50 TABLET ORAL at 08:39

## 2021-12-16 RX ADMIN — APIXABAN 2.5 MG: 5 TABLET, FILM COATED ORAL at 22:54

## 2021-12-16 RX ADMIN — CALCIUM 500 MG: 500 TABLET ORAL at 08:39

## 2021-12-16 RX ADMIN — SODIUM CHLORIDE, PRESERVATIVE FREE 1000 MG: 5 INJECTION INTRAVENOUS at 02:23

## 2021-12-16 RX ADMIN — Medication 500 MG: at 22:54

## 2021-12-16 RX ADMIN — DICLOFENAC SODIUM 2 G: 10 GEL TOPICAL at 08:39

## 2021-12-16 RX ADMIN — Medication 500 MG: at 08:39

## 2021-12-16 ASSESSMENT — PAIN SCALES - GENERAL
PAINLEVEL_OUTOF10: 0
PAINLEVEL_OUTOF10: 0

## 2021-12-16 NOTE — DISCHARGE SUMMARY
Internal Medicine Discharge Summary    NAME: Camila Weems :  1928  MRN:  [id number] Robbin Salcido MD    ADMITTED: 2021   DISCHARGED: No discharge date for patient encounter. ADMITTING PHYSICIAN: Jayla Martinez MD    PCP: Chaz Lopez MD    CONSULTANT(S):   IP CONSULT TO SOCIAL WORK     ADMITTING DIAGNOSIS:   UTI (urinary tract infection) [N39.0]  Acute cystitis with hematuria [N30.01]  Hypotension, unspecified hypotension type [I95.9]  Altered mental status, unspecified altered mental status type [R41.82]     Please see H&P for further details    DISCHARGE DIAGNOSES:   Active Hospital Problems    Diagnosis     History of DVT (deep vein thrombosis) [Z86.718]      Priority: High    Dehydration [E86.0]      Priority: Medium    UTI (urinary tract infection) [N39.0]     Arthritis [M19.90]     Prostate cancer ([address] [C61]     Chronic anticoagulation [Z79.01]     Hypertensive disorder [I10]     Hx of pulmonary embolus [Z86.711]     Carotid artery stenosis [I65.29]     [place] (hard of hearing) [H91.90]     History of prostate cancer [Z85.46]     Hypothyroidism [E03.9]        BRIEF HISTORY OF PRESENT ILLNESS: Camila Weems is a 80 y.o. male patient of Chaz Lopez MD who  has a past medical history of History of prostate cancer, History of spinal fracture, [place] (hard of hearing), Hypertension, Hypothyroidism, Leg cramps, Prostate cancer ([address] and Pulmonary emboli ([address] who originally had concerns including Hematuria (with clots since last night), Altered Mental Status (recent med changes), Back Pain, and Hypotension (82/58 here). at presentation on 2021, and was found to have UTI (urinary tract infection) [N39.0]  Acute cystitis with hematuria [N30.01]  Hypotension, unspecified hypotension type [I95.9]  Altered mental status, unspecified altered mental status type [R41.82] after workup. Please see H&P for further details.     HOSPITAL COURSE:   The patient presented to the hospital with the chief complaint of Hematuria (with clots since last night), Altered Mental Status (recent med changes), Back Pain, and Hypotension (82/58 here)  . The patient was admitted to the hospital.     UTI  ? Continue Rocephin   ? DC with Augmentin  ? Ucx no growth present   ? Monitor for s/s urinary retention   ? Please bladder scan patient as needed   Physical deconditioning   Dementia   Decreased functional status   Hypothyroidism     · PT/OT  · DVT prophylaxis   · Code status Full   · Medications, labs and imaging reviewed   Discharge plan: DC today       BRIEF PHYSICAL EXAMINATION AND LABORATORIES ON DAY OF DISCHARGE:  VITALS:  /62   Pulse 84   Temp 97.9 °F (36.6 °C) (Oral)   Resp 16   Ht 5' 9\" (1.753 m)   Wt 117 lb (53.1 kg)   SpO2 96%   BMI 17.28 kg/m²       Please see note from the same day.      LABS[de-identified]  Recent Labs     12/13/21  1534 12/15/21  0240 12/16/21  0306    138 134   K 4.3 3.6 4.2   CL 99 101 102   CO2 26 24 23   BUN 24* 10 16   CREATININE 0.8 0.6* 0.8   GLUCOSE 103* 90 96   CALCIUM 10.0 9.4 8.7     Recent Labs     12/13/21  1534 12/15/21  0240 12/16/21  0306   ALKPHOS 146* 128 110   ALT 23 15 13   AST 25 19 18   PROT 7.3 6.3* 5.5*   BILITOT 0.6 0.6 0.3   LABALBU 4.0 3.4* 3.0*     Recent Labs     12/13/21  1534 12/15/21  0240 12/16/21  0306   WBC 4.6 4.4* 4.0*   RBC 5.07 4.50 4.20   HGB 14.1 12.6 11.5*   HCT 43.9 38.8 36.5*   MCV 86.6 86.2 86.9   MCH 27.8 28.0 27.4   MCHC 32.1 32.5 31.5*   RDW 14.9 14.7 15.0    284 276   MPV 9.0 9.2 9.4     No results found for: LABA1C  Lab Results   Component Value Date    INR 1.0 12/05/2019    INR 1.0 04/30/2017    INR 1.1 01/29/2016    PROTIME 11.6 12/05/2019    PROTIME 10.8 04/30/2017    PROTIME 12.1 01/29/2016      Lab Results   Component Value Date    TSH 3.260 12/02/2021    TSH 2.440 08/13/2020    TSH 1.380 09/11/2019     Lab Results   Component Value Date    TRIG 73 02/10/2021    HDL 70 02/10/2021    LDLCALC 99 02/10/2021 No results for input(s): MG in the last 72 hours. No results for input(s): CKTOTAL, CKMB, TROPONINI in the last 72 hours. Recent Labs     12/13/21  1534   LACTA 1.4       IMAGING:  CT ABDOMEN PELVIS WO CONTRAST Additional Contrast? None    Result Date: 12/14/2021  EXAMINATION: CT OF THE ABDOMEN AND PELVIS WITHOUT CONTRAST 12/14/2021 3:14 am TECHNIQUE: CT of the abdomen and pelvis was performed without the administration of intravenous contrast. Multiplanar reformatted images are provided for review. Dose modulation, iterative reconstruction, and/or weight based adjustment of the mA/kV was utilized to reduce the radiation dose to as low as reasonably achievable. COMPARISON: 12/05/2019 HISTORY: ORDERING SYSTEM PROVIDED HISTORY: left flank pain, hematuria TECHNOLOGIST PROVIDED HISTORY: Reason for exam:->left flank pain, hematuria Additional Contrast?->None FINDINGS: Lower Chest: Bilateral dependent atelectasis. Mild fibrotic changes. Redemonstration large hiatal hernia with intrathoracic stomach and segments of small and large bowel. Organs: The liver, gallbladder, spleen, pancreas, and adrenals are unremarkable. Bilateral kidneys demonstrate mild perinephric stranding. No hydronephrosis. No radiopaque renal or ureteral stone is identified. GI/Bowel: There is diverticulosis without evidence of acute diverticulitis. No evidence of obstruction. Pelvis: Todd catheter within the urinary bladder. Intravesicular gas likely related to recent instrumentation. The prostate is not enlarged. Peritoneum/Retroperitoneum: There is no free fluid or extraluminal gas. Extensive atherosclerotic calcifications of the abdominal aorta and pelvic vessels. No abdominal aortic aneurysm noted. No pathologic lymphadenopathy. Bones/Soft Tissues: Diffuse decreased bone density. Status post interval L3 vertebroplasty. Redemonstration T11, L2, and L3 chronic compression deformities.   There is stable grade 1 anterolisthesis of L4 There are emphysematous changes. There are no findings of failure or pneumonia. 1. Emphysematous changes. There are no findings of failure or pneumonia 2. Cardiomegaly 3. Very large hiatal hernia. MICROBIOLOGY:  BLOOD CX #1  No results for input(s): BC in the last 72 hours. BLOOD CX #2  No results for input(s): Lorelle Kuster in the last 72 hours. TIP CULTURE  No results for input(s): CXCATHTIP in the last 72 hours. CULTURE, RESPIRATORY   No results for input(s): CULTRESP in the last 72 hours. RESPIRATORY SMEAR  No results for input(s): RESPSMEAR in the last 72 hours. ECHO:      DISPOSITION:  The patient's condition is good.    The patient is being discharged to nursing home    DISCHARGE MEDICATIONS:      Medication List      START taking these medications    amoxicillin-clavulanate 875-125 MG per tablet  Commonly known as: AUGMENTIN  Take 1 tablet by mouth 2 times daily for 7 days        CONTINUE taking these medications    acetaminophen 325 MG tablet  Commonly known as: TYLENOL     bicalutamide 50 MG chemo tablet  Commonly known as: CASODEX     calcitonin 200 UNIT/ACT nasal spray  Commonly known as: Miacalcin  1 spray by Nasal route daily     calcium carbonate 500 MG Tabs tablet  Commonly known as: OSCAL     diclofenac sodium 1 % Gel  Commonly known as: VOLTAREN  Apply 2 g topically 4 times daily     Eliquis 2.5 MG Tabs tablet  Generic drug: apixaban  TAKE 1 TABLET BY MOUTH TWICE DAILY     fish oil 1000 MG Caps     Handicap Placard Misc     leuprolide 45 MG injection  Commonly known as: LUPRON     levothyroxine 100 MCG tablet  Commonly known as: SYNTHROID  TAKE 1 TABLET BY MOUTH EVERY DAY     lisinopril 5 MG tablet  Commonly known as: PRINIVIL;ZESTRIL     magnesium gluconate 500 MG tablet  Commonly known as: MAGONATE     PRESERVISION AREDS PO     SUPER B COMPLEX PO     traMADol 50 MG tablet  Commonly known as: ULTRAM     turmeric 500 MG Caps     vitamin B-12 1000 MCG tablet  Commonly known as: CYANOCOBALAMIN           Where to Get Your Medications      Information about where to get these medications is not yet available    Ask your nurse or doctor about these medications  · amoxicillin-clavulanate 875-125 MG per tablet         Current Discharge Medication List        Current Discharge Medication List        Current Discharge Medication List      START taking these medications    Details   amoxicillin-clavulanate (AUGMENTIN) 875-125 MG per tablet Take 1 tablet by mouth 2 times daily for 7 days  Qty: 14 tablet, Refills: 0             INTERNAL MEDICINE FOLLOW UP/INSTRUCTIONS:  · Follow-up with primary care physician within 1 week of discharge from hospital  · Please review changes to pre-hospital admission medications and prescriptions for new medications upon discharge from the hospital with PCP  · Please review results of labs and imaging studies with PCP  · Follow-up with consultants as directed by them   · If recurrence or worsening of symptoms please call primary care physician or return to the ER immediately  · Diet: ADULT DIET; Regular    Preparing for this patient's discharge, including paperwork, orders, instructions, and meeting with patient did required >35 minutes.     Electronically signed by Hailey Bruner MD on 12/16/2021 at 2:46 PM

## 2021-12-16 NOTE — CARE COORDINATION
Patient had been scheduled for discharge to [facility] with transportation at 4 PM via [facility]. Nasal swab result returned positive for COVID. Transportation cancelled, facility was notified. Terrence Noriega, MSN, RN  [facility] Case Management  [phone]

## 2021-12-16 NOTE — PLAN OF CARE
Problem: Falls - Risk of:  Goal: Will remain free from falls  Description: Will remain free from falls  Outcome: Met This Shift  Goal: Absence of physical injury  Description: Absence of physical injury  Outcome: Met This Shift     Problem: Coping:  Goal: Ability to remain calm will improve  Description: Ability to remain calm will improve  Outcome: Met This Shift     Problem: Safety:  Goal: Ability to remain free from injury will improve  Description: Ability to remain free from injury will improve  Outcome: Met This Shift     Problem: Self-Care:  Goal: Ability to participate in self-care as condition permits will improve  Description: Ability to participate in self-care as condition permits will improve  Outcome: Ongoing     Problem: Pain:  Goal: Pain level will decrease  Description: Pain level will decrease  Outcome: Met This Shift  Goal: Control of acute pain  Description: Control of acute pain  Outcome: Met This Shift  Goal: Control of chronic pain  Description: Control of chronic pain  Outcome: Met This Shift     Problem: Skin Integrity:  Goal: Will show no infection signs and symptoms  Description: Will show no infection signs and symptoms  Outcome: Ongoing  Goal: Absence of new skin breakdown  Description: Absence of new skin breakdown  Outcome: Ongoing     Problem: Musculor/Skeletal Functional Status  Goal: Highest potential functional level  Outcome: Ongoing  Goal: Absence of falls  Outcome: Met This Shift

## 2021-12-16 NOTE — CARE COORDINATION
Social work / Discharge Planning:         Discharge plan is for [facility]. Updated therapy notes are on the chart. Will not need to wait for precert approval.    Patient will need a negative covid result day of discharge. [place], [id number] and ambulette form completed.     Electronically signed by YASMANY Tracy on 12/16/2021 at 9:34 AM

## 2021-12-16 NOTE — CARE COORDINATION
Social work / Discharge Planning:       Patient is now covid positive. He cannot admit to [facility]. Facility updated and referral cancelled. Social work spoke to the patient's caregiver Caroleen Bamberger and updated her. She prefers to take the patient home rather than consider [facility] that accepts covid positive patients. She has no preference for agency. Referral called to [facility].. The caregiver cannot take the patient home until tomorrow morning. Home care orders are needed prior to discharge.    Electronically signed by YASMANY Arevalo on 12/16/2021 at 3:23 PM

## 2021-12-16 NOTE — PROGRESS NOTES
Notified Dr. Adelaida Smith of covid positive result.   Electronically signed by Shivani De La Vega RN on 12/16/2021 at 3:13 PM

## 2021-12-16 NOTE — PLAN OF CARE
Problem: Falls - Risk of:  Goal: Will remain free from falls  Description: Will remain free from falls  12/16/2021 1004 by Ange La RN  Outcome: Met This Shift     Problem: Falls - Risk of:  Goal: Absence of physical injury  Description: Absence of physical injury  12/16/2021 1004 by Ange La RN  Outcome: Met This Shift     Problem: Coping:  Goal: Ability to remain calm will improve  Description: Ability to remain calm will improve  12/16/2021 1004 by Ange La RN  Outcome: Met This Shift     Problem: Safety:  Goal: Ability to remain free from injury will improve  Description: Ability to remain free from injury will improve  12/16/2021 1004 by Ange La RN  Outcome: Met This Shift     Problem: Self-Care:  Goal: Ability to participate in self-care as condition permits will improve  Description: Ability to participate in self-care as condition permits will improve  12/16/2021 1004 by Ange La RN  Outcome: Met This Shift     Problem: Pain:  Goal: Pain level will decrease  Description: Pain level will decrease  12/16/2021 1004 by Ange La RN  Outcome: Met This Shift     Problem: Pain:  Goal: Control of acute pain  Description: Control of acute pain  12/16/2021 1004 by Ange La RN  Outcome: Met This Shift     Problem: Pain:  Goal: Control of chronic pain  Description: Control of chronic pain  12/16/2021 1004 by Ange La RN  Outcome: Met This Shift     Problem: Skin Integrity:  Goal: Will show no infection signs and symptoms  Description: Will show no infection signs and symptoms  12/16/2021 1004 by Ange La RN  Outcome: Met This Shift     Problem: Skin Integrity:  Goal: Absence of new skin breakdown  Description: Absence of new skin breakdown  12/16/2021 1004 by Ange La RN  Outcome: Met This Shift     Problem: Musculor/Skeletal Functional Status  Goal: Highest potential functional level  12/16/2021 1004 by [facility] Dan[facility] RN  Outcome: Met This Shift     Problem: Musculor/Skeletal Functional Status  Goal: Absence of falls  12/16/2021 1004 by Tiarra Hurtado RN  Outcome: Met This Shift

## 2021-12-16 NOTE — PROGRESS NOTES
Internal Medicine Progress Note    Patient's name: Denise Min  : 1928  Chief complaints (on day of admission): Hematuria (with clots since last night), Altered Mental Status (recent med changes), Back Pain, and Hypotension (82/58 here)  Admission date: 2021  Date of service: 2021   Room: [address] SURG  Primary care physician: Chetna Glass MD  Reason for visit: Follow-up for UTI    Subjective  Shamika Gray was seen and examined at bedside. Doing very well today   Cognizant of medical situation   No new complaints feels good  Wants to be dc back to facility today   Discussed with nursing staff and CM     Review of Systems  There are no new complaints of chest pain, shortness of breath, abdominal pain, nausea, vomiting, diarrhea, constipation unless otherwise mentioned above.      Hospital Medications  Current Facility-Administered Medications   Medication Dose Route Frequency Provider Last Rate Last Admin    cefTRIAXone (ROCEPHIN) 1,000 mg in sodium chloride flush 10 mL IV syringe  1,000 mg IntraVENous Q24H Richard Bors, DO   1,000 mg at 21 0223    bicalutamide (CASODEX) chemo tablet 50 mg  50 mg Oral Daily Faisal Toledo, DO   50 mg at 21 0839    calcium elemental (OSCAL) tablet 500 mg  500 mg Oral Daily Faisal Toledo, DO   500 mg at 21 0839    diclofenac sodium (VOLTAREN) 1 % gel 2 g  2 g Topical 4x Daily Faisal Toledo, DO   2 g at 21 0839    apixaban (ELIQUIS) tablet 2.5 mg  2.5 mg Oral BID Faisal Toledo, DO   2.5 mg at 21 0839    levothyroxine (SYNTHROID) tablet 100 mcg  100 mcg Oral Daily Faisal Toledo, DO   100 mcg at 21 0546    magnesium gluconate (MAGONATE) tablet 500 mg  500 mg Oral BID Faisal Toledo, DO   500 mg at 21 0839    sodium chloride flush 0.9 % injection 10 mL  10 mL IntraVENous 2 times per day Faisal Toledo, DO   10 mL at 12/15/21 2020    sodium chloride flush 0.9 % injection 10 mL  10 mL IntraVENous PRN Faisal MANZANARES Volino, DO        0.9 % sodium chloride infusion  25 mL IntraVENous PRN Faisal E Volino,  mL/hr at 12/14/21 0012 25 mL at 12/14/21 0012    potassium chloride (KLOR-CON M) extended release tablet 40 mEq  40 mEq Oral PRN Faisal E Volino, DO        Or    potassium bicarb-citric acid (EFFER-K) effervescent tablet 40 mEq  40 mEq Oral PRN Faisal E Volino, DO        Or    potassium chloride 10 mEq/100 mL IVPB (Peripheral Line)  10 mEq IntraVENous PRN Faisal E Volino, DO        ondansetron (ZOFRAN-ODT) disintegrating tablet 4 mg  4 mg Oral Q8H PRN Faisal E Volino, DO        Or    ondansetron (ZOFRAN) injection 4 mg  4 mg IntraVENous Q6H PRN Faisal E Volino, DO        senna (SENOKOT) tablet 8.6 mg  1 tablet Oral Daily PRN Faisal E Volino, DO        acetaminophen (TYLENOL) tablet 650 mg  650 mg Oral Q6H PRN Faisal E Volino, DO        Or    acetaminophen (TYLENOL) suppository 650 mg  650 mg Rectal Q6H PRN Faisal E Volino, DO        0.9 % sodium chloride infusion   IntraVENous Continuous Faisal E Volino, DO 75 mL/hr at 12/16/21 0130 New Bag at 12/16/21 0130       PRN Medications  sodium chloride flush, sodium chloride, potassium chloride **OR** potassium alternative oral replacement **OR** potassium chloride, ondansetron **OR** ondansetron, senna, acetaminophen **OR** acetaminophen    Objective  Most Recent Recorded Vitals  /62   Pulse 84   Temp 97.9 °F (36.6 °C) (Oral)   Resp 16   Ht 5' 9\" (1.753 m)   Wt 117 lb (53.1 kg)   SpO2 96%   BMI 17.28 kg/m²   I/O last 3 completed shifts:   In: 2318.9 [I.V.:2318.9]  Out: 1950 [WBTPW:5658]  I/O this shift:  In: -   Out: 800 [Urine:800]    Physical Exam:  General: AAO to person/place/time/purpose, NAD, no labored breathing  Eyes: conjunctivae/corneas clear, sclera non icteric  Skin: color/texture/turgor normal, no rashes or lesions  Lungs: CTAB, no retractions/use of accessory muscles, no vocal fremitus, no rhonchi, no crackle, no rales  Heart: regular rate, regular rhythm, no murmur  Abdomen: soft, NT, bowel sounds normal  Extremities: atraumatic, no edema  Neurologic: cranial nerves 2-12 grossly intact, no slurred speech    Most Recent Labs  Lab Results   Component Value Date    WBC 4.0 (L) 12/16/2021    HGB 11.5 (L) 12/16/2021    HCT 36.5 (L) 12/16/2021     12/16/2021     12/16/2021    K 4.2 12/16/2021     12/16/2021    CREATININE 0.8 12/16/2021    BUN 16 12/16/2021    CO2 23 12/16/2021    GLUCOSE 96 12/16/2021    ALT 13 12/16/2021    AST 18 12/16/2021    INR 1.0 12/05/2019    TSH 3.260 12/02/2021       CT ABDOMEN PELVIS WO CONTRAST Additional Contrast? None   Final Result   1. No radiopaque renal or ureteral stone identified. No hydronephrosis. 2. Large hiatal hernia. No evidence of obstruction. RECOMMENDATIONS:   Unavailable         CT HEAD WO CONTRAST   Final Result   No acute intracranial abnormality. Cortical atrophy and periventricular leukomalacia. RECOMMENDATIONS:   Unavailable         XR CHEST PORTABLE   Final Result   1. Emphysematous changes. There are no findings of failure or pneumonia   2. Cardiomegaly   3. Very large hiatal hernia. Echocardiogram 10/2020  Summary   Indeterminate diastolic function. Ejection fraction is visually estimated at 60-65%. Normal left atrium. Mild centrally directed mitral regurgitation. AV area= 1.3 cm2   Moderate aortic stenosis. No pulmonary hypertension   No pericardial effusion.     Assessment   Active Hospital Problems    Diagnosis     History of DVT (deep vein thrombosis) [Z86.718]      Priority: High    Dehydration [E86.0]      Priority: Medium    UTI (urinary tract infection) [N39.0]     Arthritis [M19.90]     Prostate cancer ([facility] [address] [C61]     Chronic anticoagulation [Z79.01]     Hypertensive disorder [I10]     Hx of pulmonary embolus [Z86.711]     Carotid artery stenosis [I65.29]     [place] (hard of hearing) [H91.90]     History of prostate cancer [Z85.46]  Hypothyroidism [E03.9]          Plan  UTI  ? Continue Rocephin   ? DC with Augmentin  ? Ucx no growth present   ? Monitor for s/s urinary retention   ? Please bladder scan patient as needed   Physical deconditioning   Dementia   Decreased functional status   Hypothyroidism     · PT/OT  · DVT prophylaxis   · Code status Full   · Medications, labs and imaging reviewed   · Discharge plan: DC today     Electronically signed by Jey Mason MD on 12/16/2021 at 2:44 PM    I can be reached through [facility].

## 2021-12-16 NOTE — PATIENT CARE CONFERENCE
[facility] Quality Flow/Interdisciplinary Rounds Progress Note        Quality Flow Rounds held on December 16, 2021    Disciplines Attending:  Bedside Nurse, ,  and Nursing Unit [id number] Quintin Thomason was admitted on 12/13/2021  8:11 PM    Anticipated Discharge Date:  Expected Discharge Date: 01/28/22    Disposition:    Tyler Score:  Tyler Scale Score: 20    Readmission Risk              Risk of Unplanned Readmission:  18           Discussed patient goal for the day, patient clinical progression, and barriers to discharge.   The following Goal(s) of the Day/Commitment(s) have been identified:  Discharge - Obtain Order and Diagnostics - Report Results      Shara Zurita RN  December 16, 2021

## 2021-12-17 ENCOUNTER — TELEPHONE (OUTPATIENT)
Dept: FAMILY MEDICINE CLINIC | Age: 86
End: 2021-12-17

## 2021-12-17 VITALS
SYSTOLIC BLOOD PRESSURE: 98 MMHG | HEIGHT: 69 IN | RESPIRATION RATE: 16 BRPM | BODY MASS INDEX: 17.18 KG/M2 | WEIGHT: 116 LBS | OXYGEN SATURATION: 95 % | TEMPERATURE: 97.9 F | DIASTOLIC BLOOD PRESSURE: 57 MMHG | HEART RATE: 78 BPM

## 2021-12-17 LAB
ALBUMIN SERPL-MCNC: 3.6 G/DL (ref 3.5–5.2)
ALP BLD-CCNC: 131 U/L (ref 40–129)
ALT SERPL-CCNC: 19 U/L (ref 0–40)
ANION GAP SERPL CALCULATED.3IONS-SCNC: 10 MMOL/L (ref 7–16)
APTT: 31.7 SEC (ref 24.5–35.1)
AST SERPL-CCNC: 23 U/L (ref 0–39)
BASOPHILS ABSOLUTE: 0.01 E9/L (ref 0–0.2)
BASOPHILS RELATIVE PERCENT: 0.2 % (ref 0–2)
BILIRUB SERPL-MCNC: 0.4 MG/DL (ref 0–1.2)
BUN BLDV-MCNC: 13 MG/DL (ref 6–23)
CALCIUM SERPL-MCNC: 9.2 MG/DL (ref 8.6–10.2)
CHLORIDE BLD-SCNC: 103 MMOL/L (ref 98–107)
CO2: 25 MMOL/L (ref 22–29)
CREAT SERPL-MCNC: 0.7 MG/DL (ref 0.7–1.2)
D DIMER: 424 NG/ML DDU
EOSINOPHILS ABSOLUTE: 0.05 E9/L (ref 0.05–0.5)
EOSINOPHILS RELATIVE PERCENT: 1 % (ref 0–6)
FERRITIN: 591 NG/ML
FIBRINOGEN: 653 MG/DL (ref 225–540)
GFR AFRICAN AMERICAN: >60
GFR NON-AFRICAN AMERICAN: >60 ML/MIN/1.73
GLUCOSE BLD-MCNC: 98 MG/DL (ref 74–99)
HCT VFR BLD CALC: 42.4 % (ref 37–54)
HEMOGLOBIN: 13.3 G/DL (ref 12.5–16.5)
IMMATURE GRANULOCYTES #: 0.01 E9/L
IMMATURE GRANULOCYTES %: 0.2 % (ref 0–5)
INR BLD: 1.1
LACTATE DEHYDROGENASE: 208 U/L (ref 135–225)
LYMPHOCYTES ABSOLUTE: 1.55 E9/L (ref 1.5–4)
LYMPHOCYTES RELATIVE PERCENT: 31.8 % (ref 20–42)
MCH RBC QN AUTO: 27.3 PG (ref 26–35)
MCHC RBC AUTO-ENTMCNC: 31.4 % (ref 32–34.5)
MCV RBC AUTO: 86.9 FL (ref 80–99.9)
MONOCYTES ABSOLUTE: 0.44 E9/L (ref 0.1–0.95)
MONOCYTES RELATIVE PERCENT: 9 % (ref 2–12)
NEUTROPHILS ABSOLUTE: 2.81 E9/L (ref 1.8–7.3)
NEUTROPHILS RELATIVE PERCENT: 57.8 % (ref 43–80)
PDW BLD-RTO: 15 FL (ref 11.5–15)
PLATELET # BLD: 324 E9/L (ref 130–450)
PMV BLD AUTO: 9.4 FL (ref 7–12)
POTASSIUM REFLEX MAGNESIUM: 4.3 MMOL/L (ref 3.5–5)
PROCALCITONIN: 0.06 NG/ML (ref 0–0.08)
PROTHROMBIN TIME: 12.3 SEC (ref 9.3–12.4)
RBC # BLD: 4.88 E12/L (ref 3.8–5.8)
SODIUM BLD-SCNC: 138 MMOL/L (ref 132–146)
TOTAL PROTEIN: 6.7 G/DL (ref 6.4–8.3)
TROPONIN, HIGH SENSITIVITY: 25 NG/L (ref 0–11)
VITAMIN D 25-HYDROXY: 52 NG/ML (ref 30–100)
WBC # BLD: 4.9 E9/L (ref 4.5–11.5)

## 2021-12-17 PROCEDURE — 2580000003 HC RX 258: Performed by: INTERNAL MEDICINE

## 2021-12-17 PROCEDURE — 80053 COMPREHEN METABOLIC PANEL: CPT

## 2021-12-17 PROCEDURE — 85378 FIBRIN DEGRADE SEMIQUANT: CPT

## 2021-12-17 PROCEDURE — 85610 PROTHROMBIN TIME: CPT

## 2021-12-17 PROCEDURE — 85025 COMPLETE CBC W/AUTO DIFF WBC: CPT

## 2021-12-17 PROCEDURE — 36415 COLL VENOUS BLD VENIPUNCTURE: CPT

## 2021-12-17 PROCEDURE — 6370000000 HC RX 637 (ALT 250 FOR IP): Performed by: INTERNAL MEDICINE

## 2021-12-17 PROCEDURE — G0378 HOSPITAL OBSERVATION PER HR: HCPCS

## 2021-12-17 PROCEDURE — 84145 PROCALCITONIN (PCT): CPT

## 2021-12-17 PROCEDURE — 85730 THROMBOPLASTIN TIME PARTIAL: CPT

## 2021-12-17 PROCEDURE — 82728 ASSAY OF FERRITIN: CPT

## 2021-12-17 PROCEDURE — 96376 TX/PRO/DX INJ SAME DRUG ADON: CPT

## 2021-12-17 PROCEDURE — 85384 FIBRINOGEN ACTIVITY: CPT

## 2021-12-17 PROCEDURE — 2580000003 HC RX 258: Performed by: EMERGENCY MEDICINE

## 2021-12-17 PROCEDURE — 6360000002 HC RX W HCPCS: Performed by: EMERGENCY MEDICINE

## 2021-12-17 PROCEDURE — 82306 VITAMIN D 25 HYDROXY: CPT

## 2021-12-17 RX ORDER — AMOXICILLIN AND CLAVULANATE POTASSIUM 875; 125 MG/1; MG/1
1 TABLET, FILM COATED ORAL 2 TIMES DAILY
Qty: 14 TABLET | Refills: 0 | Status: SHIPPED | OUTPATIENT
Start: 2021-12-17 | End: 2021-12-24

## 2021-12-17 RX ORDER — ZINC SULFATE 50(220)MG
50 CAPSULE ORAL DAILY
Status: DISCONTINUED | OUTPATIENT
Start: 2021-12-17 | End: 2021-12-17 | Stop reason: HOSPADM

## 2021-12-17 RX ORDER — GUAIFENESIN/DEXTROMETHORPHAN 100-10MG/5
5 SYRUP ORAL EVERY 4 HOURS PRN
Qty: 120 ML | Refills: 0 | Status: SHIPPED | OUTPATIENT
Start: 2021-12-17 | End: 2021-12-27

## 2021-12-17 RX ORDER — AMOXICILLIN AND CLAVULANATE POTASSIUM 875; 125 MG/1; MG/1
1 TABLET, FILM COATED ORAL 2 TIMES DAILY
Qty: 14 TABLET | Refills: 0
Start: 2021-12-17 | End: 2021-12-17 | Stop reason: SDUPTHER

## 2021-12-17 RX ORDER — LANOLIN ALCOHOL/MO/W.PET/CERES
100 CREAM (GRAM) TOPICAL DAILY
Status: DISCONTINUED | OUTPATIENT
Start: 2021-12-17 | End: 2021-12-17 | Stop reason: HOSPADM

## 2021-12-17 RX ORDER — ASCORBIC ACID 500 MG
500 TABLET ORAL DAILY
Qty: 30 TABLET | Refills: 0 | Status: SHIPPED | OUTPATIENT
Start: 2021-12-18 | End: 2022-08-02

## 2021-12-17 RX ORDER — ZINC SULFATE 50(220)MG
50 CAPSULE ORAL DAILY
Qty: 30 CAPSULE | Refills: 0 | Status: SHIPPED | OUTPATIENT
Start: 2021-12-17 | End: 2022-05-04

## 2021-12-17 RX ORDER — CHOLECALCIFEROL (VITAMIN D3) 50 MCG
2000 TABLET ORAL DAILY
Qty: 30 TABLET | Refills: 0 | Status: SHIPPED | OUTPATIENT
Start: 2021-12-18 | End: 2022-08-02

## 2021-12-17 RX ORDER — ASCORBIC ACID 500 MG
500 TABLET ORAL DAILY
Status: DISCONTINUED | OUTPATIENT
Start: 2021-12-17 | End: 2021-12-17 | Stop reason: HOSPADM

## 2021-12-17 RX ORDER — AMOXICILLIN AND CLAVULANATE POTASSIUM 875; 125 MG/1; MG/1
1 TABLET, FILM COATED ORAL 2 TIMES DAILY
Qty: 14 TABLET | Refills: 0 | DISCHARGE
Start: 2021-12-17 | End: 2021-12-17 | Stop reason: SDUPTHER

## 2021-12-17 RX ORDER — LANOLIN ALCOHOL/MO/W.PET/CERES
100 CREAM (GRAM) TOPICAL DAILY
Qty: 30 TABLET | Refills: 0 | Status: SHIPPED | OUTPATIENT
Start: 2021-12-18 | End: 2022-05-04

## 2021-12-17 RX ORDER — GUAIFENESIN/DEXTROMETHORPHAN 100-10MG/5
5 SYRUP ORAL EVERY 4 HOURS PRN
Status: DISCONTINUED | OUTPATIENT
Start: 2021-12-17 | End: 2021-12-17 | Stop reason: HOSPADM

## 2021-12-17 RX ORDER — VITAMIN B COMPLEX
2000 TABLET ORAL DAILY
Status: DISCONTINUED | OUTPATIENT
Start: 2021-12-17 | End: 2021-12-17 | Stop reason: HOSPADM

## 2021-12-17 RX ADMIN — Medication 500 MG: at 10:10

## 2021-12-17 RX ADMIN — CALCIUM 500 MG: 500 TABLET ORAL at 10:10

## 2021-12-17 RX ADMIN — DICLOFENAC SODIUM 2 G: 10 GEL TOPICAL at 10:09

## 2021-12-17 RX ADMIN — SODIUM CHLORIDE, PRESERVATIVE FREE 1000 MG: 5 INJECTION INTRAVENOUS at 03:30

## 2021-12-17 RX ADMIN — Medication 100 MG: at 10:10

## 2021-12-17 RX ADMIN — APIXABAN 2.5 MG: 5 TABLET, FILM COATED ORAL at 10:10

## 2021-12-17 RX ADMIN — OXYCODONE HYDROCHLORIDE AND ACETAMINOPHEN 500 MG: 500 TABLET ORAL at 10:09

## 2021-12-17 RX ADMIN — BICALUTAMIDE 50 MG: 50 TABLET ORAL at 10:10

## 2021-12-17 RX ADMIN — SODIUM CHLORIDE: 9 INJECTION, SOLUTION INTRAVENOUS at 04:32

## 2021-12-17 RX ADMIN — DICLOFENAC SODIUM 2 G: 10 GEL TOPICAL at 16:08

## 2021-12-17 RX ADMIN — Medication 2000 UNITS: at 10:10

## 2021-12-17 RX ADMIN — LEVOTHYROXINE SODIUM 100 MCG: 100 TABLET ORAL at 06:46

## 2021-12-17 RX ADMIN — Medication 50 MG: at 10:10

## 2021-12-17 ASSESSMENT — PAIN SCALES - GENERAL
PAINLEVEL_OUTOF10: 0
PAINLEVEL_OUTOF10: 0

## 2021-12-17 NOTE — DISCHARGE SUMMARY
1.380 09/11/2019     Lab Results   Component Value Date    TRIG 73 02/10/2021    HDL 70 02/10/2021    LDLCALC 99 02/10/2021     No results for input(s): MG in the last 72 hours. No results for input(s): CKTOTAL, CKMB, TROPONINI in the last 72 hours. No results for input(s): LACTA in the last 72 hours. IMAGING:  CT ABDOMEN PELVIS WO CONTRAST Additional Contrast? None    Result Date: 12/14/2021  EXAMINATION: CT OF THE ABDOMEN AND PELVIS WITHOUT CONTRAST 12/14/2021 3:14 am TECHNIQUE: CT of the abdomen and pelvis was performed without the administration of intravenous contrast. Multiplanar reformatted images are provided for review. Dose modulation, iterative reconstruction, and/or weight based adjustment of the mA/kV was utilized to reduce the radiation dose to as low as reasonably achievable. COMPARISON: 12/05/2019 HISTORY: ORDERING SYSTEM PROVIDED HISTORY: left flank pain, hematuria TECHNOLOGIST PROVIDED HISTORY: Reason for exam:->left flank pain, hematuria Additional Contrast?->None FINDINGS: Lower Chest: Bilateral dependent atelectasis. Mild fibrotic changes. Redemonstration large hiatal hernia with intrathoracic stomach and segments of small and large bowel. Organs: The liver, gallbladder, spleen, pancreas, and adrenals are unremarkable. Bilateral kidneys demonstrate mild perinephric stranding. No hydronephrosis. No radiopaque renal or ureteral stone is identified. GI/Bowel: There is diverticulosis without evidence of acute diverticulitis. No evidence of obstruction. Pelvis: Todd catheter within the urinary bladder. Intravesicular gas likely related to recent instrumentation. The prostate is not enlarged. Peritoneum/Retroperitoneum: There is no free fluid or extraluminal gas. Extensive atherosclerotic calcifications of the abdominal aorta and pelvic vessels. No abdominal aortic aneurysm noted. No pathologic lymphadenopathy. Bones/Soft Tissues: Diffuse decreased bone density.   Status post interval L3 vertebroplasty. Redemonstration T11, L2, and L3 chronic compression deformities. There is stable grade 1 anterolisthesis of L4 on L5. Multilevel degenerative disc disease. 1. No radiopaque renal or ureteral stone identified. No hydronephrosis. 2. Large hiatal hernia. No evidence of obstruction. RECOMMENDATIONS: Unavailable     CT HEAD WO CONTRAST    Result Date: 12/13/2021  EXAMINATION: CT OF THE HEAD WITHOUT CONTRAST  12/13/2021 4:35 pm TECHNIQUE: CT of the head was performed without the administration of intravenous contrast. Dose modulation, iterative reconstruction, and/or weight based adjustment of the mA/kV was utilized to reduce the radiation dose to as low as reasonably achievable. COMPARISON: December 5, 2019 HISTORY: ORDERING SYSTEM PROVIDED HISTORY: [facility] TECHNOLOGIST PROVIDED HISTORY: Has a \"code stroke\" or \"stroke alert\" been called? ->No Reason for exam:->[facility] Decision Support Exception - unselect if not a suspected or confirmed emergency medical condition->Emergency Medical Condition (MA) FINDINGS: BRAIN/VENTRICLES: There are age related cortical atrophy and periventricular white matter ischemic changes. There is no acute intracranial hemorrhage, mass effect or midline shift. No abnormal extra-axial fluid collection. The gray-white differentiation is maintained without evidence of an acute infarct. There is no evidence of hydrocephalus. ORBITS: The visualized portion of the orbits demonstrate no acute abnormality. SINUSES: The visualized paranasal sinuses and mastoid air cells demonstrate no acute abnormality. SOFT TISSUES/SKULL:  No acute abnormality of the visualized skull or soft tissues. No acute intracranial abnormality. Cortical atrophy and periventricular leukomalacia.  RECOMMENDATIONS: Unavailable     XR CHEST PORTABLE    Result Date: 12/13/2021  EXAMINATION: ONE XRAY VIEW OF THE CHEST 12/13/2021 12:51 pm COMPARISON: 11/10/2021 HISTORY: ORDERING SYSTEM PROVIDED HISTORY: [facility] TECHNOLOGIST PROVIDED HISTORY: Reason for exam:->ams FINDINGS: The cardiac silhouette is enlarged. There is a very large hiatal hernia. There are emphysematous changes. There are no findings of failure or pneumonia. 1. Emphysematous changes. There are no findings of failure or pneumonia 2. Cardiomegaly 3. Very large hiatal hernia. DISPOSITION:  The patient's condition is fair.    The patient is being discharged to home    DISCHARGE MEDICATIONS:      Medication List      START taking these medications    amoxicillin-clavulanate 875-125 MG per tablet  Commonly known as: AUGMENTIN  Take 1 tablet by mouth 2 times daily for 7 days     ascorbic acid 500 MG tablet  Commonly known as: VITAMIN C  Take 1 tablet by mouth daily     guaiFENesin-dextromethorphan 100-10 MG/5ML syrup  Commonly known as: ROBITUSSIN DM  Take 5 mLs by mouth every 4 hours as needed for Cough     thiamine 100 MG tablet  Take 1 tablet by mouth daily     vitamin D 50 MCG (2000 UT) Tabs tablet  Commonly known as: CHOLECALCIFEROL  Take 1 tablet by mouth daily     zinc sulfate 220 (50 Zn) MG capsule  Commonly known as: ZINCATE  Take 1 capsule by mouth daily        CONTINUE taking these medications    acetaminophen 325 MG tablet  Commonly known as: TYLENOL     bicalutamide 50 MG chemo tablet  Commonly known as: CASODEX     calcitonin 200 UNIT/ACT nasal spray  Commonly known as: Miacalcin  1 spray by Nasal route daily     calcium carbonate 500 MG Tabs tablet  Commonly known as: OSCAL     diclofenac sodium 1 % Gel  Commonly known as: VOLTAREN  Apply 2 g topically 4 times daily     Eliquis 2.5 MG Tabs tablet  Generic drug: apixaban  TAKE 1 TABLET BY MOUTH TWICE DAILY     fish oil 1000 MG Caps     Handicap Placard Misc     leuprolide 45 MG injection  Commonly known as: LUPRON     levothyroxine 100 MCG tablet  Commonly known as: SYNTHROID  TAKE 1 TABLET BY MOUTH EVERY DAY     lisinopril 5 MG tablet  Commonly known as: PRINIVIL;ZESTRIL     magnesium gluconate 500 MG tablet  Commonly known as: MAGONATE     PRESERVISION AREDS PO     SUPER B COMPLEX PO     traMADol 50 MG tablet  Commonly known as: ULTRAM     turmeric 500 MG Caps     vitamin B-12 1000 MCG tablet  Commonly known as: CYANOCOBALAMIN           Where to Get Your Medications      These medications were sent to [address] [place] [address] [phone]  [place] [place], [address] [address]    Phone: [phone]   · ascorbic acid 500 MG tablet  · guaiFENesin-dextromethorphan 100-10 MG/5ML syrup  · thiamine 100 MG tablet  · vitamin D 50 MCG (2000 UT) Tabs tablet  · zinc sulfate 220 (50 Zn) MG capsule     These medications were sent to [place] [address], [address] [address]    Phone: [phone]   · amoxicillin-clavulanate 875-125 MG per tablet          INTERNAL MEDICINE INSTRUCTIONS:  · Follow-up with primary care physician as directed in discharge paperwork. · Take your oxygen saturation release every 6 hours at home while awake and call your PCP if he goes below 90%  · Please review results of imaging studies with PCP. · If recurrence or worsening of symptoms go to the ED or call primary care physician. · Diet: ADULT DIET; Regular    Derek Palma MD  [address] [address] [phone]    Schedule an appointment as soon as possible for a visit in 2 weeks      [address] , [address] [place] [address]  [phone]          Preparing for this patient's discharge, including paperwork, orders, instructions, and meeting with patient did required 34 minutes.     Electronically signed by Kelin De León DO on 12/18/2021 at 9:13 AM

## 2021-12-17 NOTE — PROGRESS NOTES
Dr. Anastasia Parra requesting monoclonal antibody infusion. Would like to be done outpatient today after discharge. Appropriate paperwork filled out and faxed to [phone] at this time. Confirmation fax went through.  Electronically signed by Judy Spencer RN on 12/17/2021 at 10:34 AM

## 2021-12-17 NOTE — CARE COORDINATION
Social work / Discharge Planning:        [facility]. Discharge plan is home with caregiver. [facility]  has accepted. Patient will need home care orders prior to discharge. His caregiver will transport home.   Electronically signed by YASMANY Gallegos on 12/17/2021 at 10:02 AM

## 2021-12-17 NOTE — PROGRESS NOTES
IntraVENous 2 times per day Faisal E Volino, DO   10 mL at 12/15/21 2020    sodium chloride flush 0.9 % injection 10 mL  10 mL IntraVENous PRN Faisal E Volino, DO        0.9 % sodium chloride infusion  25 mL IntraVENous PRN Faisal E Volino,  mL/hr at 12/14/21 0012 25 mL at 12/14/21 0012    potassium chloride (KLOR-CON M) extended release tablet 40 mEq  40 mEq Oral PRN Faisal E Volino, DO        Or    potassium bicarb-citric acid (EFFER-K) effervescent tablet 40 mEq  40 mEq Oral PRN Faisal E Volino, DO        Or    potassium chloride 10 mEq/100 mL IVPB (Peripheral Line)  10 mEq IntraVENous PRN Faisal E Volino, DO        ondansetron (ZOFRAN-ODT) disintegrating tablet 4 mg  4 mg Oral Q8H PRN Faisal E Volino, DO        Or    ondansetron (ZOFRAN) injection 4 mg  4 mg IntraVENous Q6H PRN Faisal E Volino, DO        senna (SENOKOT) tablet 8.6 mg  1 tablet Oral Daily PRN Faisal E Volino, DO        acetaminophen (TYLENOL) tablet 650 mg  650 mg Oral Q6H PRN Faisal E Volino, DO        Or    acetaminophen (TYLENOL) suppository 650 mg  650 mg Rectal Q6H PRN Faisal E Volino, DO        0.9 % sodium chloride infusion   IntraVENous Continuous Faisal E Volino, DO 75 mL/hr at 12/17/21 0432 New Bag at 12/17/21 0432       PRN Medications  sodium chloride flush, sodium chloride, potassium chloride **OR** potassium alternative oral replacement **OR** potassium chloride, ondansetron **OR** ondansetron, senna, acetaminophen **OR** acetaminophen    Objective  Most Recent Recorded Vitals  /73   Pulse 71   Temp 98.6 °F (37 °C) (Oral)   Resp 16   Ht 5' 9\" (1.753 m)   Wt 116 lb (52.6 kg)   SpO2 96%   BMI 17.13 kg/m²   I/O last 3 completed shifts: In: 1865.7 [P.O.:120; I.V.:853; IV Piggyback:892.7]  Out: 2500 [Urine:2500]  No intake/output data recorded.     Physical Exam:   General: AAO to person/place/time/purpose, NAD, no labored breathing, on RA  Eyes: conjunctivae/corneas clear, sclera non icteric  Skin: color/texture/turgor normal, no rashes or lesions  Lungs: CTAB, no retractions/use of accessory muscles, no vocal fremitus, no rhonchi, no crackle, no rales  Heart: regular rate, regular rhythm, no murmur  Abdomen: soft, NT, bowel sounds normal  Extremities: atraumatic, no edema  Neurologic: cranial nerves 2-12 grossly intact, no slurred speech    Most Recent Labs  Lab Results   Component Value Date    WBC 4.0 (L) 12/16/2021    HGB 11.5 (L) 12/16/2021    HCT 36.5 (L) 12/16/2021     12/16/2021     12/16/2021    K 4.2 12/16/2021     12/16/2021    CREATININE 0.8 12/16/2021    BUN 16 12/16/2021    CO2 23 12/16/2021    GLUCOSE 96 12/16/2021    ALT 13 12/16/2021    AST 18 12/16/2021    INR 1.0 12/05/2019    TSH 3.260 12/02/2021       CT ABDOMEN PELVIS WO CONTRAST Additional Contrast? None   Final Result   1. No radiopaque renal or ureteral stone identified. No hydronephrosis. 2. Large hiatal hernia. No evidence of obstruction. RECOMMENDATIONS:   Unavailable         CT HEAD WO CONTRAST   Final Result   No acute intracranial abnormality. Cortical atrophy and periventricular leukomalacia. RECOMMENDATIONS:   Unavailable         XR CHEST PORTABLE   Final Result   1. Emphysematous changes. There are no findings of failure or pneumonia   2. Cardiomegaly   3. Very large hiatal hernia. Echocardiogram 10/2020   Indeterminate diastolic function. Ejection fraction is visually estimated at 60-65%. Normal left atrium. Mild centrally directed mitral regurgitation. AV area= 1.3 cm2   Moderate aortic stenosis. No pulmonary hypertension   No pericardial effusion.     Assessment   Active Hospital Problems    Diagnosis     UTI (urinary tract infection) [N39.0]      Priority: High    Dehydration [E86.0]      Priority: Medium    Arthritis [M19.90]     Prostate cancer ([facility] [address] [C61]     Chronic anticoagulation [Z79.01]     Hypertensive disorder [I10]     Hx of pulmonary embolus [Z86.711]     Carotid artery stenosis [I65.29]     History of DVT (deep vein thrombosis) [Z86.718]     [place] (hard of hearing) [H91.90]     History of prostate cancer [Z85.46]     Hypothyroidism [E03.9]          Plan  UTI  ? Rocephin-- DC with Augmentin  ? Ucx no growth present   ? Monitor for s/s urinary retention   ? Please bladder scan patient as needed     COVID-19 pneumonia w/o hypoxia (vaccinated):   o Vitamins  o Monoclonol AB infusion if a  o Follow inflammatory markers  o Tylenol and antitussive meds prn    Physical deconditioning/Decreased functional status   · PT AM PAC 16/24    · DVT prophylaxis   · Medications, labs and imaging reviewed   · Discharge plan: Home with home care    The patient was seen and evaluated by myself and Veronica Jones MD PGY-1  12/17/2021  9:08 AM             Addendum: I have personally participated in a face-to-face history and physical exam on the date of service with the patient. I have discussed the case with the resident. I also participated in medical decision making with the resident on the date of service and I agree with all of the pertinent clinical information unless indicated in my editing of the note. I have reviewed and edited the note above based on my findings during my history, exam, and decision making on the same day of service. My additional thoughts:    Home with home health care today if all of the logistics are set   Attempt to set him up for the monoclonal antibody infusion   Continue antibiotics-change to oral on discharge    Electronically signed by Karla Trotter DO on 12/17/2021 at 11:10 AM    I can be reached through [employer].

## 2021-12-17 NOTE — PROGRESS NOTES
Infusion Center contacted regarding process of monoclonal antibody infusion. Stated once they receive fax they will contact that patient at home to set up. Stated no available stops today. Could not confirm a set date this could be done.  Electronically signed by Catrachito Valadez RN on 12/17/2021 at 11:48 AM

## 2021-12-17 NOTE — CARE COORDINATION
Social work / Discharge Planning:       [facility], [facility]. liaison updated on discharge order and home care orders on the chart.    Electronically signed by Simonne Eisenmenger, LSW on 12/17/2021 at 11:25 AM

## 2021-12-17 NOTE — PLAN OF CARE
Problem: Falls - Risk of:  Goal: Will remain free from falls  Description: Will remain free from falls  Outcome: Met This Shift  Goal: Absence of physical injury  Description: Absence of physical injury  Outcome: Met This Shift     Problem: Coping:  Goal: Ability to remain calm will improve  Description: Ability to remain calm will improve  Outcome: Met This Shift     Problem: Safety:  Goal: Ability to remain free from injury will improve  Description: Ability to remain free from injury will improve  Outcome: Met This Shift     Problem: Self-Care:  Goal: Ability to participate in self-care as condition permits will improve  Description: Ability to participate in self-care as condition permits will improve  Outcome: Met This Shift     Problem: Pain:  Goal: Pain level will decrease  Description: Pain level will decrease  Outcome: Met This Shift  Goal: Control of acute pain  Description: Control of acute pain  Outcome: Met This Shift  Goal: Control of chronic pain  Description: Control of chronic pain  Outcome: Met This Shift     Problem: Skin Integrity:  Goal: Will show no infection signs and symptoms  Description: Will show no infection signs and symptoms  Outcome: Met This Shift  Goal: Absence of new skin breakdown  Description: Absence of new skin breakdown  Outcome: Met This Shift     Problem: Musculor/Skeletal Functional Status  Goal: Highest potential functional level  Outcome: Ongoing  Goal: Absence of falls  Outcome: Met This Shift     Problem: Airway Clearance - Ineffective  Goal: Achieve or maintain patent airway  Outcome: Met This Shift     Problem: Gas Exchange - Impaired  Goal: Absence of hypoxia  Outcome: Met This Shift  Goal: Promote optimal lung function  Outcome: Met This Shift     Problem: Breathing Pattern - Ineffective  Goal: Ability to achieve and maintain a regular respiratory rate  Outcome: Met This Shift     Problem:  Body Temperature -  Risk of, Imbalanced  Goal: Ability to maintain a body temperature within defined limits  Outcome: Met This Shift  Goal: Will regain or maintain usual level of consciousness  Outcome: Met This Shift  Goal: Complications related to the disease process, condition or treatment will be avoided or minimized  Outcome: Met This Shift     Problem: Isolation Precautions - Risk of Spread of Infection  Goal: Prevent transmission of infection  Outcome: Met This Shift     Problem: Nutrition Deficits  Goal: Optimize nutritional status  Outcome: Ongoing     Problem: Risk for Fluid Volume Deficit  Goal: Maintain normal heart rhythm  Outcome: Met This Shift  Goal: Maintain absence of muscle cramping  Outcome: Met This Shift  Goal: Maintain normal serum potassium, sodium, calcium, phosphorus, and pH  Outcome: Met This Shift     Problem: Loneliness or Risk for Loneliness  Goal: Demonstrate positive use of time alone when socialization is not possible  Outcome: Met This Shift     Problem: Fatigue  Goal: Verbalize increase energy and improved vitality  Outcome: Ongoing     Problem: Patient Education: Go to Patient Education Activity  Goal: Patient/Family Education  Outcome: Ongoing

## 2021-12-17 NOTE — PROGRESS NOTES
P Quality Flow/Interdisciplinary Rounds Progress Note        Quality Flow Rounds held on December 17, 2021    Disciplines Attending:  Bedside Nurse, ,  and Nursing Unit [address] Quintin Thomason was admitted on 12/13/2021  8:11 PM    Anticipated Discharge Date:  Expected Discharge Date: 01/28/22    Disposition:    Tyler Score:  Tyler Scale Score: 16    Readmission Risk              Risk of Unplanned Readmission:  19           Discussed patient goal for the day, patient clinical progression, and barriers to discharge.   The following Goal(s) of the Day/Commitment(s) have been identified:  Diagnostics - Report Results and Labs - Report Results, discharge planning      Judy Spencer RN  December 17, 2021

## 2021-12-20 ENCOUNTER — CARE COORDINATION (OUTPATIENT)
Dept: CARE COORDINATION | Age: 86
End: 2021-12-20

## 2021-12-20 NOTE — CARE COORDINATION
Care Transitions Outreach Attempt    Call within 2 business days of discharge: Yes   Attempted to reach patient by telephone. Unable to reach patient. Left HIPAA compliant message requesting a return call. Will attempt to reach patient again. Patient: Addie Lebron Patient : 1928 MRN: [id number]    Last Discharge [facility]       Complaint Diagnosis Description Type Department Provider    21 Hematuria; Altered Mental Status; Back Pain; Hypotension Altered mental status, unspecified altered mental status type . .. ED to Hosp-Admission (Discharged) (ADMITTED) [facility] Faisal Toledo DO; Louie Barriga. .. Was this an external facility discharge?  No Discharge Facility: [facility]      Noted following upcoming appointments from discharge chart review:   [facility] follow up appointment(s):   Future Appointments   Date Time Provider Axel Lazo   2022 10:15 AM RAPHAEL Nascimento - CNP [facility]     Non-[facility] follow up appointment(s):

## 2021-12-21 ENCOUNTER — CARE COORDINATION (OUTPATIENT)
Dept: CARE COORDINATION | Age: 86
End: 2021-12-21

## 2021-12-21 ENCOUNTER — TELEPHONE (OUTPATIENT)
Dept: FAMILY MEDICINE CLINIC | Age: 86
End: 2021-12-21

## 2021-12-21 DIAGNOSIS — N39.0 URINARY TRACT INFECTION WITHOUT HEMATURIA, SITE UNSPECIFIED: Primary | ICD-10-CM

## 2021-12-21 PROCEDURE — 1111F DSCHRG MED/CURRENT MED MERGE: CPT | Performed by: INTERNAL MEDICINE

## 2021-12-21 NOTE — CARE COORDINATION
Onofre 45 Transitions Initial Follow Up Call    Call within 2 business days of discharge: Yes    Patient: Gregory Adorno Patient : 1928   MRN: [id number]  Reason for Admission: UTI, Covid  Discharge Date: 21 RARS: Readmission Risk Score: 13.3 ( )      Last Discharge [facility]       Complaint Diagnosis Description Type Department Provider    21 Hematuria; Altered Mental Status; Back Pain; Hypotension Altered mental status, unspecified altered mental status type . .. ED to Hosp-Admission (Discharged) (ADMITTED) [facility] Faisal Toledo DO; Mayra Knowles. .. Spoke with: patient and wife (on [address])  Patient states his back continues to hurt. Other than that, he is asymptomatic with regards to Covid symptoms as well as UTI symptoms. Denies shortness of breath, chest pain, fevers, chills, lightheadedness, dizziness, cough, n/v/d. After Visit  Summary reviewed with patient and wife. All medications written on discharge have been filled and patient is taking them as written. Medications were reviewed. 1111f completed. Appetite:  Good. Bowels/bladder: States hematuria as well as UTI symptoms have resolved. Follow up appointments: They are awaiting a callback from PCP office for follow up. [address] [place], [place] [address]denise Valdovinos is scheduled to do Start of Care Today for physical therapy. Care Transitions explained to patient who agrees to follow up calls. Contact information given. Patient has no other concerns or needs at this time. Will follow. Facility: [facility]   Non-face-to-face services provided:  Obtained and reviewed discharge summary and/or continuity of care documents Transitions of Care Initial Call    Was this an external facility discharge?  No Discharge Facility: [facility]      Challenges to be reviewed by the provider   Additional needs identified to be addressed with provider: No  none             Method of communication with provider : none      Advance Care Planning:   Does patient have an Advance Directive: reviewed and current. Was this a readmission? No    Care Transition Nurse (CTN) contacted the patient by telephone to perform post hospital discharge assessment. Verified name and  with patient as identifiers. Provided introduction to self, and explanation of the CTN role. CTN reviewed discharge instructions, medical action plan and red flags with patient who verbalized understanding. Patient given an opportunity to ask questions and does not have any further questions or concerns at this time. Were discharge instructions available to patient? Yes. Reviewed appropriate site of care based on symptoms and resources available to patient including: PCP, Specialist and When to call 911. The patient agrees to contact the PCP office for questions related to their healthcare. Medication reconciliation was performed with patient, who verbalizes understanding of administration of home medications. Advised obtaining a 90-day supply of all daily and as-needed medications. Covid Risk Education     Educated patient about risk for severe COVID-19 due to risk factors according to CDC guidelines. LPN CC reviewed discharge instructions, medical action plan and red flag symptoms with the patient who verbalized understanding. Discussed COVID vaccination status: No. Education provided on COVID-19 vaccination as appropriate. Discussed exposure protocols and quarantine with CDC Guidelines. Patient was given an opportunity to verbalize any questions and concerns and agrees to contact LPN CC or health care provider for questions related to their healthcare. Reviewed and educated patient on any new and changed medications related to discharge diagnosis. Was patient discharged with a pulse oximeter? No Discussed and confirmed pulse oximeter discharge instructions and when to notify provider or seek emergency care. LPN CC provided contact information.  Plan for follow-up call in 5-7 days based on severity of symptoms and risk factors. Plan for next call: symptom management-back pain, physical therapy, uti symptoms.        Care Transitions 24 Hour Call    Schedule Follow Up Appointment with PCP: Completed  Do you have any ongoing symptoms?: No  Do you have a copy of your discharge instructions?: Yes  Do you have all of your prescriptions and are they filled?: Yes  Have you been contacted by a [facility] Pharmacist?: No  Have you scheduled your follow up appointment?: Yes  How are you going to get to your appointment?: Car - family or friend to transport  Were you discharged with any Home Care or Post Acute Services: Yes  Post Acute Services: Home Health (Comment: [place])  Do you feel like you have everything you need to keep you well at home?: Yes  Care Transitions Interventions  No Identified Needs         Follow Up  Future Appointments   Date Time Provider Axel Lazo   12/27/2021  1:30 PM Adriana Elizabeth MD [facility]   4/18/2022 10:15 AM Frederick Mcnally APRN - [id number] [facility] Vipul Centeno LPN

## 2021-12-21 NOTE — TELEPHONE ENCOUNTER
----- Message from Josee Marley sent at 12/21/2021 11:26 AM EST -----  Subject: Appointment Request    Reason for Call: Routine (Patient Request) No Script    QUESTIONS  Type of Appointment? Established Patient  Reason for appointment request? No appointments available during search  Additional Information for Provider? patient was in the hospital with   covid, was put on antibiotics for his uti that he also has. he needs to be   seen within the next two weeks he can be reached [phone]  ---------------------------------------------------------------------------  --------------  CALL BACK INFO  What is the best way for the office to contact you? OK to leave message on   voicemail  Preferred Call Back Phone Number? [phone]  ---------------------------------------------------------------------------  --------------  SCRIPT ANSWERS  Relationship to Patient? Other  Representative Name? Lowell Potter  Additional information verified (besides Name and Date of Birth)? Address  (Is the patient requesting to see the provider for a procedure?)? No  (Is the patient requesting to see the provider urgently  today or   tomorrow. )? No  Have you been diagnosed with, awaiting test results for, or told that you   are suspected of having COVID-19 (Coronavirus)? (If patient has tested   negative or was tested as a requirement for work, school, or travel and   not based on symptoms, answer no)? No  Within the past two weeks have you developed any of the following symptoms   (answer no if symptoms have been present longer than 2 weeks or began   more than 2 weeks ago)? Fever or Chills, Cough, Shortness of breath or   difficulty breathing, Loss of taste or smell, Sore throat, Nasal   congestion, Sneezing or runny nose, Fatigue or generalized body aches   (answer no if pain is specific to a body part e.g. back pain), Diarrhea,   Headache? No  Have you had close contact with someone with COVID-19 in the last 14 days?    No  (Service

## 2021-12-27 ENCOUNTER — OFFICE VISIT (OUTPATIENT)
Dept: PRIMARY CARE CLINIC | Age: 86
End: 2021-12-27
Payer: MEDICARE

## 2021-12-27 VITALS
WEIGHT: 117 LBS | SYSTOLIC BLOOD PRESSURE: 120 MMHG | DIASTOLIC BLOOD PRESSURE: 72 MMHG | HEIGHT: 69 IN | BODY MASS INDEX: 17.33 KG/M2 | HEART RATE: 88 BPM | OXYGEN SATURATION: 100 % | TEMPERATURE: 97.8 F

## 2021-12-27 DIAGNOSIS — R93.7 ABNORMAL FINDINGS ON DIAGNOSTIC IMAGING OF OTHER PARTS OF MUSCULOSKELETAL SYSTEM: ICD-10-CM

## 2021-12-27 DIAGNOSIS — U07.1 COVID-19: ICD-10-CM

## 2021-12-27 DIAGNOSIS — M80.00XD AGE-RELATED OSTEOPOROSIS WITH CURRENT PATHOLOGICAL FRACTURE WITH ROUTINE HEALING, SUBSEQUENT ENCOUNTER: ICD-10-CM

## 2021-12-27 DIAGNOSIS — R13.19 ESOPHAGEAL DYSPHAGIA: ICD-10-CM

## 2021-12-27 DIAGNOSIS — C61 PROSTATE CANCER (HCC): Primary | ICD-10-CM

## 2021-12-27 DIAGNOSIS — Z87.898 HISTORY OF GROSS HEMATURIA: ICD-10-CM

## 2021-12-27 PROCEDURE — 1111F DSCHRG MED/CURRENT MED MERGE: CPT | Performed by: INTERNAL MEDICINE

## 2021-12-27 PROCEDURE — 99215 OFFICE O/P EST HI 40 MIN: CPT | Performed by: INTERNAL MEDICINE

## 2021-12-27 RX ORDER — LISINOPRIL 5 MG/1
TABLET ORAL
Qty: 90 TABLET | Refills: 1 | Status: SHIPPED
Start: 2021-12-27 | End: 2022-06-27

## 2021-12-27 NOTE — PROGRESS NOTES
Post-Discharge Transitional Care Management Services or Hospital Follow Up      Chau Vasquez   YOB: 1928    Date of Office Visit:  12/27/2021  Date of Hospital Admission: 12/13/21  Date of Hospital Discharge: 12/17/21  Risk of hospital readmission (high >=14%. Medium >=10%) :Readmission Risk Score: 13.3 ( )      Care management risk score Rising risk (score 2-5) and Complex Care (Scores >=6): 1     Non face to face  following discharge, date last encounter closed (first attempt may have been earlier): 12/21/2021 12:38 PM    Call initiated 2 business days of discharge: Yes    Patient Active Problem List   Diagnosis    Hypothyroidism    History of prostate cancer    [place] (hard of hearing)    History of DVT (deep vein thrombosis)    Hx of pulmonary embolus    Hypertensive disorder    Carotid artery stenosis    Chronic anticoagulation    Prostate cancer ([facility] [address]    Arthritis    UTI (urinary tract infection)    Dehydration       Allergies   Allergen Reactions    Eggs Or Egg-Derived Products Other (See Comments)     Violently ill for 1 week to 10 days. NO FLU SHOT    Sulfa Antibiotics Other (See Comments)     Weakness, violently ill       Medications listed as ordered at the time of discharge from hospital     Medication List          Accurate as of December 27, 2021  4:55 PM. If you have any questions, ask your nurse or doctor. CHANGE how you take these medications    lisinopril 5 MG tablet  Commonly known as: PRINIVIL;ZESTRIL  TAKE 1 TABLET BY MOUTH DAILY  What changed: See the new instructions.   Changed by: Jorge Luciano MD        CONTINUE taking these medications    acetaminophen 325 MG tablet  Commonly known as: TYLENOL     ascorbic acid 500 MG tablet  Commonly known as: VITAMIN C  Take 1 tablet by mouth daily     bicalutamide 50 MG chemo tablet  Commonly known as: CASODEX     calcitonin 200 UNIT/ACT nasal spray  Commonly known as: Miacalcin  1 spray by Nasal route daily calcium carbonate 500 MG Tabs tablet  Commonly known as: OSCAL     diclofenac sodium 1 % Gel  Commonly known as: VOLTAREN  Apply 2 g topically 4 times daily     Eliquis 2.5 MG Tabs tablet  Generic drug: apixaban  TAKE 1 TABLET BY MOUTH TWICE DAILY     fish oil 1000 MG Caps     guaiFENesin-dextromethorphan 100-10 MG/5ML syrup  Commonly known as: ROBITUSSIN DM  Take 5 mLs by mouth every 4 hours as needed for Cough     Handicap Placard Misc     leuprolide 45 MG injection  Commonly known as: LUPRON     levothyroxine 100 MCG tablet  Commonly known as: SYNTHROID  TAKE 1 TABLET BY MOUTH EVERY DAY     magnesium gluconate 500 MG tablet  Commonly known as: MAGONATE     PRESERVISION AREDS PO     SUPER B COMPLEX PO     thiamine 100 MG tablet  Take 1 tablet by mouth daily     traMADol 50 MG tablet  Commonly known as: ULTRAM     turmeric 500 MG Caps     vitamin B-12 1000 MCG tablet  Commonly known as: CYANOCOBALAMIN     vitamin D 50 MCG (2000 UT) Tabs tablet  Commonly known as: CHOLECALCIFEROL  Take 1 tablet by mouth daily     zinc sulfate 220 (50 Zn) MG capsule  Commonly known as: ZINCATE  Take 1 capsule by mouth daily           Where to Get Your Medications      These medications were sent to [place] [address], [place] - [address]    Phone: [phone]   · lisinopril 5 MG tablet           Medications marked \"taking\" at this time  No outpatient medications have been marked as taking for the 12/27/21 encounter (Office Visit) with Suraj Mejia MD.        Medications patient taking as of now reconciled against medications ordered at time of hospital discharge: Yes    Chief Complaint   Patient presents with    Follow-Up from Hospital       History of Present illness - Follow up of Hospital diagnosis(es): 75-year-old male recently admitted to the hospital with gross hematuria infusion.  CT of the abdomen and pelvis was done along with CT of the head. Several concerns I have. Some of the patient's back pain is actually along his pelvis and his PSA levels have gone up quite a bit recently. He recently was placed on Casodex and addition to his Lupron. He has not had cystoscopy and because he had severe clots that were passed during the hospitalization without evidence of a urinary tract infection I am questioning whether he has had erosion of prostate into the bladder causing this bleeding. Patient will need evaluated by Dr. Ede Andujar and we will need to further evaluate by bone scan whether or not his left posterior pelvic pain is secondary to metastatic prostate cancer. I am also questioning whether some of his back problems also are not related to metastatic prostate cancer. Patient will need oncologic evaluation. I did discuss with him and his caregiver the use of antiresorptive medications and also possibly Forteo for his crush fractures. For the time after his kyphoplasty the patient felt quite well but he did fall and then had recurrent pain. He is also seeing a chiropractor for shoulder discomfort. He is complaining of difficulty occasionally swallowing. He feels that food gets stuck in his mid esophagus. He denies pain with this however. CT of the abdomen and pelvis revealed a very large hiatal hernia and whether or not this is just the thigh Jude hernia causing the problem or other obstructive symptom is unknown but a barium swallow will be ordered. Inpatient course: Discharge summary reviewed- see chart. Interval history/Current status: See HPI. A comprehensive review of systems was negative except for what was noted in the HPI. Vitals:    12/27/21 1342   BP: 120/72   Pulse: 88   Temp: 97.8 °F (36.6 °C)   SpO2: 100%   Weight: 117 lb (53.1 kg)   Height: 5' 9\" (1.753 m)     Body mass index is 17.28 kg/m².    Wt Readings from Last 3 Encounters:   12/27/21 117 lb (53.1 kg)   12/17/21 116 lb (52.6 kg)   12/02/21 118 lb (53.5 kg)

## 2021-12-27 NOTE — TELEPHONE ENCOUNTER
Last Appointment:  2/10/2021  Future Appointments   Date Time Provider Axel Lazo   12/27/2021  1:30 PM Nimo Larkin MD [facility]   4/18/2022 10:15 AM Filiberto Stahl [facility]

## 2021-12-29 ENCOUNTER — CARE COORDINATION (OUTPATIENT)
Dept: CARE COORDINATION | Age: 86
End: 2021-12-29

## 2021-12-29 NOTE — CARE COORDINATION
Care Transitions Outreach Attempt    Call within 2 business days of discharge: No   Attempted to reach patient for transitions of care follow up. Unable to reach patient. No answer. Unable to leave a message. Patient: Carole Flores Patient : 1928 MRN: [id number]    Last Discharge [facility]       Complaint Diagnosis Description Type Department Provider    21 Hematuria; Altered Mental Status; Back Pain; Hypotension Altered mental status, unspecified altered mental status type . .. ED to Hosp-Admission (Discharged) (ADMITTED) [facility] Faisal Toledo DO; Rosanne Delaney. .. Was this an external facility discharge?  No Discharge Facility: [facility]      Noted following upcoming appointments from discharge chart review:   [facility] follow up appointment(s):   Future Appointments   Date Time Provider Axel Lazo   2022 11:30 AM Mayco Dumont  [address]   2022 10:15 AM RAPHAEL Eaton - CNP [facility]     Non[facility] follow up appointment(s):

## 2022-01-13 PROBLEM — N39.0 UTI (URINARY TRACT INFECTION): Status: RESOLVED | Noted: 2021-12-14 | Resolved: 2022-01-13

## 2022-01-13 PROBLEM — E86.0 DEHYDRATION: Status: RESOLVED | Noted: 2021-12-14 | Resolved: 2022-01-13

## 2022-01-26 DIAGNOSIS — E03.9 ACQUIRED HYPOTHYROIDISM: ICD-10-CM

## 2022-01-26 NOTE — TELEPHONE ENCOUNTER
Last Appointment:  12/27/2021  Future Appointments   Date Time Provider Axel Violet   1/27/2022  1:00 PM Chaz Lopez MD [facility]   [facility]

## 2022-01-27 ENCOUNTER — OFFICE VISIT (OUTPATIENT)
Dept: PRIMARY CARE CLINIC | Age: 87
End: 2022-01-27
Payer: MEDICARE

## 2022-01-27 VITALS
HEIGHT: 69 IN | SYSTOLIC BLOOD PRESSURE: 118 MMHG | TEMPERATURE: 97.6 F | BODY MASS INDEX: 17.63 KG/M2 | HEART RATE: 46 BPM | OXYGEN SATURATION: 100 % | WEIGHT: 119 LBS | DIASTOLIC BLOOD PRESSURE: 62 MMHG

## 2022-01-27 DIAGNOSIS — R07.89 OTHER CHEST PAIN: ICD-10-CM

## 2022-01-27 DIAGNOSIS — Z86.711 HX OF PULMONARY EMBOLUS: Chronic | ICD-10-CM

## 2022-01-27 DIAGNOSIS — M19.90 ARTHRITIS: ICD-10-CM

## 2022-01-27 DIAGNOSIS — E03.9 ACQUIRED HYPOTHYROIDISM: Primary | ICD-10-CM

## 2022-01-27 DIAGNOSIS — Z79.01 CHRONIC ANTICOAGULATION: ICD-10-CM

## 2022-01-27 DIAGNOSIS — C61 PROSTATE CANCER (HCC): ICD-10-CM

## 2022-01-27 PROCEDURE — 99214 OFFICE O/P EST MOD 30 MIN: CPT | Performed by: INTERNAL MEDICINE

## 2022-01-27 PROCEDURE — 93000 ELECTROCARDIOGRAM COMPLETE: CPT | Performed by: INTERNAL MEDICINE

## 2022-01-27 RX ORDER — LEVOTHYROXINE SODIUM 0.1 MG/1
TABLET ORAL
Qty: 90 TABLET | Refills: 1 | Status: SHIPPED
Start: 2022-01-27 | End: 2022-08-02 | Stop reason: SDUPTHER

## 2022-01-27 NOTE — PROGRESS NOTES
Please see the previous note dictated which is in quite extensive detail regarding the recent hospitalization. The patient did have follow-up with urology. They did not do cystoscopy. The patient has not had subsequent gross hematuria or hematuria of any sort. Patient did have urine for cytology which was negative. The patient is experiencing minimal memory loss. CT of the brain did not indicate evidence of stroke. Testing which was ordered last time was not completed including barium swallow, bone scan and DEXA scan. By definition the patient does have osteoporosis because of previous vertebral crush fracture. I did make a referral to the [facility] not only for treatment of his prostate cancer but also for possible infusion of Reclast or start of Forteo. Patient is complaining of chest discomfort that occurs only with lying. Is not occurring with sitting or standing. There does not seem to be any regurgitation especially at nighttime. The patient was denying dysphagia at this point but made it very clear he was having at the last visit. Patient Active Problem List   Diagnosis    Hypothyroidism    History of prostate cancer    [place] (hard of hearing)    History of DVT (deep vein thrombosis)    Hx of pulmonary embolus    Hypertensive disorder    Carotid artery stenosis    Chronic anticoagulation    Prostate cancer (HCC)    Arthritis       Allergies   Allergen Reactions    Eggs Or Egg-Derived Products Other (See Comments)     Violently ill for 1 week to 10 days. NO FLU SHOT    Sulfa Antibiotics Other (See Comments)     Weakness, violently ill       Medications listed as ordered at the time of discharge from hospital     Medication List          Accurate as of January 27, 2022  1:26 PM. If you have any questions, ask your nurse or doctor.             CONTINUE taking these medications    acetaminophen 325 MG tablet  Commonly known as: TYLENOL     ascorbic acid 500 MG tablet  Commonly known as: PSA levels have gone up quite a bit recently. He recently was placed on Casodex and addition to his Lupron. He has not had cystoscopy and because he had severe clots that were passed during the hospitalization without evidence of a urinary tract infection I am questioning whether he has had erosion of prostate into the bladder causing this bleeding. Patient will need evaluated by Dr. Kaitlin Gomez and we will need to further evaluate by bone scan whether or not his left posterior pelvic pain is secondary to metastatic prostate cancer. I am also questioning whether some of his back problems also are not related to metastatic prostate cancer. Patient will need oncologic evaluation. I did discuss with him and his caregiver the use of antiresorptive medications and also possibly Forteo for his crush fractures. For the time after his kyphoplasty the patient felt quite well but he did fall and then had recurrent pain. He is also seeing a chiropractor for shoulder discomfort. He is complaining of difficulty occasionally swallowing. He feels that food gets stuck in his mid esophagus. He denies pain with this however. CT of the abdomen and pelvis revealed a very large hiatal hernia and whether or not this is just the thigh Jude hernia causing the problem or other obstructive symptom is unknown but a barium swallow will be ordered. Inpatient course: Discharge summary reviewed- see chart. Interval history/Current status: See HPI. A comprehensive review of systems was negative except for what was noted in the HPI. There were no vitals filed for this visit. There is no height or weight on file to calculate BMI. Wt Readings from Last 3 Encounters:   12/27/21 117 lb (53.1 kg)   12/17/21 116 lb (52.6 kg)   12/02/21 118 lb (53.5 kg)     BP Readings from Last 3 Encounters:   12/27/21 120/72   12/17/21 (!) 98/57   12/02/21 102/72        Physical Exam:  Tympanic membranes are clear bilaterally.  No anterior or posterior cervical adenopathy the lungs are clear to auscultation. Regular and is not tachycardic. He does have a 2 or 6 systolic ejection murmur abdomen positive bowel sounds soft nontender no masses no organomegaly. No CVA tenderness bilaterally. Pain with palpation over the left iliac crest posteriorly. No peripheral lymphedema. Juarez was seen today for hypertension. Diagnoses and all orders for this visit:    Acquired hypothyroidism    Prostate cancer ([facility] [address]    Chronic anticoagulation    Hx of pulmonary embolus    Arthritis    Other chest pain    Patient's chest pain does not seem to be related to cardiac issues. He has no shortness of breath no nausea no diaphoresis. The patient will have an EKG. Previous testing which was ordered will be completed. This will be reviewed and addressed. The patient will need referral to the [facility].

## 2022-02-24 ENCOUNTER — HOSPITAL ENCOUNTER (OUTPATIENT)
Dept: NUCLEAR MEDICINE | Age: 87
Discharge: HOME | End: 2022-02-24
Payer: MEDICARE

## 2022-02-24 ENCOUNTER — HOSPITAL ENCOUNTER (OUTPATIENT)
Dept: MAMMOGRAPHY | Age: 87
Discharge: HOME | End: 2022-02-26
Payer: MEDICARE

## 2022-02-24 ENCOUNTER — HOSPITAL ENCOUNTER (OUTPATIENT)
Dept: GENERAL RADIOLOGY | Age: 87
Discharge: HOME | End: 2022-02-26
Payer: MEDICARE

## 2022-02-24 DIAGNOSIS — C61 PROSTATE CANCER (HCC): ICD-10-CM

## 2022-02-24 DIAGNOSIS — M80.00XD AGE-RELATED OSTEOPOROSIS WITH CURRENT PATHOLOGICAL FRACTURE WITH ROUTINE HEALING, SUBSEQUENT ENCOUNTER: ICD-10-CM

## 2022-02-24 DIAGNOSIS — R13.19 ESOPHAGEAL DYSPHAGIA: ICD-10-CM

## 2022-02-24 DIAGNOSIS — R93.7 ABNORMAL FINDINGS ON DIAGNOSTIC IMAGING OF OTHER PARTS OF MUSCULOSKELETAL SYSTEM: ICD-10-CM

## 2022-02-24 PROCEDURE — 92526 ORAL FUNCTION THERAPY: CPT | Performed by: SPEECH-LANGUAGE PATHOLOGIST

## 2022-02-24 PROCEDURE — 74230 X-RAY XM SWLNG FUNCJ C+: CPT

## 2022-02-24 PROCEDURE — A9503 TC99M MEDRONATE: HCPCS | Performed by: RADIOLOGY

## 2022-02-24 PROCEDURE — 78306 BONE IMAGING WHOLE BODY: CPT

## 2022-02-24 PROCEDURE — 77080 DXA BONE DENSITY AXIAL: CPT

## 2022-02-24 PROCEDURE — 92611 MOTION FLUOROSCOPY/SWALLOW: CPT | Performed by: SPEECH-LANGUAGE PATHOLOGIST

## 2022-02-24 PROCEDURE — 3430000000 HC RX DIAGNOSTIC RADIOPHARMACEUTICAL: Performed by: RADIOLOGY

## 2022-02-24 RX ORDER — TC 99M MEDRONATE 20 MG/10ML
25 INJECTION, POWDER, LYOPHILIZED, FOR SOLUTION INTRAVENOUS
Status: COMPLETED | OUTPATIENT
Start: 2022-02-24 | End: 2022-02-24

## 2022-02-24 RX ADMIN — TC 99M MEDRONATE 25 MILLICURIE: 20 INJECTION, POWDER, LYOPHILIZED, FOR SOLUTION INTRAVENOUS at 10:16

## 2022-02-24 NOTE — PROGRESS NOTES
SPEECH/LANGUAGE PATHOLOGY  VIDEOFLUOROSCOPIC STUDY OF SWALLOWING (MBS)   and PLAN OF CARE    PATIENT NAME:  Kwame Zhang  (male)     MRN:  [id number]    :  1928  (80 y.o.)  STATUS:  Outpatient    TODAY'S DATE:  2022   SPECIFIC PROVIDER ORDER: FL modified barium swallow with video. Jeyson Elizabeth MD. 2022   REASON FOR REFERRAL: assess for dysphagia   EVALUATING THERAPIST: Roger Ortega, SLP Graduate Clinician  Milana VELEZ CCC/SLP [id number]  Speech-Language Pathologist     RESULTS:      DYSPHAGIA DIAGNOSIS:  mild pharyngeal phase dysphagia     No aspiration observed during MBSS. Penetration with nectar and thin liquids was present. However, penetration was shallow and not a concern for aspiration at this time due to patient denying recent pneumonia. Patient feels a sensation that food feels like it is \"stuck\" in his mid esophagus. An esophagram is recommended to further assess if symptoms continue. DIET RECOMMENDATIONS:  Regular consistency solids (IDDSI level 7) with thin liquids (IDDSI level 0) using compensatory strategies. FEEDING RECOMMENDATIONS:    Assistance level:  No assistance needed     Compensatory strategies recommended: Small bites/sips and Alternate solids and liquids     Discussed recommendations with nursing and/or faxed report to referring provider: Yes    Laryngeal Penetration and Aspiration:  Penetration WITHOUT aspiration was observed in today's study with thin liquid, mildly thick liquid (nectar)    SPEECH THERAPY  PLAN OF CARE   The dysphagia POC is established based on physician order and dysphagia diagnosis    Dysphagia therapy is not recommended at this time. However, if symptoms worsen, patient may benefit from outpatient dysphagia therapy.       Conditions Requiring Skilled Therapeutic Intervention for dysphagia:    Not applicable    SPECIFIC DYSPHAGIA INTERVENTIONS TO INCLUDE:     not applicable    Specific instructions for next treatment:  not applicable Treatment Goals:    Short Term Goals:  Not applicable no therapy warranted     Long Term Goals:   Not applicable no therapy warranted      Patient/family Goal:    not applicable    Rehabilitation Potential/Prognosis: not applicable                    ADMITTING DIAGNOSIS: Esophageal dysphagia [R13.19]     VISIT DIAGNOSIS:   Visit Diagnoses       Codes    Esophageal dysphagia     R13.19            PATIENT REPORT/COMPLAINT: Denies difficulty swallowing in regards to what this assessment shows (oropharyngeal swallow). However, patient feels a sensation that food feels like it is \"stuck\" in his mid esophagus. An esophagram is recommended to further assess if symptoms continue. PRIOR LEVEL OF SWALLOW FUNCTION:    Past History of Dysphagia?:  none reported    Home diet: Regular consistency solids (IDDSI level 7) with thin liquids (IDDSI level 0)  Current Diet Order:  No diet orders on file    PROCEDURE:  Consistencies Administered During the Evaluation   Liquids: thin liquid and nectar thick liquid   Solids:  pureed foods, solid foods and Barium tablet      Method of Intake:   cup, straw, spoon  Self fed      Position:   Seated, upright, Lateral plane    INSTRUMENTAL ASSESSMENT:    ORAL PREP/ ORAL PHASE:    The oral stage of swallowing was within functional limits for consistencies administered. However, patient had prolonged mastication with solid food. PHARYNGEAL PHASE:     ONSET TIME       Delayed initiation of the pharyngeal swallow was noted with swallow reflex triggered slightly inferior to the vallecula        PHARYNGEAL RESIDUALS        Vallecula/Pharyngeal Wall           Reduced tongue base retraction resulting in residuals in vallecula and/or posterior pharyngeal wall for all consistencies administered which cleared with spontaneous double swallow.        Pyriform Sinuses      No significant residuals were noted in the pyriform sinuses     LARYNGEAL PENETRATION   Laryngeal penetration occurred in the absence of aspiration DURING the swallow for thin liquid and nectar consistency liquid due to inadequate laryngeal closure which cleared eventually from the laryngeal vestibule. Laryngeal penetration was mild and and shallow and occurred inconsistently. The thin liquid from a straw did not appear to penetrate. A cough and/or throat clear was not observed. ASPIRATION  Aspiration was not present during this evaluation    PENETRATION-ASPIRATION SCALE (PAS):  THIN 2 = Material enters the airway, remains above vocal folds, and is ejected from the airway   MILDLY THICK 2 = Material enters the airway, remains above vocal folds, and is ejected from the airway   MODERATELY THICK item not administered  PUREE 1 = Material does not enter the airway  HARD SOLID 1 = Material does not enter the airway       COMPENSATORY STRATEGIES    Compensatory strategies were not attempted      STRUCTURAL/FUNCTIONAL ANOMALIES   No structural/functional anomalies were noted    CERVICAL ESOPHAGEAL STAGE :     Was no assessed. ___________    Cognition:   Follows 1 - step directions appropriate for this assessment    Oral Peripheral Examination   Adequate lingual/labial strength     Current Respiratory Status   room air     Parameters of Speech Production  Respiration:  Adequate for speech production  Quality:   Within functional limits  Intensity: Within functional limits    Pain: No pain reported. EDUCATION:   The Speech Language Pathologist (SLP) completed education regarding results of evaluation and that intervention is not warranted at this time. Learner: Patient and patient's neighbor/friend  Education: Reviewed results and recommendations of this evaluation  Evaluation of Education:  Verbalizes understanding    This plan may be re-evaluated and revised as warranted.         Evaluation Time includes thorough review of current medical information, gathering information on past medical history/social history and prior level of

## 2022-02-25 DIAGNOSIS — C61 PROSTATE CANCER (HCC): Primary | ICD-10-CM

## 2022-02-25 DIAGNOSIS — M80.80XD OTHER OSTEOPOROSIS WITH CURRENT PATHOLOGICAL FRACTURE WITH ROUTINE HEALING, SUBSEQUENT ENCOUNTER: ICD-10-CM

## 2022-02-25 DIAGNOSIS — Z87.310 HISTORY OF HEALED FRAGILITY FRACTURE: ICD-10-CM

## 2022-02-27 ENCOUNTER — APPOINTMENT (OUTPATIENT)
Dept: CT IMAGING | Age: 87
End: 2022-02-27
Payer: MEDICARE

## 2022-02-27 ENCOUNTER — HOSPITAL ENCOUNTER (EMERGENCY)
Age: 87
Discharge: HOME | End: 2022-02-27
Payer: MEDICARE

## 2022-02-27 ENCOUNTER — APPOINTMENT (OUTPATIENT)
Dept: GENERAL RADIOLOGY | Age: 87
End: 2022-02-27
Payer: MEDICARE

## 2022-02-27 VITALS
OXYGEN SATURATION: 96 % | RESPIRATION RATE: 16 BRPM | TEMPERATURE: 97 F | SYSTOLIC BLOOD PRESSURE: 110 MMHG | HEIGHT: 68 IN | DIASTOLIC BLOOD PRESSURE: 56 MMHG | BODY MASS INDEX: 18.19 KG/M2 | HEART RATE: 84 BPM | WEIGHT: 120 LBS

## 2022-02-27 DIAGNOSIS — S20.211A CONTUSION OF RIGHT CHEST WALL, INITIAL ENCOUNTER: Primary | ICD-10-CM

## 2022-02-27 PROCEDURE — 71250 CT THORAX DX C-: CPT

## 2022-02-27 PROCEDURE — 73030 X-RAY EXAM OF SHOULDER: CPT

## 2022-02-27 PROCEDURE — 99284 EMERGENCY DEPT VISIT MOD MDM: CPT

## 2022-02-27 ASSESSMENT — PAIN DESCRIPTION - PAIN TYPE: TYPE: ACUTE PAIN

## 2022-02-27 ASSESSMENT — PAIN SCALES - GENERAL: PAINLEVEL_OUTOF10: 2

## 2022-02-27 ASSESSMENT — PAIN DESCRIPTION - LOCATION: LOCATION: CHEST

## 2022-02-27 ASSESSMENT — PAIN DESCRIPTION - ORIENTATION: ORIENTATION: RIGHT;ANTERIOR

## 2022-02-27 NOTE — ED PROVIDER NOTES
[address] [place] [address]  Department of Emergency Medicine   ED  Encounter Note  Admit Date/RoomTime: 2022 10:59 AM  ED Room:     NAME: Claudene Dryer  : 1928  MRN: [id number]     Chief Complaint:  Rib Pain (injury) (anterior chest wall pain secondary to mechanical fall yesterday. Pt is on eliquis Pain worse with palpation)    History of Present Illness       Claudene Dryer is a 80 y.o. old male who presents to the emergency department by private vehicle, for a mechanical fall from a step stool which occured 1 day(s) prior to arrival. He reportedly sitting on a stool while attempting to light a gas on his fireplace when he lost his balance and fell backwards striking his right chest against some object on the floor. He been complaining of pain to his right side of the chest and to the right shoulder since the injury. Patient does take blood thinners. Besides the injury to the shoulder and chest he denies any other complaints. He did not strike his head or lose consciousness. The incident was witnessed by his wife. ROS   Pertinent positives and negatives are stated within HPI, all other systems reviewed and are negative. Past Medical History:  has a past medical history of History of prostate cancer, History of spinal fracture, [place] (hard of hearing), Hypertension, Hypothyroidism, Leg cramps, Prostate cancer ([address] and Pulmonary emboli ([address] Surgical History:  has a past surgical history that includes other surgical history (approx 8 yrs ago); Knee arthroscopy; Hand tendon surgery; Inguinal hernia repair (12); Colonoscopy (); Mastoid surgery; other surgical history (Right, 2017); and hernia repair (age 15). Social History:  reports that he has never smoked. He has never used smokeless tobacco. He reports current alcohol use. He reports that he does not use drugs.     Family History: family history includes Cancer in his brother and father; Heart Attack in his daughter; Stroke in his mother. Allergies: Eggs or egg-derived products and Sulfa antibiotics    Physical Exam   Oxygen Saturation Interpretation: Normal.        ED Triage Vitals [02/27/22 1056]   BP Temp Temp Source Pulse Resp SpO2 Height Weight   (!) 108/56 97 °F (36.1 °C) Temporal 86 16 97 % 5' 8\" (1.727 m) 120 lb (54.4 kg)         Physical Exam  Constitutional:  Alert, development consistent with age. HEENT:  NC/NT. Airway patent. Neck:  No midline or paravertebral tenderness. Normal ROM. Supple. Chest:  Symmetrical without visible rash. There is mild to moderate point tenderness over the right anterior lateral chest wall which does reproduce the pain. No crepitus palpated. Respiratory:  Lungs Clear to auscultation and breath sounds equal.  CV:  Regular rate and rhythm, normal heart sounds, without pathological murmurs, ectopy, gallops, or rubs. GI:  Abdomen Soft, nontender, good bowel sounds. No firm or pulsatile mass. Pelvis:  Stable, nontender to palpation. Back:  No midline or paravertebral tenderness. No costovertebral tenderness. Extremities: There is mild swelling with tenderness to palpation of the right shoulder with limited range of motion. Distal pulses intact. Elbow hand and wrist are nontraumatic nontender. Remainder of upper and lower extremities are nontraumatic nontender. Integument:  Normal turgor. Warm, dry, without visible rash, unless noted elsewhere. Lymphatic: no lymphadenopathy noted  Neurological:  Oriented x3, GCS 15. Motor functions intact. Lab / Imaging Results   (All laboratory and radiology results have been personally reviewed by myself)  Labs:  No results found for this visit on 02/27/22. Imaging: All Radiology results interpreted by Radiologist unless otherwise noted.   CT CHEST WO CONTRAST   Preliminary Result   No distinct evidence for acute traumatic findings of the chest with   background chronic changes the lungs and multilevel compression deformities   throughout the thoracolumbar spine appearing mostly chronic without obvious   retropulsion at multiple levels throughout the mid and lower thoracic spine   as detailed above similar to 11/10/2021 chest x-ray. Hiatal hernia containing intrathoracic stomach and associated transverse   colon or bowel creating prominence in lower chest      RECOMMENDATIONS:   If there is further concern for acute on chronic compression deformity of the   thoracic spine MRI may be considered for further evaluation         XR SHOULDER RIGHT (MIN 2 VIEWS)   Final Result   No fracture or dislocation. Osteo-degenerative changes involving the   visualized thoracic spine. Diffuse osteopenia. Large, complex hiatal hernia. ED Course / Medical Decision Making   Medications - No data to display     Re-examination:  2/27/22     Time: 1250  Patients symptoms are stable. .    MDM:   Patient had mechanical fall from relatively low height off the ground resulting in pain injury to the right ribs and shoulder. Imaging unremarkable. Family member at bedside who reviewed all of the results today and plans for discharge. Plan of Care/Counseling:  Oleh Ahumada, [place] reviewed today's visit with the patient in addition to providing specific details for the plan of care and counseling regarding the diagnosis and prognosis. Questions are answered at this time and are agreeable with the plan. Assessment      1. Contusion of right chest wall, initial encounter      Plan   Discharged home. Patient condition is good    New Medications     New Prescriptions    No medications on file     Electronically signed by Oleh Ahumada, PA   DD: 2/27/22  **This report was transcribed using voice recognition software. Every effort was made to ensure accuracy; however, inadvertent computerized transcription errors may be present.   END OF ED PROVIDER NOTE       Wayland Babinski, [place]  02/27/22 [id number]

## 2022-04-18 ENCOUNTER — OFFICE VISIT (OUTPATIENT)
Dept: ENT CLINIC | Age: 87
End: 2022-04-18
Payer: MEDICARE

## 2022-04-18 VITALS
TEMPERATURE: 97.1 F | HEART RATE: 70 BPM | SYSTOLIC BLOOD PRESSURE: 80 MMHG | HEIGHT: 68 IN | BODY MASS INDEX: 18.19 KG/M2 | OXYGEN SATURATION: 89 % | DIASTOLIC BLOOD PRESSURE: 61 MMHG | WEIGHT: 120 LBS

## 2022-04-18 DIAGNOSIS — H61.23 EXCESSIVE CERUMEN IN BOTH EAR CANALS: Primary | ICD-10-CM

## 2022-04-18 PROCEDURE — 69210 REMOVE IMPACTED EAR WAX UNI: CPT | Performed by: NURSE PRACTITIONER

## 2022-04-18 NOTE — PROGRESS NOTES
Subjective:      Patient ID:  Chaz Guerra is a 80 y.o. male. HPI:    Pt presents with a history of cerumen impaction removal.   The patients ear was last cleaned 6 month(s) ago. The patient was not using ear drops to loosen wax immediately prior to this visit. Hearing aids: no      Past Medical History:   Diagnosis Date    History of prostate cancer 1970    radiation for 1 month and then seed implants     History of spinal fracture     [place] (hard of hearing)     Hypertension     Hypothyroidism 2004    Leg cramps     Prostate cancer ([address]     Pulmonary emboli ([address]      Past Surgical History:   Procedure Laterality Date    COLONOSCOPY  2014    [place]    HAND TENDON SURGERY      HERNIA REPAIR  age 15   [place] [id number]  6/7/12    RIGHT , WITH MESH    KNEE ARTHROSCOPY      MASTOID SURGERY      OTHER SURGICAL HISTORY  approx 8 yrs ago    prostate radiation and implanted seeds    OTHER SURGICAL HISTORY Right 04/30/2017    head laceration      Family History   Problem Relation Age of Onset    Heart Attack Daughter     Stroke Mother     Cancer Father         lung, smoking     Cancer Brother         throat, smoking      Social History     Socioeconomic History    Marital status:      Spouse name: None    Number of children: None    Years of education: None    Highest education level: None   Occupational History    Occupation: business owner   Tobacco Use    Smoking status: Never Smoker    Smokeless tobacco: Never Used   Vaping Use    Vaping Use: Never used   Substance and Sexual Activity    Alcohol use: Yes     Comment: socially    Drug use: No    Sexual activity: Not Currently     Partners: Female   Other Topics Concern    None   Social History Narrative    ** Merged History Encounter **    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 He has been on the same location on [employer] for many years.   His wife has had a stroke and he is the primary caregiver. Social Determinants of Health     Financial Resource Strain:     Difficulty of Paying Living Expenses: Not on file   Food Insecurity:     Worried About Running Out of Food in the Last Year: Not on file    Marleni of Food in the Last Year: Not on file   Transportation Needs:     Lack of Transportation (Medical): Not on file    Lack of Transportation (Non-Medical): Not on file   Physical Activity:     Days of Exercise per Week: Not on file    Minutes of Exercise per Session: Not on file   Stress:     Feeling of Stress : Not on file   Social Connections:     Frequency of Communication with Friends and Family: Not on file    Frequency of Social Gatherings with Friends and Family: Not on file    Attends Gnosticism Services: Not on file    Active Member of [address] [place] [address] [employer] or Organizations: Not on file    Attends Club or Organization Meetings: Not on file    Marital Status: Not on file   Intimate Partner Violence:     Fear of Current or Ex-Partner: Not on file    Emotionally Abused: Not on file    Physically Abused: Not on file    Sexually Abused: Not on file   Housing Stability:     Unable to Pay for Housing in the Last Year: Not on file    Number of [place] in the Last Year: Not on file    Unstable Housing in the Last Year: Not on file     Allergies   Allergen Reactions    Eggs Or Egg-Derived Products Other (See Comments)     Violently ill for 1 week to 10 days. NO FLU SHOT    Sulfa Antibiotics Other (See Comments)     Weakness, violently ill       Review of Systems   HENT: Positive for hearing loss. Objective:     Vitals:    04/18/22 1038   BP: 80/61   Pulse: 70   Temp: 97.1 °F (36.2 °C)   SpO2: (!) 89%     Physical Exam  HENT:      Right Ear: Tympanic membrane, ear canal and external ear normal. There is impacted cerumen. Left Ear: Tympanic membrane, ear canal and external ear normal. There is impacted cerumen.       Ears:      Comments: Bilateral TMs normal after cerumen removed. Cerumen removal     Auditory canal(s) both ears partially obstructed with cerumen. A microscope was used . Cerumen was gently removed using soft plastic curette. Tympanic membranes are intact following the procedure. Auditory canals appear normal.            Assessment:       Diagnosis Orders   1. Excessive cerumen in both ear canals  ME REMOVAL IMPACTED CERUMEN INSTRUMENTATION UNILAT              Plan:      Bilateral partial cerumen impactions removed without difficulty. Patient tolerated well. He will follow-up in 6 months for repeat cleaning. He is instructed to call with any new or worsening symptoms prior to his next appointment.         Anastasia Adams, MSN, FNP-C  [facility], Nose and Throat    The information contained in this note has been dictated using drug and medical speech recognition software and may contain errors

## 2022-05-04 ENCOUNTER — OFFICE VISIT (OUTPATIENT)
Dept: RHEUMATOLOGY | Age: 87
End: 2022-05-04
Payer: MEDICARE

## 2022-05-04 VITALS
WEIGHT: 120 LBS | HEART RATE: 98 BPM | DIASTOLIC BLOOD PRESSURE: 62 MMHG | OXYGEN SATURATION: 95 % | BODY MASS INDEX: 18.19 KG/M2 | RESPIRATION RATE: 14 BRPM | HEIGHT: 68 IN | SYSTOLIC BLOOD PRESSURE: 102 MMHG

## 2022-05-04 DIAGNOSIS — C61 PROSTATE CANCER (HCC): ICD-10-CM

## 2022-05-04 DIAGNOSIS — M81.0 AGE-RELATED OSTEOPOROSIS WITHOUT CURRENT PATHOLOGICAL FRACTURE: Primary | ICD-10-CM

## 2022-05-04 DIAGNOSIS — Z79.899 HIGH RISK MEDICATION USE: ICD-10-CM

## 2022-05-04 PROCEDURE — 99204 OFFICE O/P NEW MOD 45 MIN: CPT | Performed by: INTERNAL MEDICINE

## 2022-05-04 RX ORDER — TERIPARATIDE 250 UG/ML
20 INJECTION, SOLUTION SUBCUTANEOUS DAILY
Qty: 7.44 ML | Refills: 1 | Status: SHIPPED
Start: 2022-05-04 | End: 2022-08-03 | Stop reason: SDUPTHER

## 2022-05-04 ASSESSMENT — ENCOUNTER SYMPTOMS
BACK PAIN: 1
COLOR CHANGE: 0
TROUBLE SWALLOWING: 0
NAUSEA: 0
VOMITING: 0
COUGH: 0
DIARRHEA: 0
ABDOMINAL PAIN: 0
SHORTNESS OF BREATH: 0

## 2022-05-04 NOTE — PROGRESS NOTES
Jyoti Contreras 1/7/1928 is a 80 y.o. male, here for evaluation of the following chief complaint(s):  New Patient (osteoporosis, no concerns )         ASSESSMENT/PLAN:    Jyoti Contreras 1/7/1928 is a 80 y.o. male seen in consult for osteoporosis. 1.  Osteoporosis-he has history of compression fracture. On bone density scan his bones are very thin. He has a lowest T score of -5.8 in the forearm and -4.3 in the hip. Given severe osteoporosis we would like to start him on something that will build bone. We will start him on Forteo. We discussed the risks, benefits, side effects. 2.  Medication monitoring-we will update calcium level and vitamin D.    3.  Prostate cancer-no history of bony metastasis. Something however to be mindful of on Forteo. 1. Age-related osteoporosis without current pathological fracture  -     Basic Metabolic Panel; Future  -     Vitamin D 25 Hydroxy; Future  2. High risk medication use  3. Prostate cancer ([facility]      Return in about 6 months (around 11/4/2022). Subjective   SUBJECTIVE/OBJECTIVE:    HPI: Jyoti Contreras 1/7/1928 is a 80 y.o. male seen in consult for severe osteoporosis. Patient has a history of compression fracture. He has never had a hip fracture. Bone density scan from February 2022 shows a lowest T score of -5.8 in the forearm and -4.3 in the hip. He has never been on treatment for osteoporosis apparently. He does take vitamin D. He complains of some back pain but otherwise feels well. Past Medical History:   Diagnosis Date    History of prostate cancer 1970    radiation for 1 month and then seed implants     History of spinal fracture     [place] (hard of hearing)     Hypertension     Hypothyroidism 2004    Leg cramps     Pro[facility] cancer [facility]     Pulmonary emboli (H[facility])         Review of Systems   Constitutional: Negative for fatigue and fever. HENT: Negative for mouth sores and trouble swallowing.     Respiratory: Negative for cough and shortness of breath. Cardiovascular: Negative for chest pain. Gastrointestinal: Negative for abdominal pain, diarrhea, nausea and vomiting. Genitourinary: Negative for dysuria and hematuria. Musculoskeletal: Positive for back pain. Negative for arthralgias and joint swelling. Skin: Negative for color change and rash. Neurological: Negative for weakness and numbness. Hematological: Negative for adenopathy. All other systems reviewed and are negative. Objective   Vitals:    05/04/22 1102   BP: 102/62   Pulse: 98   Resp: 14   SpO2: 95%      Physical Exam  Constitutional:       General: He is not in acute distress. Appearance: Normal appearance. HENT:      Head: Normocephalic and atraumatic. Right Ear: External ear normal.      Left Ear: External ear normal.      Nose: Nose normal.   Eyes:      General: No scleral icterus. Pulmonary:      Effort: Pulmonary effort is normal.   Musculoskeletal:         General: No swelling, tenderness or deformity. Skin:     General: Skin is warm and dry. Findings: No rash. Neurological:      General: No focal deficit present. Mental Status: He is alert and oriented to person, place, and time. Mental status is at baseline.    Psychiatric:         Mood and Affect: Mood normal.         Behavior: Behavior normal.            Lab Results   Component Value Date    WBC 4.9 12/17/2021    HGB 13.3 12/17/2021    HCT 42.4 12/17/2021    MCV 86.9 12/17/2021     12/17/2021     Lab Results   Component Value Date     12/17/2021    K 4.3 12/17/2021     12/17/2021    CO2 25 12/17/2021    BUN 13 12/17/2021    CREATININE 0.7 12/17/2021    GLUCOSE 98 12/17/2021    CALCIUM 9.2 12/17/2021    PROT 6.7 12/17/2021    LABALBU 3.6 12/17/2021    BILITOT 0.4 12/17/2021    ALKPHOS 131 (H) 12/17/2021    AST 23 12/17/2021    ALT 19 12/17/2021    LABGLOM >60 12/17/2021    GFRAA >60 12/17/2021     No results found for: APARNA  No results found for: RHEUMFACTOR  No results found for: SEDRATE  No results found for: CRP         An electronic signature was used to authenticate this note. This note was generated with a voice recognition dictation system. Please disregard any errors or omission which have escaped my review.     --Jose C Cooper, DO

## 2022-05-09 ENCOUNTER — TELEPHONE (OUTPATIENT)
Dept: RHEUMATOLOGY | Age: 87
End: 2022-05-09

## 2022-05-09 NOTE — TELEPHONE ENCOUNTER
Patient's daughter Isai Christian called back and I addressed the message ith her about the copay price, she will talk with patient and his spouse and decide if they want to setup patient assistance or not. Isai Christian will let our office know and notify us on decision.       Electronically signed by Weston Elizondo MA on 5/9/2022 at 1:06 PM

## 2022-05-09 NOTE — TELEPHONE ENCOUNTER
05/09/2022 Patient's medication teriparatide required prior auth and has been approved. PA Case: [id number], Status: Approved, Coverage Starts on: 2/7/2022 12:00:00 AM, Coverage Ends on: 4/28/2024 12:00:00 AM.      [facility] pharmacy has been notified of this message, they re-ran the medication and the copay is $883. Addressed with Dr. Lorena Chao about the copay and he suggest that we ask the patient if they would like to go with patient assistance. I left a message on pt's phone to call the office back.         Electronically signed by René Garcia MA on 5/9/2022 at 9:38 AM

## 2022-05-09 NOTE — TELEPHONE ENCOUNTER
Patient's daughter Swetha Potter, called the office, I missed the call and called her back leaving a message for a returned call.       Electronically signed by Carole Barboza MA on 5/9/2022 at 1:03 PM

## 2022-05-09 NOTE — TELEPHONE ENCOUNTER
Isai Second called and left a voice message stating that the patient does not want to proceed with the teriparatide medication and would like a different recommendation. Addressed with Alivia Gifford and he said we can switch to Prolia at the infusion center but it is not the ideal medication for his condition, but it is at least something to help slow down the bone loss issue. I left a detailed message on the daughters phone informing of this message and to call our office back with how they feel about having that injection done at the infusion center.       Electronically signed by Weston Elizondo MA on 5/9/2022 at 1:24 PM

## 2022-05-10 ENCOUNTER — TELEPHONE (OUTPATIENT)
Dept: RHEUMATOLOGY | Age: 87
End: 2022-05-10

## 2022-06-25 DIAGNOSIS — E03.9 ACQUIRED HYPOTHYROIDISM: ICD-10-CM

## 2022-06-25 DIAGNOSIS — I10 ESSENTIAL HYPERTENSION: Chronic | ICD-10-CM

## 2022-06-25 DIAGNOSIS — Z79.01 CHRONIC ANTICOAGULATION: ICD-10-CM

## 2022-06-27 RX ORDER — APIXABAN 2.5 MG/1
TABLET, FILM COATED ORAL
Qty: 60 TABLET | Refills: 5 | Status: SHIPPED
Start: 2022-06-27 | End: 2022-08-02 | Stop reason: SDUPTHER

## 2022-06-27 RX ORDER — LISINOPRIL 5 MG/1
TABLET ORAL
Qty: 90 TABLET | Refills: 1 | Status: SHIPPED
Start: 2022-06-27 | End: 2022-08-02 | Stop reason: SDUPTHER

## 2022-06-27 NOTE — TELEPHONE ENCOUNTER
Last Appointment:  1/27/2022  Future Appointments   Date Time Provider Axel Jaramilloi   8/2/2022 11:00 AM Da Da Silva MD [facility]   8/2/2022  2:30 PM Da Da Silva MD [facility]   10/24/2022 10:15 AM Millie Richardson, APRN - [facility]   11/7/2022 10:40 AM Lynette Helm DO [facility]

## 2022-07-25 DIAGNOSIS — E03.9 ACQUIRED HYPOTHYROIDISM: ICD-10-CM

## 2022-07-25 RX ORDER — LEVOTHYROXINE SODIUM 0.1 MG/1
TABLET ORAL
Qty: 90 TABLET | Refills: 1 | OUTPATIENT
Start: 2022-07-25

## 2022-08-02 ENCOUNTER — OFFICE VISIT (OUTPATIENT)
Dept: FAMILY MEDICINE CLINIC | Age: 87
End: 2022-08-02
Payer: MEDICARE

## 2022-08-02 VITALS
WEIGHT: 121.4 LBS | OXYGEN SATURATION: 97 % | TEMPERATURE: 98.5 F | DIASTOLIC BLOOD PRESSURE: 60 MMHG | SYSTOLIC BLOOD PRESSURE: 120 MMHG | HEART RATE: 96 BPM | BODY MASS INDEX: 18.46 KG/M2

## 2022-08-02 DIAGNOSIS — C61 PROSTATE CANCER (HCC): ICD-10-CM

## 2022-08-02 DIAGNOSIS — M81.0 AGE-RELATED OSTEOPOROSIS WITHOUT CURRENT PATHOLOGICAL FRACTURE: ICD-10-CM

## 2022-08-02 DIAGNOSIS — I10 ESSENTIAL HYPERTENSION: Chronic | ICD-10-CM

## 2022-08-02 DIAGNOSIS — E03.9 ACQUIRED HYPOTHYROIDISM: ICD-10-CM

## 2022-08-02 DIAGNOSIS — M81.0 AGE-RELATED OSTEOPOROSIS WITHOUT CURRENT PATHOLOGICAL FRACTURE: Primary | ICD-10-CM

## 2022-08-02 DIAGNOSIS — Z79.01 CHRONIC ANTICOAGULATION: ICD-10-CM

## 2022-08-02 LAB
ALBUMIN SERPL-MCNC: 3.9 G/DL (ref 3.5–5.2)
ALP BLD-CCNC: 106 U/L (ref 40–129)
ALT SERPL-CCNC: <5 U/L (ref 0–40)
ANION GAP SERPL CALCULATED.3IONS-SCNC: 11 MMOL/L (ref 7–16)
AST SERPL-CCNC: 16 U/L (ref 0–39)
BASOPHILS ABSOLUTE: 0.03 E9/L (ref 0–0.2)
BASOPHILS RELATIVE PERCENT: 0.4 % (ref 0–2)
BILIRUB SERPL-MCNC: 0.3 MG/DL (ref 0–1.2)
BUN BLDV-MCNC: 25 MG/DL (ref 6–23)
CALCIUM SERPL-MCNC: 9.2 MG/DL (ref 8.6–10.2)
CHLORIDE BLD-SCNC: 102 MMOL/L (ref 98–107)
CHOLESTEROL, TOTAL: 166 MG/DL (ref 0–199)
CO2: 25 MMOL/L (ref 22–29)
CREAT SERPL-MCNC: 0.9 MG/DL (ref 0.7–1.2)
EOSINOPHILS ABSOLUTE: 0.09 E9/L (ref 0.05–0.5)
EOSINOPHILS RELATIVE PERCENT: 1.3 % (ref 0–6)
GFR AFRICAN AMERICAN: >60
GFR NON-AFRICAN AMERICAN: >60 ML/MIN/1.73
GLUCOSE BLD-MCNC: 122 MG/DL (ref 74–99)
HCT VFR BLD CALC: 42.3 % (ref 37–54)
HDLC SERPL-MCNC: 62 MG/DL
HEMOGLOBIN: 13.3 G/DL (ref 12.5–16.5)
IMMATURE GRANULOCYTES #: 0.02 E9/L
IMMATURE GRANULOCYTES %: 0.3 % (ref 0–5)
LDL CHOLESTEROL CALCULATED: 95 MG/DL (ref 0–99)
LYMPHOCYTES ABSOLUTE: 1.09 E9/L (ref 1.5–4)
LYMPHOCYTES RELATIVE PERCENT: 15.5 % (ref 20–42)
MCH RBC QN AUTO: 29.2 PG (ref 26–35)
MCHC RBC AUTO-ENTMCNC: 31.4 % (ref 32–34.5)
MCV RBC AUTO: 93 FL (ref 80–99.9)
MONOCYTES ABSOLUTE: 0.79 E9/L (ref 0.1–0.95)
MONOCYTES RELATIVE PERCENT: 11.3 % (ref 2–12)
NEUTROPHILS ABSOLUTE: 5 E9/L (ref 1.8–7.3)
NEUTROPHILS RELATIVE PERCENT: 71.2 % (ref 43–80)
PDW BLD-RTO: 15 FL (ref 11.5–15)
PLATELET # BLD: 265 E9/L (ref 130–450)
PMV BLD AUTO: 9.6 FL (ref 7–12)
POTASSIUM SERPL-SCNC: 4.4 MMOL/L (ref 3.5–5)
RBC # BLD: 4.55 E12/L (ref 3.8–5.8)
SODIUM BLD-SCNC: 138 MMOL/L (ref 132–146)
TOTAL PROTEIN: 6.6 G/DL (ref 6.4–8.3)
TRIGL SERPL-MCNC: 43 MG/DL (ref 0–149)
TSH SERPL DL<=0.05 MIU/L-ACNC: 1.78 UIU/ML (ref 0.27–4.2)
VLDLC SERPL CALC-MCNC: 9 MG/DL
WBC # BLD: 7 E9/L (ref 4.5–11.5)

## 2022-08-02 PROCEDURE — 99214 OFFICE O/P EST MOD 30 MIN: CPT | Performed by: INTERNAL MEDICINE

## 2022-08-02 PROCEDURE — 1123F ACP DISCUSS/DSCN MKR DOCD: CPT | Performed by: INTERNAL MEDICINE

## 2022-08-02 RX ORDER — LISINOPRIL 5 MG/1
TABLET ORAL
Qty: 90 TABLET | Refills: 1 | Status: SHIPPED | OUTPATIENT
Start: 2022-08-02

## 2022-08-02 RX ORDER — LEVOTHYROXINE SODIUM 0.1 MG/1
TABLET ORAL
Qty: 90 TABLET | Refills: 1 | Status: SHIPPED | OUTPATIENT
Start: 2022-08-02

## 2022-08-02 NOTE — PROGRESS NOTES
History of prostate cancer and remains on Lupron. This is obviously thinning his bones even more. He did see Dr. Michoacano Brunner and was prescribed Forteo. He states it was too expensive. The patient is quite wealthy so I am trying to convince him to at least reconsider getting the medication. My concern would be of fracture causing nursing home placement and eventually a downhill slide. Clinically he is euthyroid. His weight has been stable. His blood pressure here is well controlled. Patient Active Problem List   Diagnosis    Hypothyroidism    History of prostate cancer    [place] (hard of hearing)    History of DVT (deep vein thrombosis)    Hx of pulmonary embolus    Hypertensive disorder    Carotid artery stenosis    Chronic anticoagulation    Prostate cancer (HCC)    Arthritis    Other chest pain    Age-related osteoporosis without current pathological fracture       Allergies   Allergen Reactions    Eggs Or Egg-Derived Products Other (See Comments)     Violently ill for 1 week to 10 days. NO FLU SHOT    Sulfa Antibiotics Other (See Comments)     Weakness, violently ill       Medications listed as ordered at the time of discharge from hospital     Medication List            Accurate as of August 2, 2022 11:10 AM. If you have any questions, ask your nurse or doctor.                 CONTINUE taking these medications      acetaminophen 325 MG tablet  Commonly known as: TYLENOL     ascorbic acid 500 MG tablet  Commonly known as: VITAMIN C  Take 1 tablet by mouth daily     bicalutamide 50 MG chemo tablet  Commonly known as: CASODEX     calcitonin 200 UNIT/ACT nasal spray  Commonly known as: Miacalcin  1 spray by Nasal route daily     calcium carbonate 500 MG Tabs tablet  Commonly known as: OSCAL     diclofenac sodium 1 % Gel  Commonly known as: VOLTAREN  Apply 2 g topically 4 times daily     Eliquis 2.5 MG Tabs tablet  Generic drug: apixaban  TAKE 1 TABLET BY MOUTH TWICE DAILY     fish oil 1000 MG Caps     Handicap Placard Misc     leuprolide 45 MG injection  Commonly known as: LUPRON     levothyroxine 100 MCG tablet  Commonly known as: SYNTHROID  TAKE 1 TABLET BY MOUTH EVERY DAY     lisinopril 5 MG tablet  Commonly known as: PRINIVIL;ZESTRIL  TAKE 1 TABLET BY MOUTH DAILY     magnesium gluconate 500 MG tablet  Commonly known as: MAGONATE     PRESERVISION AREDS PO     SUPER B COMPLEX PO     teriparatide 620 MCG/2.48ML Sopn injection  Commonly known as: FORTEO  Inject 0.08 mLs into the skin daily     traMADol 50 MG tablet  Commonly known as: ULTRAM     turmeric 500 MG Caps     vitamin B-12 1000 MCG tablet  Commonly known as: CYANOCOBALAMIN     vitamin D 50 MCG (2000 UT) Tabs tablet  Commonly known as: CHOLECALCIFEROL  Take 1 tablet by mouth daily                Medications marked \"taking\" at this time  No outpatient medications have been marked as taking for the 8/2/22 encounter (Office Visit) with Aston Cortes MD.        Medications patient taking as of now reconciled against medications ordered at time of hospital discharge: Yes    Chief Complaint   Patient presents with    Other     4 months       History of Present illness -   Vitals:    08/02/22 1107   BP: 120/60   Pulse: 96   Temp: 98.5 °F (36.9 °C)   SpO2: 97%   Weight: 121 lb 6.4 oz (55.1 kg)     Body mass index is 18.46 kg/m². Wt Readings from Last 3 Encounters:   08/02/22 121 lb 6.4 oz (55.1 kg)   05/04/22 120 lb (54.4 kg)   04/18/22 120 lb (54.4 kg)     BP Readings from Last 3 Encounters:   08/02/22 120/60   05/04/22 102/62   04/18/22 80/61        Physical Exam:  Tympanic membranes are clear bilaterally. No anterior or posterior cervical adenopathy the lungs are clear to auscultation. Regular and is not tachycardic. He does have a 2 or 6 systolic ejection murmur abdomen positive bowel sounds soft nontender no masses no organomegaly. No CVA tenderness bilaterally. Pain with palpation over the left iliac crest posteriorly. No peripheral lymphedema.   Savanah Avilez was seen

## 2022-08-03 LAB — VITAMIN D 25-HYDROXY: 88 NG/ML (ref 30–100)

## 2022-08-03 RX ORDER — TERIPARATIDE 250 UG/ML
20 INJECTION, SOLUTION SUBCUTANEOUS DAILY
Qty: 7.44 ML | Refills: 1 | Status: SHIPPED | OUTPATIENT
Start: 2022-08-03

## 2022-08-04 ENCOUNTER — TELEPHONE (OUTPATIENT)
Dept: FAMILY MEDICINE CLINIC | Age: 87
End: 2022-08-04

## 2022-08-04 ENCOUNTER — OFFICE VISIT (OUTPATIENT)
Dept: FAMILY MEDICINE CLINIC | Age: 87
End: 2022-08-04
Payer: MEDICARE

## 2022-08-04 VITALS
BODY MASS INDEX: 18.4 KG/M2 | WEIGHT: 121 LBS | SYSTOLIC BLOOD PRESSURE: 102 MMHG | DIASTOLIC BLOOD PRESSURE: 68 MMHG | HEART RATE: 98 BPM | OXYGEN SATURATION: 99 % | TEMPERATURE: 97.1 F

## 2022-08-04 DIAGNOSIS — R31.9 URINARY TRACT INFECTION WITH HEMATURIA, SITE UNSPECIFIED: Primary | ICD-10-CM

## 2022-08-04 DIAGNOSIS — N39.0 URINARY TRACT INFECTION WITH HEMATURIA, SITE UNSPECIFIED: Primary | ICD-10-CM

## 2022-08-04 LAB
BILIRUBIN, POC: NORMAL
BLOOD URINE, POC: NORMAL
CLARITY, POC: NORMAL
COLOR, POC: YELLOW
GLUCOSE URINE, POC: NORMAL
KETONES, POC: NORMAL
LEUKOCYTE EST, POC: NORMAL
NITRITE, POC: NORMAL
PH, POC: 6
PROTEIN, POC: NORMAL
SPECIFIC GRAVITY, POC: 1.02
UROBILINOGEN, POC: 0.2

## 2022-08-04 PROCEDURE — 81002 URINALYSIS NONAUTO W/O SCOPE: CPT | Performed by: PHYSICIAN ASSISTANT

## 2022-08-04 PROCEDURE — 1123F ACP DISCUSS/DSCN MKR DOCD: CPT | Performed by: PHYSICIAN ASSISTANT

## 2022-08-04 PROCEDURE — 99214 OFFICE O/P EST MOD 30 MIN: CPT | Performed by: PHYSICIAN ASSISTANT

## 2022-08-04 RX ORDER — CEFDINIR 300 MG/1
300 CAPSULE ORAL 2 TIMES DAILY
Qty: 14 CAPSULE | Refills: 0 | Status: SHIPPED | OUTPATIENT
Start: 2022-08-04 | End: 2022-08-11

## 2022-08-04 NOTE — TELEPHONE ENCOUNTER
This was prescribed by the rheumatologist.  Is the only medication that reasonably able decrease his chances of having more fractures of his back. They can pay for the medication and get it injected every day, or they can pay for the hospitalization and nursing home when he fractures his back. It is their choice. I can only do what is medically warranted.

## 2022-08-04 NOTE — TELEPHONE ENCOUNTER
Sandy Prajapati calling about the GLENNA. ISonny Kumar script you sent in for him. They do not want to take this medication. It is too expensive, and she does not want him getting shots in his stomach with all his other problems.

## 2022-08-04 NOTE — PROGRESS NOTES
22  Nila Rai : 1928 Sex: male  Age 80 y.o. Subjective:  Chief Complaint   Patient presents with    Urinary Urgency         HPI:   Nila Rai , 80 y.o. male presents to express care for evaluation of possible UTI. The patient started with the symptoms over the last couple of days. The patient has had a difficult time urinating. He states that he has to take urinate but he only has a small amount that is able to be reproduced. The patient does have some burning associated. He is not really having much back pain or flank pain. The patient states that he always has some chronic back issues. The patient states that he does have a history of UTIs in the past.  The patient has had some confusion associated. He is not confused at this point. The patient is not having any chest pain, shortness of breath. No lightheadedness, dizziness. The patient is not currently on any antibiotics. ROS:   Unless otherwise stated in this report the patient's positive and negative responses for review of systems for constitutional, eyes, ENT, cardiovascular, respiratory, gastrointestinal, neurological, , musculoskeletal, and integument systems and related systems to the presenting problem are either stated in the history of present illness or were not pertinent or were negative for the symptoms and/or complaints related to the presenting medical problem. Positives and pertinent negatives as per HPI. All others reviewed and are negative.       PMH:     Past Medical History:   Diagnosis Date    History of prostate cancer 1970    radiation for 1 month and then seed implants     History of spinal fracture     [place] (hard of hearing)     Hypertension     Hypothyroidism 2004    Leg cramps     Prostate cancer ([address]     Pulmonary emboli ([address]        Past Surgical History:   Procedure Laterality Date    COLONOSCOPY      [place]    HAND TENDON SURGERY      HERNIA REPAIR  age 15    INGUINAL HERNIA REPAIR  6/7/12    RIGHT , WITH MESH    KNEE ARTHROSCOPY      MASTOID SURGERY      OTHER SURGICAL HISTORY  approx 8 yrs ago    prostate radiation and implanted seeds    OTHER SURGICAL HISTORY Right 04/30/2017    head laceration        Family History   Problem Relation Age of Onset    Heart Attack Daughter     Stroke Mother     Cancer Father         lung, smoking     Cancer Brother         throat, smoking        Medications:     Current Outpatient Medications:     cefdinir (OMNICEF) 300 MG capsule, Take 1 capsule by mouth in the morning and 1 capsule before bedtime. Do all this for 7 days. , Disp: 14 capsule, Rfl: 0    teriparatide (FORTEO) 620 MCG/2.48ML SOPN injection, Inject 0.08 mLs into the skin in the morning., Disp: 7.44 mL, Rfl: 1    levothyroxine (SYNTHROID) 100 MCG tablet, TAKE 1 TABLET BY MOUTH EVERY DAY, Disp: 90 tablet, Rfl: 1    lisinopril (PRINIVIL;ZESTRIL) 5 MG tablet, TAKE 1 TABLET BY MOUTH DAILY, Disp: 90 tablet, Rfl: 1    apixaban (ELIQUIS) 2.5 MG TABS tablet, TAKE 1 TABLET BY MOUTH TWICE DAILY, Disp: 60 tablet, Rfl: 5    ascorbic acid (VITAMIN C) 500 MG tablet, Take 1 tablet by mouth daily, Disp: 30 tablet, Rfl: 0    Vitamin D (CHOLECALCIFEROL) 50 MCG (2000 UT) TABS tablet, Take 1 tablet by mouth daily, Disp: 30 tablet, Rfl: 0    bicalutamide (CASODEX) 50 MG chemo tablet, Take 50 mg by mouth daily, Disp: , Rfl:     traMADol (ULTRAM) 50 MG tablet, Take 1 tablet by mouth 3 times daily.   (Patient not taking: Reported on 8/2/2022), Disp: , Rfl:     calcitonin (MIACALCIN) 200 UNIT/ACT nasal spray, 1 spray by Nasal route daily, Disp: 3.7 mL, Rfl: 1    vitamin B-12 (CYANOCOBALAMIN) 1000 MCG tablet, Take 1,000 mcg by mouth daily, Disp: , Rfl:     Multiple Vitamins-Minerals (PRESERVISION AREDS PO), Take by mouth daily, Disp: , Rfl:     Turmeric 500 MG CAPS, Take by mouth daily  (Patient not taking: Reported on 8/2/2022), Disp: , Rfl:     Omega-3 Fatty Acids (FISH OIL) 1000 MG CAPS, Take 1,000 mg by mouth daily , Disp: , Rfl:     diclofenac sodium (VOLTAREN) 1 % GEL, Apply 2 g topically 4 times daily, Disp: 1 Tube, Rfl: 0    Handicap Placard MISC, by Does not apply route PERMANENT, Disp: , Rfl:     calcium carbonate (OSCAL) 500 MG TABS tablet, Take 500 mg by mouth daily , Disp: , Rfl:     B Complex-C (SUPER B COMPLEX PO), Take by mouth, Disp: , Rfl:     magnesium gluconate (MAGONATE) 500 MG tablet, Take 500 mg by mouth 2 times daily , Disp: , Rfl:     leuprolide (LUPRON) 45 MG injection, Inject 45 mg into the muscle every 6 months , Disp: , Rfl:     acetaminophen (TYLENOL) 325 MG tablet, Take 650 mg by mouth every 6 hours as needed for Pain , Disp: , Rfl:     Allergies: Allergies   Allergen Reactions    Eggs Or Egg-Derived Products Other (See Comments)     Violently ill for 1 week to 10 days. NO FLU SHOT    Sulfa Antibiotics Other (See Comments)     Weakness, violently ill       Social History:     Social History     Tobacco Use    Smoking status: Never    Smokeless tobacco: Never   Vaping Use    Vaping Use: Never used   Substance Use Topics    Alcohol use: Yes     Comment: socially    Drug use: No       Patient lives at home. Physical Exam:     Vitals:    08/04/22 1106   BP: 102/68   Site: Right Upper Arm   Position: Sitting   Pulse: 98   Temp: 97.1 °F (36.2 °C)   TempSrc: Temporal   SpO2: 99%   Weight: 121 lb (54.9 kg)       Exam:    Nurses note and vital signs reviewed and patient is not hypoxic. General: The patient appears well and in no apparent distress. Patient is resting comfortably on cart. Skin: Warm, dry, no pallor noted. There is no rash noted. Head: Normocephalic, atraumatic. Eye: Normal conjunctiva  Ears, Nose, Mouth, and Throat: wearing a mask  Cardiovascular: Regular Rate and Rhythm  Respiratory: Patient is in no distress, no accessory muscle use, lungs are clear to auscultation, no wheezing, crackles or rhonchi  Back: Non-tender, no CVA tenderness bilaterally to percussion.   GI: Normal bowel sounds, no tenderness to palpation, no masses appreciated. No rebound, guarding, or rigidity noted. Neurological: A&O x4, normal speech        Testing:     Results for orders placed or performed in visit on 08/04/22   POCT Urinalysis no Micro   Result Value Ref Range    Color, UA yellow     Clarity, UA cloudy     Glucose, UA POC neg     Bilirubin, UA small     Ketones, UA neg     Spec Grav, UA 1.020     Blood, UA POC large     pH, UA 6.0     Protein, UA  mg     Urobilinogen, UA 0.2     Leukocytes, UA moderate     Nitrite, UA neg            Medical Decision Making:     Vital signs reviewed    Past medical history reviewed. Allergies reviewed. Medications reviewed. Patient on arrival does not appear to be in any apparent distress or discomfort. The patient is noted to be afebrile and nontoxic appearing. The patient had UA that did show evidence of a UTI. We did obtain a urine culture, which was sent to the lab for further evaluation. The patient's urinalysis does show moderate leukocytes, large blood. There is small bilirubin present. It is cloudy. We set up a urine culture. We will treat the patient with cefdinir. Culture is pending at this time. We will have the patient monitor symptoms closely. We will have the patient follow-up. The patient did not provide us with enough urinalysis for a urine culture however the patient will continue to try to hydrate and bring us back a sample so we can send off for culture. The patient is to follow up with PCP for results and we will make any necessary adjustments to the patient's medication regimen. The patient will go directly to ED if notes nausea, vomiting, back pain, fever, chills or worsening symptoms. The patient has no other concerns at this time. Clinical Impression:   Artis Primrose was seen today for urinary urgency.     Diagnoses and all orders for this visit:    Urinary tract infection with hematuria, site unspecified  -     POCT Urinalysis no Micro    Other orders  -     cefdinir (OMNICEF) 300 MG capsule; Take 1 capsule by mouth in the morning and 1 capsule before bedtime. Do all this for 7 days. The patient is to call for any concerns or return if any of the signs or symptoms worsen. The patient is to follow-up with PCP in the next 2-3 days for repeat evaluation repeat assessment or go directly to the emergency department.      SIGNATURE: Anthony Barnhart III, PA-C

## 2022-08-05 NOTE — TELEPHONE ENCOUNTER
I expressed your concerns with Jagdish Arnold. She is understanding the response and taking it seriously. However, she is asking if there is an option of using one other injectables that he could try that is not given as often?

## 2022-08-05 NOTE — TELEPHONE ENCOUNTER
Unfortunately this is the only medication is available and it is given daily. The other medications that are injectables do not stimulate osteoblast.  That is the medication that is needed.   This is the only option

## 2022-08-05 NOTE — TELEPHONE ENCOUNTER
Chelle Cook of the response. She is agreeable to getting the medication and making sure he gets his shot every day.

## 2022-10-24 ENCOUNTER — OFFICE VISIT (OUTPATIENT)
Dept: ENT CLINIC | Age: 87
End: 2022-10-24
Payer: MEDICARE

## 2022-10-24 VITALS
BODY MASS INDEX: 19.1 KG/M2 | TEMPERATURE: 97.2 F | OXYGEN SATURATION: 99 % | SYSTOLIC BLOOD PRESSURE: 107 MMHG | WEIGHT: 126 LBS | HEIGHT: 68 IN | DIASTOLIC BLOOD PRESSURE: 59 MMHG | HEART RATE: 58 BPM

## 2022-10-24 DIAGNOSIS — H61.23 EXCESSIVE CERUMEN IN BOTH EAR CANALS: Primary | ICD-10-CM

## 2022-10-24 PROCEDURE — 1123F ACP DISCUSS/DSCN MKR DOCD: CPT

## 2022-10-24 PROCEDURE — 99212 OFFICE O/P EST SF 10 MIN: CPT

## 2022-10-24 PROCEDURE — 69210 REMOVE IMPACTED EAR WAX UNI: CPT

## 2022-10-24 NOTE — PROGRESS NOTES
Subjective:      Patient ID:  Sindhu Downey is a 80 y.o. male. HPI:    Pt presents with a history of cerumen impaction removal.   The patients ear was last cleaned 6 month(s) ago. The patient was using ear drops to loosen wax immediately prior to this visit. Hearing aids: yes    Past Medical History:   Diagnosis Date    History of prostate cancer 1970    radiation for 1 month and then seed implants     History of spinal fracture     [place] (hard of hearing)     Hypertension     Hypothyroidism 2004    Leg cramps     Prostate cancer ([address]     Pulmonary emboli (HCC)      Past Surgical History:   Procedure Laterality Date    COLONOSCOPY  2014    [place]    HAND TENDON SURGERY      HERNIA REPAIR  age 15    INGUINAL HERNIA REPAIR  6/7/12    RIGHT , WITH MESH    KNEE ARTHROSCOPY      MASTOID SURGERY      OTHER SURGICAL HISTORY  approx 8 yrs ago    prostate radiation and implanted seeds    OTHER SURGICAL HISTORY Right 04/30/2017    head laceration      Family History   Problem Relation Age of Onset    Heart Attack Daughter     Stroke Mother     Cancer Father         lung, smoking     Cancer Brother         throat, smoking      Social History     Socioeconomic History    Marital status:      Spouse name: None    Number of children: None    Years of education: None    Highest education level: None   Occupational History    Occupation: business owner   Tobacco Use    Smoking status: Never    Smokeless tobacco: Never   Vaping Use    Vaping Use: Never used   Substance and Sexual Activity    Alcohol use: Yes     Comment: socially    Drug use: No    Sexual activity: Not Currently     Partners: Female   Social History Narrative    ** Merged History Encounter **    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 He has been on the same location on [address] for many years. His wife has had a stroke and he is the primary caregiver.      Allergies   Allergen Reactions    Eggs Or Egg-Derived Products Other (See Comments)     Violently ill for 1 week to 10 days. NO FLU SHOT    Sulfa Antibiotics Other (See Comments)     Weakness, violently ill       Review of Systems   HENT:  Positive for hearing loss (Bilateral Hearing Aids). Negative for ear discharge and ear pain. Objective:     Vitals:    10/24/22 1035   BP: (!) 107/59   Pulse: 58   Temp: 97.2 °F (36.2 °C)   SpO2: 99%     Physical Exam  HENT:      Right Ear: Tympanic membrane and external ear normal. Decreased hearing noted. There is impacted cerumen. Left Ear: Tympanic membrane normal. Decreased hearing noted. There is impacted cerumen. Ears:       Cerumen removal   Auditory canal(s) both ears partially obstructed with cerumen. A microscope was used due to deep impaction of the cerumen. Cerumen was gently removed using soft plastic curette, suction. Tympanic membranes are intact following the procedure. Auditory canals appear normal.        Assessment:       Diagnosis Orders   1. Excessive cerumen in both ear canals            Plan:      Arias Melendez was seen for a 6-month recheck of impacted cerumen. On examination bilateral EACs were noted to have partially obstructive impacted cerumen. As noted above a bilateral cerumen removal was completed. The patient tolerated the procedure well. Bilateral TMs and EACs appeared normal upon removal of cerumen. The patient did have a noted left earlobe lesion which he states he has an appointment for in the future. The patient will be seen in 6 months for reevaluation of impacted cerumen. They are instructed to call for any new or worsening symptoms prior to their next appointment. Follow up in 6 month(s)    Eddie Eller.  Jeremi Costa MSN, FNP-BC  8 [facility], Nose and Throat    The information contained in this note has been dictated using drug and medical speech recognition software and may contain errors

## 2022-11-07 ENCOUNTER — OFFICE VISIT (OUTPATIENT)
Dept: RHEUMATOLOGY | Age: 87
End: 2022-11-07
Payer: MEDICARE

## 2022-11-07 VITALS
RESPIRATION RATE: 16 BRPM | DIASTOLIC BLOOD PRESSURE: 64 MMHG | SYSTOLIC BLOOD PRESSURE: 108 MMHG | WEIGHT: 120 LBS | BODY MASS INDEX: 18.19 KG/M2 | OXYGEN SATURATION: 97 % | HEART RATE: 62 BPM | HEIGHT: 68 IN

## 2022-11-07 DIAGNOSIS — M81.0 AGE-RELATED OSTEOPOROSIS WITHOUT CURRENT PATHOLOGICAL FRACTURE: Primary | ICD-10-CM

## 2022-11-07 DIAGNOSIS — M15.9 GENERALIZED OSTEOARTHRITIS: ICD-10-CM

## 2022-11-07 DIAGNOSIS — Z51.81 MEDICATION MONITORING ENCOUNTER: ICD-10-CM

## 2022-11-07 PROCEDURE — 1123F ACP DISCUSS/DSCN MKR DOCD: CPT | Performed by: INTERNAL MEDICINE

## 2022-11-07 PROCEDURE — 99214 OFFICE O/P EST MOD 30 MIN: CPT | Performed by: INTERNAL MEDICINE

## 2022-11-07 RX ORDER — ONDANSETRON 2 MG/ML
8 INJECTION INTRAMUSCULAR; INTRAVENOUS
OUTPATIENT
Start: 2022-11-07

## 2022-11-07 RX ORDER — DIPHENHYDRAMINE HYDROCHLORIDE 50 MG/ML
50 INJECTION INTRAMUSCULAR; INTRAVENOUS
OUTPATIENT
Start: 2022-11-07

## 2022-11-07 RX ORDER — ALBUTEROL SULFATE 90 UG/1
4 AEROSOL, METERED RESPIRATORY (INHALATION) PRN
OUTPATIENT
Start: 2022-11-07

## 2022-11-07 RX ORDER — EPINEPHRINE 1 MG/ML
0.3 INJECTION, SOLUTION, CONCENTRATE INTRAVENOUS PRN
OUTPATIENT
Start: 2022-11-07

## 2022-11-07 RX ORDER — ACETAMINOPHEN 325 MG/1
650 TABLET ORAL
OUTPATIENT
Start: 2022-11-07

## 2022-11-07 RX ORDER — SODIUM CHLORIDE 9 MG/ML
INJECTION, SOLUTION INTRAVENOUS CONTINUOUS
OUTPATIENT
Start: 2022-11-07

## 2022-11-07 RX ORDER — FAMOTIDINE 10 MG/ML
20 INJECTION, SOLUTION INTRAVENOUS
OUTPATIENT
Start: 2022-11-07

## 2022-11-07 ASSESSMENT — ENCOUNTER SYMPTOMS
DIARRHEA: 0
NAUSEA: 0
COLOR CHANGE: 0
TROUBLE SWALLOWING: 0
BACK PAIN: 1
COUGH: 0
VOMITING: 0
ABDOMINAL PAIN: 0
SHORTNESS OF BREATH: 0

## 2022-11-07 NOTE — PROGRESS NOTES
Kasie Millan 1/7/1928 is a 80 y.o. male, here for evaluation of the following chief complaint(s):  Follow-up (Patient here for follow up on osteoporosis. )         ASSESSMENT/PLAN:    Kasie Millan 1/7/1928 is a 80 y.o. male seen in follow-up for osteoporosis. 1.  Osteoporosis-he has history of compression fracture. On bone density scan from February 2022 his bones are very thin. He has a lowest T score of -5.8 in the forearm and -4.3 in the hip. Given severe osteoporosis planned to put him on Forteo. Unfortunately his co-pay was too high and he would not qualify for patient assistance. We will try him on Prolia. We discussed the risks, benefits, side effects. 2.  Medication monitoring-we will update calcium level and vitamin D.    3.  Osteoarthritis-avoid systemic NSAIDs due to Eliquis. 1. Age-related osteoporosis without current pathological fracture  -     Comprehensive Metabolic Panel; Future  -     Vitamin D 25 Hydroxy; Future  2. Medication monitoring encounter  3. Generalized osteoarthritis      Return in about 6 months (around 5/7/2023). Subjective   SUBJECTIVE/OBJECTIVE:    HPI: Kasie Millan 1/7/1928 is a 80 y.o. male seen in follow-up for severe osteoporosis. Last visit the plan was to put him on Forteo. Unfortunately his co-pay was too high and he states he would not qualify for patient assistance. Fortunately he has not had any falls or fractures since last visit. Initial history: Patient has a history of compression fracture. He has never had a hip fracture. Bone density scan from February 2022 shows a lowest T score of -5.8 in the forearm and -4.3 in the hip. He has never been on treatment for osteoporosis apparently. He does take vitamin D. He complains of some back pain but otherwise feels well.     Past Medical History:   Diagnosis Date    History of prostate cancer 1970    radiation for 1 month and then seed implants     History of spinal fracture     [place] (hard of hearing)     Hypertension     Hypothyroidism 2004    Leg cramps     Prostate cancer ([facility] [address]     Pulmonary emboli (HCC)         Review of Systems   Constitutional:  Negative for fatigue and fever. HENT:  Negative for mouth sores and trouble swallowing. Respiratory:  Negative for cough and shortness of breath. Cardiovascular:  Negative for chest pain. Gastrointestinal:  Negative for abdominal pain, diarrhea, nausea and vomiting. Genitourinary:  Negative for dysuria and hematuria. Musculoskeletal:  Positive for arthralgias and back pain. Negative for joint swelling. Skin:  Negative for color change and rash. Neurological:  Negative for weakness and numbness. Hematological:  Negative for adenopathy. All other systems reviewed and are negative. Objective   Vitals:    11/07/22 1044   BP: 108/64   Pulse: 62   Resp: 16   SpO2: 97%      Physical Exam  Constitutional:       General: He is not in acute distress. Appearance: Normal appearance. HENT:      Head: Normocephalic and atraumatic. Nose: Nose normal.   Eyes:      General: No scleral icterus. Pulmonary:      Effort: Pulmonary effort is normal.   Musculoskeletal:         General: No swelling or deformity. Skin:     General: Skin is warm and dry. Findings: No rash. Neurological:      General: No focal deficit present. Mental Status: He is alert and oriented to person, place, and time. Mental status is at baseline.    Psychiatric:         Mood and Affect: Mood normal.         Behavior: Behavior normal.          Lab Results   Component Value Date    WBC 7.0 08/02/2022    HGB 13.3 08/02/2022    HCT 42.3 08/02/2022    MCV 93.0 08/02/2022     08/02/2022     Lab Results   Component Value Date     08/02/2022    K 4.4 08/02/2022     08/02/2022    CO2 25 08/02/2022    BUN 25 (H) 08/02/2022    CREATININE 0.9 08/02/2022    GLUCOSE 122 (H) 08/02/2022    CALCIUM 9.2 08/02/2022    PROT 6.6 08/02/2022    LABALBU 3.9 08/02/2022    BILITOT 0.3 08/02/2022    ALKPHOS 106 08/02/2022    AST 16 08/02/2022    ALT <5 08/02/2022    LABGLOM >60 08/02/2022    GFRAA >60 08/02/2022     No results found for: APARNA  No results found for: RHEUMFACTOR  No results found for: SEDRATE  No results found for: CRP         An electronic signature was used to authenticate this note. This note was generated with a voice recognition dictation system. Please disregard any errors or omission which have escaped my review.     --Mariza Chow, DO

## 2022-11-09 ENCOUNTER — OFFICE VISIT (OUTPATIENT)
Dept: FAMILY MEDICINE CLINIC | Age: 87
End: 2022-11-09
Payer: MEDICARE

## 2022-11-09 VITALS
DIASTOLIC BLOOD PRESSURE: 52 MMHG | HEART RATE: 93 BPM | TEMPERATURE: 97.4 F | SYSTOLIC BLOOD PRESSURE: 106 MMHG | OXYGEN SATURATION: 97 % | BODY MASS INDEX: 19.01 KG/M2 | WEIGHT: 125 LBS

## 2022-11-09 DIAGNOSIS — Z79.01 CHRONIC ANTICOAGULATION: ICD-10-CM

## 2022-11-09 DIAGNOSIS — M80.80XD OTHER OSTEOPOROSIS WITH CURRENT PATHOLOGICAL FRACTURE WITH ROUTINE HEALING, SUBSEQUENT ENCOUNTER: ICD-10-CM

## 2022-11-09 DIAGNOSIS — M80.80XD OTHER OSTEOPOROSIS WITH CURRENT PATHOLOGICAL FRACTURE WITH ROUTINE HEALING, SUBSEQUENT ENCOUNTER: Primary | ICD-10-CM

## 2022-11-09 DIAGNOSIS — M81.0 AGE-RELATED OSTEOPOROSIS WITHOUT CURRENT PATHOLOGICAL FRACTURE: ICD-10-CM

## 2022-11-09 LAB
ALBUMIN SERPL-MCNC: 3.8 G/DL (ref 3.5–5.2)
ALP BLD-CCNC: 101 U/L (ref 40–129)
ALT SERPL-CCNC: <5 U/L (ref 0–40)
ANION GAP SERPL CALCULATED.3IONS-SCNC: 14 MMOL/L (ref 7–16)
AST SERPL-CCNC: 24 U/L (ref 0–39)
BASOPHILS ABSOLUTE: 0.03 E9/L (ref 0–0.2)
BASOPHILS RELATIVE PERCENT: 0.6 % (ref 0–2)
BILIRUB SERPL-MCNC: 0.2 MG/DL (ref 0–1.2)
BUN BLDV-MCNC: 18 MG/DL (ref 6–23)
CALCIUM SERPL-MCNC: 9.8 MG/DL (ref 8.6–10.2)
CHLORIDE BLD-SCNC: 104 MMOL/L (ref 98–107)
CO2: 23 MMOL/L (ref 22–29)
CREAT SERPL-MCNC: 0.8 MG/DL (ref 0.7–1.2)
EOSINOPHILS ABSOLUTE: 0.17 E9/L (ref 0.05–0.5)
EOSINOPHILS RELATIVE PERCENT: 3.4 % (ref 0–6)
GFR SERPL CREATININE-BSD FRML MDRD: >60 ML/MIN/1.73
GLUCOSE BLD-MCNC: 63 MG/DL (ref 74–99)
HCT VFR BLD CALC: 41.1 % (ref 37–54)
HEMOGLOBIN: 12.9 G/DL (ref 12.5–16.5)
IMMATURE GRANULOCYTES #: 0.01 E9/L
IMMATURE GRANULOCYTES %: 0.2 % (ref 0–5)
LYMPHOCYTES ABSOLUTE: 1.37 E9/L (ref 1.5–4)
LYMPHOCYTES RELATIVE PERCENT: 27 % (ref 20–42)
MCH RBC QN AUTO: 28.7 PG (ref 26–35)
MCHC RBC AUTO-ENTMCNC: 31.4 % (ref 32–34.5)
MCV RBC AUTO: 91.5 FL (ref 80–99.9)
MONOCYTES ABSOLUTE: 0.54 E9/L (ref 0.1–0.95)
MONOCYTES RELATIVE PERCENT: 10.7 % (ref 2–12)
NEUTROPHILS ABSOLUTE: 2.95 E9/L (ref 1.8–7.3)
NEUTROPHILS RELATIVE PERCENT: 58.1 % (ref 43–80)
PDW BLD-RTO: 15.3 FL (ref 11.5–15)
PLATELET # BLD: 233 E9/L (ref 130–450)
PMV BLD AUTO: 9.6 FL (ref 7–12)
POTASSIUM SERPL-SCNC: 4.9 MMOL/L (ref 3.5–5)
RBC # BLD: 4.49 E12/L (ref 3.8–5.8)
SODIUM BLD-SCNC: 141 MMOL/L (ref 132–146)
TOTAL PROTEIN: 6.8 G/DL (ref 6.4–8.3)
WBC # BLD: 5.1 E9/L (ref 4.5–11.5)

## 2022-11-09 PROCEDURE — 99213 OFFICE O/P EST LOW 20 MIN: CPT | Performed by: STUDENT IN AN ORGANIZED HEALTH CARE EDUCATION/TRAINING PROGRAM

## 2022-11-09 PROCEDURE — 1123F ACP DISCUSS/DSCN MKR DOCD: CPT | Performed by: STUDENT IN AN ORGANIZED HEALTH CARE EDUCATION/TRAINING PROGRAM

## 2022-11-09 ASSESSMENT — PATIENT HEALTH QUESTIONNAIRE - PHQ9
SUM OF ALL RESPONSES TO PHQ QUESTIONS 1-9: 0
SUM OF ALL RESPONSES TO PHQ QUESTIONS 1-9: 0
2. FEELING DOWN, DEPRESSED OR HOPELESS: 0
1. LITTLE INTEREST OR PLEASURE IN DOING THINGS: 0
SUM OF ALL RESPONSES TO PHQ9 QUESTIONS 1 & 2: 0
SUM OF ALL RESPONSES TO PHQ QUESTIONS 1-9: 0
SUM OF ALL RESPONSES TO PHQ QUESTIONS 1-9: 0

## 2022-11-09 ASSESSMENT — ENCOUNTER SYMPTOMS
NAUSEA: 0
RHINORRHEA: 0
ABDOMINAL PAIN: 0
BACK PAIN: 1
COUGH: 0
VOMITING: 0
SHORTNESS OF BREATH: 0

## 2022-11-09 NOTE — PROGRESS NOTES
[facility]  Office Progress Note  Dr. Gama Malik      Patient:  April Haas 80 y.o. male     Date of Service: 11/9/22      Chief complaint:   Chief Complaint   Patient presents with    3 Month Follow-Up     Chronic back pain         History of Present Illness   The patient is a 80 y.o. male  here to follow up of their chronic back pain for osteoporosis. This was supposed to be a visit to follow up on his osteoporosis/treatment with forteo, but it was not covered by insurance. Was seen by rheum. Prolia was ordered and they are awaiting a call to schedule. Plan is to obtain vitamin D and calcium level. Otherwise his chronic conditions are stable and he voices no new complaints. History of DVTs, on eliquis. No bleeding issues    His blood pressure is on the lower side. I asked in depth questions about balance, falls, light headedness or dizziness. He has none of these. He uses a walker. His care giver was present with him so states she observes him frequently and does not notice any imbalance or potential falls. Past Medical History:      Diagnosis Date    History of prostate cancer 1970    radiation for 1 month and then seed implants     History of spinal fracture     [place] (hard of hearing)     Hypertension     Hypothyroidism 2004    Leg cramps     Prostate cancer ([facility] [address]     Pulmonary emboli (HCC)        Review of Systems:   Review of Systems   Constitutional:  Negative for chills and fever. HENT:  Negative for congestion and rhinorrhea. Respiratory:  Negative for cough and shortness of breath. Cardiovascular:  Negative for chest pain and leg swelling. Gastrointestinal:  Negative for abdominal pain, nausea and vomiting. Genitourinary:  Negative for dysuria and hematuria. Musculoskeletal:  Positive for back pain. Negative for arthralgias and myalgias. Skin:  Negative for rash and wound. Neurological:  Negative for dizziness and light-headedness.      Physical Exam   Vitals: BP (!) 106/52   Pulse 93   Temp 97.4 °F (36.3 °C)   Wt 125 lb (56.7 kg)   SpO2 97%   BMI 19.01 kg/m²   Physical Exam    General:   No apparent distress. HEENT:  Normocephalic, atraumatic. No scleral icterus. No conjunctival injection. No nasal discharge. Heart:  RRR, no murmurs, gallops, or rubs  Lungs:  CTA bilaterally, bilat symmetrical expansion, no wheeze, rales, or rhonchi  Extremities:  No clubbing, cyanosis, or edema  Neuro:  Alert and oriented x3, no focal deficits      Assessment and Plan     Keep follow up with ortho, await to hear if prolia is covered and scheduled. Contact rheum if not. Continue current medications. NO light headed, dizziness or near falls recently. Follow up with pcp in 6 months. Wants to return after rheum visit. 1. Other osteoporosis with current pathological fracture with routine healing, subsequent encounter    - CBC with Auto Differential; Future    2. Chronic anticoagulation    - CBC with Auto Differential; Future    Counseled regarding above diagnosis, including possible risks and complications,  especially if left uncontrolled. Counseled regarding the possible side effects, risks, benefits and alternatives to treatment;patient and/or guardian verbalizes understanding, agrees, feels comfortable with and wishes to proceed with above treatment plan. Call or go to ED immediately if symptoms worsen or persist. Advised patient to call with any new medication issues, and, as applicable, read all Rx info from pharmacy to assure aware of all possible risks and side effects of medicationbefore taking. Patient and/or guardian given opportunity to ask questions/raise concerns. The patient verbalized comfort and understanding ofinstructions. Return to Office: Return in about 6 months (around 5/9/2023) for Medication Check, Blood Pressure Check.     Medication List:    Current Outpatient Medications   Medication Sig Dispense Refill    levothyroxine (SYNTHROID) 100 MCG tablet TAKE 1 TABLET BY MOUTH EVERY DAY 90 tablet 1    lisinopril (PRINIVIL;ZESTRIL) 5 MG tablet TAKE 1 TABLET BY MOUTH DAILY 90 tablet 1    apixaban (ELIQUIS) 2.5 MG TABS tablet TAKE 1 TABLET BY MOUTH TWICE DAILY 60 tablet 5    ascorbic acid (VITAMIN C) 500 MG tablet Take 1 tablet by mouth daily 30 tablet 0    bicalutamide (CASODEX) 50 MG chemo tablet Take 50 mg by mouth daily      traMADol (ULTRAM) 50 MG tablet Take 1 tablet by mouth 3 times daily. calcitonin (MIACALCIN) 200 UNIT/ACT nasal spray 1 spray by Nasal route daily 3.7 mL 1    vitamin B-12 (CYANOCOBALAMIN) 1000 MCG tablet Take 1,000 mcg by mouth daily      Multiple Vitamins-Minerals (PRESERVISION AREDS PO) Take by mouth daily      Turmeric 500 MG CAPS Take by mouth daily      Omega-3 Fatty Acids (FISH OIL) 1000 MG CAPS Take 1,000 mg by mouth daily       diclofenac sodium (VOLTAREN) 1 % GEL Apply 2 g topically 4 times daily 1 Tube 0    Handicap Placard MISC by Does not apply route PERMANENT      calcium carbonate (OSCAL) 500 MG TABS tablet Take 500 mg by mouth daily       B Complex-C (SUPER B COMPLEX PO) Take by mouth      magnesium gluconate (MAGONATE) 500 MG tablet Take 500 mg by mouth 2 times daily       leuprolide (LUPRON) 45 MG injection Inject 45 mg into the muscle every 6 months       acetaminophen (TYLENOL) 325 MG tablet Take 650 mg by mouth every 6 hours as needed for Pain       Vitamin D (CHOLECALCIFEROL) 50 MCG (2000 UT) TABS tablet Take 1 tablet by mouth daily 30 tablet 0     No current facility-administered medications for this visit. Tiffany Meraz MD     This document may have been prepared at least partially through the use of voice recognition software. Although effort is taken to assure the accuracy ofthis document, it is possible that grammatical, syntax, or spelling errors may occur.

## 2022-11-10 LAB — VITAMIN D 25-HYDROXY: 77 NG/ML (ref 30–100)

## 2022-11-10 NOTE — PROGRESS NOTES
Left detailed message on caregivers phone informing of d'r smessage.     Electronically signed by Ramandeep Meraz MA on 11/10/2022 at 2:38 PM

## 2022-11-17 ENCOUNTER — TELEPHONE (OUTPATIENT)
Dept: PRIMARY CARE CLINIC | Age: 87
End: 2022-11-17

## 2022-11-17 NOTE — TELEPHONE ENCOUNTER
The dentist office (Dr Alka Londono) called asking if patient needs to be off his eliquis for getting a tooth pulled. If so They need to know how many days before  and after.    She said they will be waiting on a call at   [phone] can let them know or can fax information to [phone]

## 2022-11-23 ENCOUNTER — TELEPHONE (OUTPATIENT)
Dept: FAMILY MEDICINE CLINIC | Age: 87
End: 2022-11-23

## 2022-11-23 NOTE — TELEPHONE ENCOUNTER
----- Message from Carmenza Tran MD sent at 11/10/2022  6:58 AM EST -----  Blood counts ordered by myself are normal. Blood work ordered by rheumatology overall looks unremarkable as well but will allow them to comment on it themselves Patient Information     Patient Name MRN Karen Randle [id number] Female 1946      Telephone Encounter by Susana Mcrae at 2017  1:17 PM     Author:  Susana Mcrae Service:  (none) Author Type:  (none)     Filed:  2017  1:18 PM Encounter Date:  2017 Status:  Signed     :  Susana Mcrae            [facility] PATIENT SAID HER WOUND HAS BECOME INFECTED . SHE WOULD LIKE A CALL AS SOON AS POSSIBLE FROM [facility] NURSE.

## 2022-11-23 NOTE — LETTER
[address]  Phone: [phone]  Fax: [phone]    Beatrice Corrales MD        November 23, 2022     [facility] [address] [facility] [address]      Dear Arias Melendez:    Below are the results from your recent visit:    Resulted Orders   CBC with Auto Differential   Result Value Ref Range    WBC 5.1 4.5 - 11.5 E9/L    RBC 4.49 3.80 - 5.80 E12/L    Hemoglobin 12.9 12.5 - 16.5 g/dL    Hematocrit 41.1 37.0 - 54.0 %    MCV 91.5 80.0 - 99.9 fL    MCH 28.7 26.0 - 35.0 pg    MCHC 31.4 (L) 32.0 - 34.5 %    RDW 15.3 (H) 11.5 - 15.0 fL    Platelets 177 700 - 865 E9/L    MPV 9.6 7.0 - 12.0 fL    Neutrophils % 58.1 43.0 - 80.0 %    Immature Granulocytes % 0.2 0.0 - 5.0 %    Lymphocytes % 27.0 20.0 - 42.0 %    Monocytes % 10.7 2.0 - 12.0 %    Eosinophils % 3.4 0.0 - 6.0 %    Basophils % 0.6 0.0 - 2.0 %    Neutrophils Absolute 2.95 1.80 - 7.30 E9/L    Immature Granulocytes # 0.01 E9/L    Lymphocytes Absolute 1.37 (L) 1.50 - 4.00 E9/L    Monocytes Absolute 0.54 0.10 - 0.95 E9/L    Eosinophils Absolute 0.17 0.05 - 0.50 E9/L    Basophils Absolute 0.03 0.00 - 0.20 E9/L             Blood counts ordered by myself are normal. Blood work ordered by rheumatology overall looks unremarkable as well but will allow them to comment on it themselves. If you have any questions or concerns, please don't hesitate to call.     Sincerely,        Beatrice Corrales MD

## 2022-12-08 ENCOUNTER — TELEPHONE (OUTPATIENT)
Dept: RHEUMATOLOGY | Age: 87
End: 2022-12-08

## 2022-12-08 NOTE — TELEPHONE ENCOUNTER
Left detailed message on Megha's phone (care giver) to contact the [facility] infusion center to setup Prolia injections. Patient's has not been contact to setup the appointment to receive his prolia.       Electronically signed by Elvia Morales CMA on 12/8/2022 at 9:09 AM

## 2022-12-12 NOTE — TELEPHONE ENCOUNTER
Ara Ferreira called and stated that Alma Wilson needs to put an order in for a Calcium draw so that he can get his Prolia shot on 12/28. She would like a call back when this is in.

## 2022-12-20 DIAGNOSIS — Z79.899 HIGH RISK MEDICATION USE: ICD-10-CM

## 2022-12-20 LAB
ANION GAP SERPL CALCULATED.3IONS-SCNC: 10 MMOL/L (ref 7–16)
BUN BLDV-MCNC: 19 MG/DL (ref 6–23)
CALCIUM SERPL-MCNC: 9.9 MG/DL (ref 8.6–10.2)
CHLORIDE BLD-SCNC: 101 MMOL/L (ref 98–107)
CO2: 26 MMOL/L (ref 22–29)
CREAT SERPL-MCNC: 0.8 MG/DL (ref 0.7–1.2)
GFR SERPL CREATININE-BSD FRML MDRD: >60 ML/MIN/1.73
GLUCOSE BLD-MCNC: 101 MG/DL (ref 74–99)
POTASSIUM SERPL-SCNC: 4.6 MMOL/L (ref 3.5–5)
SODIUM BLD-SCNC: 137 MMOL/L (ref 132–146)

## 2022-12-28 ENCOUNTER — HOSPITAL ENCOUNTER (OUTPATIENT)
Dept: INFUSION THERAPY | Age: 87
Setting detail: INFUSION SERIES
Discharge: HOME | End: 2022-12-28
Payer: MEDICARE

## 2022-12-28 VITALS
RESPIRATION RATE: 18 BRPM | BODY MASS INDEX: 18.19 KG/M2 | HEIGHT: 68 IN | TEMPERATURE: 96.8 F | SYSTOLIC BLOOD PRESSURE: 98 MMHG | HEART RATE: 72 BPM | OXYGEN SATURATION: 99 % | DIASTOLIC BLOOD PRESSURE: 44 MMHG | WEIGHT: 120 LBS

## 2022-12-28 DIAGNOSIS — M81.0 AGE-RELATED OSTEOPOROSIS WITHOUT CURRENT PATHOLOGICAL FRACTURE: Primary | ICD-10-CM

## 2022-12-28 PROCEDURE — 6360000002 HC RX W HCPCS: Performed by: INTERNAL MEDICINE

## 2022-12-28 PROCEDURE — 96372 THER/PROPH/DIAG INJ SC/IM: CPT

## 2022-12-28 RX ORDER — SODIUM CHLORIDE 9 MG/ML
INJECTION, SOLUTION INTRAVENOUS CONTINUOUS
OUTPATIENT
Start: 2023-05-10

## 2022-12-28 RX ORDER — ALBUTEROL SULFATE 90 UG/1
4 AEROSOL, METERED RESPIRATORY (INHALATION) PRN
OUTPATIENT
Start: 2023-05-10

## 2022-12-28 RX ORDER — ACETAMINOPHEN 325 MG/1
650 TABLET ORAL
OUTPATIENT
Start: 2023-05-10

## 2022-12-28 RX ORDER — DIPHENHYDRAMINE HYDROCHLORIDE 50 MG/ML
50 INJECTION INTRAMUSCULAR; INTRAVENOUS
OUTPATIENT
Start: 2023-05-10

## 2022-12-28 RX ORDER — ONDANSETRON 2 MG/ML
8 INJECTION INTRAMUSCULAR; INTRAVENOUS
OUTPATIENT
Start: 2023-05-10

## 2022-12-28 RX ORDER — EPINEPHRINE 1 MG/ML
0.3 INJECTION, SOLUTION, CONCENTRATE INTRAVENOUS PRN
OUTPATIENT
Start: 2023-05-10

## 2022-12-28 RX ADMIN — DENOSUMAB 60 MG: 60 INJECTION SUBCUTANEOUS at 09:21

## 2023-04-05 ENCOUNTER — TELEPHONE (OUTPATIENT)
Dept: FAMILY MEDICINE CLINIC | Age: 88
End: 2023-04-05

## 2023-04-05 NOTE — TELEPHONE ENCOUNTER
----- Message from [employer] sent at 4/5/2023  3:34 PM EDT -----  Subject: Appointment Request    Reason for Call: Established Patient Appointment needed: Routine Back/Neck   Pain    QUESTIONS    Reason for appointment request? No appointments available during search     Additional Information for Provider? Patient has back pain. First   available appointment was 6/1. Patient needs earlier appointment.  Please   call.   ---------------------------------------------------------------------------  --------------  Tawana Noonan CWXX  [phone]; OK to leave message on voicemail  ---------------------------------------------------------------------------  --------------  SCRIPT ANSWERS  DALY Screen: Zoë Melton

## 2023-04-05 NOTE — TELEPHONE ENCOUNTER
I thought the patient and his wife both transferred their care to [facility].

## 2023-04-06 NOTE — TELEPHONE ENCOUNTER
He only seen Dr Vangie Lim once and that was when you were off due to your back. He has an upcoming appointment with you next month.

## 2023-04-19 ENCOUNTER — HOSPITAL ENCOUNTER (INPATIENT)
Age: 88
LOS: 3 days | Discharge: HOME | End: 2023-04-22
Attending: EMERGENCY MEDICINE | Admitting: STUDENT IN AN ORGANIZED HEALTH CARE EDUCATION/TRAINING PROGRAM
Payer: MEDICARE

## 2023-04-19 ENCOUNTER — APPOINTMENT (OUTPATIENT)
Dept: GENERAL RADIOLOGY | Age: 88
End: 2023-04-19
Payer: MEDICARE

## 2023-04-19 ENCOUNTER — APPOINTMENT (OUTPATIENT)
Dept: CT IMAGING | Age: 88
End: 2023-04-19
Payer: MEDICARE

## 2023-04-19 DIAGNOSIS — E86.0 DEHYDRATION: ICD-10-CM

## 2023-04-19 DIAGNOSIS — R63.0 DECREASED APPETITE: ICD-10-CM

## 2023-04-19 DIAGNOSIS — R19.7 DIARRHEA, UNSPECIFIED TYPE: ICD-10-CM

## 2023-04-19 DIAGNOSIS — K44.9 HIATAL HERNIA: ICD-10-CM

## 2023-04-19 DIAGNOSIS — I26.99 OTHER PULMONARY EMBOLISM WITHOUT ACUTE COR PULMONALE, UNSPECIFIED CHRONICITY (HCC): Primary | ICD-10-CM

## 2023-04-19 LAB
ALBUMIN SERPL-MCNC: 4 G/DL (ref 3.5–5.2)
ALP SERPL-CCNC: 77 U/L (ref 40–129)
ALT SERPL-CCNC: 12 U/L (ref 0–40)
ANION GAP SERPL CALCULATED.3IONS-SCNC: 11 MMOL/L (ref 7–16)
AST SERPL-CCNC: 25 U/L (ref 0–39)
BACTERIA URNS QL MICRO: ABNORMAL /HPF
BASOPHILS # BLD: 0.04 E9/L (ref 0–0.2)
BASOPHILS NFR BLD: 0.9 % (ref 0–2)
BILIRUB SERPL-MCNC: 0.3 MG/DL (ref 0–1.2)
BILIRUB UR QL STRIP: NEGATIVE
BUN SERPL-MCNC: 30 MG/DL (ref 6–23)
BURR CELLS: ABNORMAL
CALCIUM SERPL-MCNC: 8.4 MG/DL (ref 8.6–10.2)
CHLORIDE SERPL-SCNC: 103 MMOL/L (ref 98–107)
CLARITY UR: CLEAR
CO2 SERPL-SCNC: 24 MMOL/L (ref 22–29)
COLOR UR: YELLOW
CREAT SERPL-MCNC: 0.8 MG/DL (ref 0.7–1.2)
EKG ATRIAL RATE: 75 BPM
EKG P AXIS: 65 DEGREES
EKG P-R INTERVAL: 206 MS
EKG Q-T INTERVAL: 424 MS
EKG QRS DURATION: 114 MS
EKG QTC CALCULATION (BAZETT): 473 MS
EKG R AXIS: -11 DEGREES
EKG T AXIS: 92 DEGREES
EKG VENTRICULAR RATE: 75 BPM
EOSINOPHIL # BLD: 0 E9/L (ref 0.05–0.5)
EOSINOPHIL NFR BLD: 0 % (ref 0–6)
ERYTHROCYTE [DISTWIDTH] IN BLOOD BY AUTOMATED COUNT: 14.7 FL (ref 11.5–15)
GLUCOSE SERPL-MCNC: 118 MG/DL (ref 74–99)
GLUCOSE UR STRIP-MCNC: NEGATIVE MG/DL
HCT VFR BLD AUTO: 42.5 % (ref 37–54)
HGB BLD-MCNC: 13.7 G/DL (ref 12.5–16.5)
HGB UR QL STRIP: ABNORMAL
INR BLD: 1
KETONES UR STRIP-MCNC: 15 MG/DL
LACTATE BLDV-SCNC: 1.6 MMOL/L (ref 0.5–2.2)
LEUKOCYTE ESTERASE UR QL STRIP: NEGATIVE
LIPASE: 14 U/L (ref 13–60)
LYMPHOCYTES # BLD: 0.43 E9/L (ref 1.5–4)
LYMPHOCYTES NFR BLD: 2.6 % (ref 20–42)
MAGNESIUM SERPL-MCNC: 2.4 MG/DL (ref 1.6–2.6)
MCH RBC QN AUTO: 28.5 PG (ref 26–35)
MCHC RBC AUTO-ENTMCNC: 32.2 % (ref 32–34.5)
MCV RBC AUTO: 88.5 FL (ref 80–99.9)
MONOCYTES # BLD: 0.29 E9/L (ref 0.1–0.95)
MONOCYTES NFR BLD: 6.1 % (ref 2–12)
NEUTROPHILS # BLD: 4.03 E9/L (ref 1.8–7.3)
NEUTS SEG NFR BLD: 84.3 % (ref 43–80)
NITRITE UR QL STRIP: NEGATIVE
NRBC BLD-RTO: 0 /100 WBC
OVALOCYTES: ABNORMAL
PH UR STRIP: 5 [PH] (ref 5–9)
PLATELET # BLD AUTO: 210 E9/L (ref 130–450)
PMV BLD AUTO: 9.2 FL (ref 7–12)
POIKILOCYTES: ABNORMAL
POLYCHROMASIA: ABNORMAL
POTASSIUM SERPL-SCNC: 3.9 MMOL/L (ref 3.5–5)
PROT SERPL-MCNC: 7.2 G/DL (ref 6.4–8.3)
PROT UR STRIP-MCNC: NEGATIVE MG/DL
PROTHROMBIN TIME: 10.5 SEC (ref 9.3–12.4)
RBC # BLD AUTO: 4.8 E12/L (ref 3.8–5.8)
RBC #/AREA URNS HPF: ABNORMAL /HPF (ref 0–2)
SARS-COV-2 RDRP RESP QL NAA+PROBE: NOT DETECTED
SODIUM SERPL-SCNC: 138 MMOL/L (ref 132–146)
SP GR UR STRIP: 1.01 (ref 1–1.03)
T4 FREE SERPL-MCNC: 1.31 NG/DL (ref 0.93–1.7)
TEAR DROP CELLS: ABNORMAL
TROPONIN, HIGH SENSITIVITY: 11 NG/L (ref 0–11)
TROPONIN, HIGH SENSITIVITY: 13 NG/L (ref 0–11)
TSH SERPL-MCNC: 1.7 UIU/ML (ref 0.27–4.2)
UROBILINOGEN UR STRIP-ACNC: 0.2 E.U./DL
VARIANT LYMPHS NFR BLD: 6.1 % (ref 0–4)
WBC # BLD: 4.8 E9/L (ref 4.5–11.5)
WBC #/AREA URNS HPF: ABNORMAL /HPF (ref 0–5)

## 2023-04-19 PROCEDURE — 85025 COMPLETE CBC W/AUTO DIFF WBC: CPT

## 2023-04-19 PROCEDURE — 6370000000 HC RX 637 (ALT 250 FOR IP): Performed by: STUDENT IN AN ORGANIZED HEALTH CARE EDUCATION/TRAINING PROGRAM

## 2023-04-19 PROCEDURE — 2060000000 HC ICU INTERMEDIATE R&B

## 2023-04-19 PROCEDURE — 6360000004 HC RX CONTRAST MEDICATION: Performed by: RADIOLOGY

## 2023-04-19 PROCEDURE — 71045 X-RAY EXAM CHEST 1 VIEW: CPT

## 2023-04-19 PROCEDURE — 83605 ASSAY OF LACTIC ACID: CPT

## 2023-04-19 PROCEDURE — 2580000003 HC RX 258: Performed by: STUDENT IN AN ORGANIZED HEALTH CARE EDUCATION/TRAINING PROGRAM

## 2023-04-19 PROCEDURE — 93010 ELECTROCARDIOGRAM REPORT: CPT | Performed by: INTERNAL MEDICINE

## 2023-04-19 PROCEDURE — 84484 ASSAY OF TROPONIN QUANT: CPT

## 2023-04-19 PROCEDURE — 74177 CT ABD & PELVIS W/CONTRAST: CPT

## 2023-04-19 PROCEDURE — 83735 ASSAY OF MAGNESIUM: CPT

## 2023-04-19 PROCEDURE — 87635 SARS-COV-2 COVID-19 AMP PRB: CPT

## 2023-04-19 PROCEDURE — 96372 THER/PROPH/DIAG INJ SC/IM: CPT

## 2023-04-19 PROCEDURE — 84439 ASSAY OF FREE THYROXINE: CPT

## 2023-04-19 PROCEDURE — 80053 COMPREHEN METABOLIC PANEL: CPT

## 2023-04-19 PROCEDURE — 6360000002 HC RX W HCPCS: Performed by: STUDENT IN AN ORGANIZED HEALTH CARE EDUCATION/TRAINING PROGRAM

## 2023-04-19 PROCEDURE — 96361 HYDRATE IV INFUSION ADD-ON: CPT

## 2023-04-19 PROCEDURE — 71275 CT ANGIOGRAPHY CHEST: CPT

## 2023-04-19 PROCEDURE — 84443 ASSAY THYROID STIM HORMONE: CPT

## 2023-04-19 PROCEDURE — 99285 EMERGENCY DEPT VISIT HI MDM: CPT

## 2023-04-19 PROCEDURE — 85610 PROTHROMBIN TIME: CPT

## 2023-04-19 PROCEDURE — 96360 HYDRATION IV INFUSION INIT: CPT

## 2023-04-19 PROCEDURE — 83690 ASSAY OF LIPASE: CPT

## 2023-04-19 PROCEDURE — 93005 ELECTROCARDIOGRAM TRACING: CPT | Performed by: STUDENT IN AN ORGANIZED HEALTH CARE EDUCATION/TRAINING PROGRAM

## 2023-04-19 PROCEDURE — 81001 URINALYSIS AUTO W/SCOPE: CPT

## 2023-04-19 RX ORDER — SODIUM CHLORIDE 9 MG/ML
INJECTION, SOLUTION INTRAVENOUS PRN
Status: DISCONTINUED | OUTPATIENT
Start: 2023-04-19 | End: 2023-04-22 | Stop reason: HOSPADM

## 2023-04-19 RX ORDER — TRAMADOL HYDROCHLORIDE 50 MG/1
50 TABLET ORAL 3 TIMES DAILY
Status: DISCONTINUED | OUTPATIENT
Start: 2023-04-19 | End: 2023-04-19

## 2023-04-19 RX ORDER — SENNA PLUS 8.6 MG/1
1 TABLET ORAL DAILY PRN
Status: DISCONTINUED | OUTPATIENT
Start: 2023-04-19 | End: 2023-04-22 | Stop reason: HOSPADM

## 2023-04-19 RX ORDER — SODIUM CHLORIDE 0.9 % (FLUSH) 0.9 %
10 SYRINGE (ML) INJECTION PRN
Status: DISCONTINUED | OUTPATIENT
Start: 2023-04-19 | End: 2023-04-22 | Stop reason: HOSPADM

## 2023-04-19 RX ORDER — ENOXAPARIN SODIUM 100 MG/ML
1 INJECTION SUBCUTANEOUS ONCE
Status: COMPLETED | OUTPATIENT
Start: 2023-04-19 | End: 2023-04-19

## 2023-04-19 RX ORDER — ONDANSETRON 2 MG/ML
4 INJECTION INTRAMUSCULAR; INTRAVENOUS EVERY 6 HOURS PRN
Status: DISCONTINUED | OUTPATIENT
Start: 2023-04-19 | End: 2023-04-22 | Stop reason: HOSPADM

## 2023-04-19 RX ORDER — ACETAMINOPHEN 325 MG/1
650 TABLET ORAL EVERY 6 HOURS PRN
Status: DISCONTINUED | OUTPATIENT
Start: 2023-04-19 | End: 2023-04-22 | Stop reason: HOSPADM

## 2023-04-19 RX ORDER — ONDANSETRON 4 MG/1
4 TABLET, ORALLY DISINTEGRATING ORAL EVERY 8 HOURS PRN
Status: DISCONTINUED | OUTPATIENT
Start: 2023-04-19 | End: 2023-04-22 | Stop reason: HOSPADM

## 2023-04-19 RX ORDER — LANOLIN ALCOHOL/MO/W.PET/CERES
3 CREAM (GRAM) TOPICAL NIGHTLY PRN
Status: DISCONTINUED | OUTPATIENT
Start: 2023-04-19 | End: 2023-04-22 | Stop reason: HOSPADM

## 2023-04-19 RX ORDER — POTASSIUM CHLORIDE 20 MEQ/1
40 TABLET, EXTENDED RELEASE ORAL PRN
Status: DISCONTINUED | OUTPATIENT
Start: 2023-04-19 | End: 2023-04-22 | Stop reason: HOSPADM

## 2023-04-19 RX ORDER — POTASSIUM CHLORIDE 7.45 MG/ML
10 INJECTION INTRAVENOUS PRN
Status: DISCONTINUED | OUTPATIENT
Start: 2023-04-19 | End: 2023-04-22 | Stop reason: HOSPADM

## 2023-04-19 RX ORDER — SODIUM CHLORIDE, SODIUM LACTATE, POTASSIUM CHLORIDE, CALCIUM CHLORIDE 600; 310; 30; 20 MG/100ML; MG/100ML; MG/100ML; MG/100ML
INJECTION, SOLUTION INTRAVENOUS CONTINUOUS
Status: DISCONTINUED | OUTPATIENT
Start: 2023-04-19 | End: 2023-04-22 | Stop reason: HOSPADM

## 2023-04-19 RX ORDER — SODIUM CHLORIDE 0.9 % (FLUSH) 0.9 %
10 SYRINGE (ML) INJECTION EVERY 12 HOURS SCHEDULED
Status: DISCONTINUED | OUTPATIENT
Start: 2023-04-19 | End: 2023-04-22 | Stop reason: HOSPADM

## 2023-04-19 RX ORDER — LEVOTHYROXINE SODIUM 0.1 MG/1
100 TABLET ORAL DAILY
Status: DISCONTINUED | OUTPATIENT
Start: 2023-04-19 | End: 2023-04-22 | Stop reason: HOSPADM

## 2023-04-19 RX ORDER — 0.9 % SODIUM CHLORIDE 0.9 %
1000 INTRAVENOUS SOLUTION INTRAVENOUS ONCE
Status: COMPLETED | OUTPATIENT
Start: 2023-04-19 | End: 2023-04-19

## 2023-04-19 RX ORDER — ACETAMINOPHEN 650 MG/1
650 SUPPOSITORY RECTAL EVERY 6 HOURS PRN
Status: DISCONTINUED | OUTPATIENT
Start: 2023-04-19 | End: 2023-04-22 | Stop reason: HOSPADM

## 2023-04-19 RX ADMIN — ENOXAPARIN SODIUM 50 MG: 100 INJECTION SUBCUTANEOUS at 15:43

## 2023-04-19 RX ADMIN — SODIUM CHLORIDE 1000 ML: 9 INJECTION, SOLUTION INTRAVENOUS at 13:40

## 2023-04-19 RX ADMIN — SODIUM CHLORIDE, POTASSIUM CHLORIDE, SODIUM LACTATE AND CALCIUM CHLORIDE: 600; 310; 30; 20 INJECTION, SOLUTION INTRAVENOUS at 17:17

## 2023-04-19 RX ADMIN — IOPAMIDOL 75 ML: 755 INJECTION, SOLUTION INTRAVENOUS at 14:01

## 2023-04-19 RX ADMIN — APIXABAN 2.5 MG: 2.5 TABLET, FILM COATED ORAL at 20:46

## 2023-04-19 ASSESSMENT — PAIN DESCRIPTION - LOCATION: LOCATION: ABDOMEN

## 2023-04-19 ASSESSMENT — ENCOUNTER SYMPTOMS
RHINORRHEA: 0
SHORTNESS OF BREATH: 0
COUGH: 0
ABDOMINAL PAIN: 0
NAUSEA: 0
DIARRHEA: 1
VOMITING: 0
SORE THROAT: 0
BACK PAIN: 0

## 2023-04-19 ASSESSMENT — PAIN SCALES - GENERAL: PAINLEVEL_OUTOF10: 6

## 2023-04-19 ASSESSMENT — LIFESTYLE VARIABLES
HOW OFTEN DO YOU HAVE A DRINK CONTAINING ALCOHOL: NEVER
HOW MANY STANDARD DRINKS CONTAINING ALCOHOL DO YOU HAVE ON A TYPICAL DAY: PATIENT DOES NOT DRINK

## 2023-04-19 ASSESSMENT — PAIN - FUNCTIONAL ASSESSMENT: PAIN_FUNCTIONAL_ASSESSMENT: 0-10

## 2023-04-19 NOTE — ED PROVIDER NOTES
[facility]  ED Provider Note  Department of Emergency Medicine     ED Room:       Written by: Edgard Fernández DO  Patient Name: Noy Ivan  Attending Provider: Scott Angulo MD  Admit Date: 2023 11:17 AM  MRN: [id number]    : 1928        Chief Complaint   Patient presents with    Abdominal Pain    Eating Disorder     Not able to eat for several days     Diarrhea       HPI   Noy Ivan is a 80 y.o. male presenting to the ED for evaluation of Abdominal Pain, Eating Disorder (Not able to eat for several days ), and Diarrhea      History obtained from the patient and his close friend/neighbor. Limitations to history : None      Patient is a 80-year-old male with history of HTN, hypothyroidism, prostate cancer in the 1970s, PE and DVT in the past, on Eliquis 2.5 mg twice daily. He is presenting to the ED for evaluation of left flank pain for a few days and an episode of large amount of nonbloody nonblack diarrhea yesterday evening. Additionally, the patient's friend is very concerned about dehydration as the patient has decreased appetite that varies in severity daily. Notably, the patient actually denies abdominal pain, he points to his left flank/lower rib area and states that is where the pain is. He denies any pain with deep breathing, just states that it feels like a sharp discomfort. He has not had any emesis or abnormal urinary symptoms. Denies any fevers, cough, chest pain or palpitations or shortness of breath. He has not had any numbness or weakness anywhere, lower extremity edema or tenderness. He has not had any falls or injuries recently. Overall the patient states that he feels fatigued and really has decreased appetite. He is unsure of any specific weight loss but does feel like overall he has been losing weight and his friend agrees.         Nursing Notes were all reviewed and agreed with or any disagreements were addressed in the

## 2023-04-19 NOTE — ED NOTES
ED to Inpatient Handoff Report    Notified nurse that electronic handoff available and patient ready for transport to room 442. Safety Risks: Hearing problems, Difficulty with daily activities, and Risk of falls    Patient in Restraints: no    Constant Observer or Patient : no    Telemetry Monitoring Ordered :Yes           Order to transfer to unit without monitor:YES    Last MEWS: 1 Time completed: 1616    Vitals:    04/19/23 1129 04/19/23 1232 04/19/23 1544 04/19/23 1616   BP: 101/65 97/66 102/66 109/67   Pulse:  87 76 72   Resp:  16 16 16   Temp:    98.2 °F (36.8 °C)   TempSrc:    Oral   SpO2:  97% 99% 97%   Weight:       Height:           Opportunity for questions and clarification was provided.         Norberto Barger RN  04/19/23 8333

## 2023-04-20 LAB
ALBUMIN SERPL-MCNC: 3.3 G/DL (ref 3.5–5.2)
ALP SERPL-CCNC: 58 U/L (ref 40–129)
ALT SERPL-CCNC: 10 U/L (ref 0–40)
ANION GAP SERPL CALCULATED.3IONS-SCNC: 8 MMOL/L (ref 7–16)
AST SERPL-CCNC: 24 U/L (ref 0–39)
BASOPHILS # BLD: 0 E9/L (ref 0–0.2)
BASOPHILS NFR BLD: 0 % (ref 0–2)
BILIRUB SERPL-MCNC: 0.2 MG/DL (ref 0–1.2)
BUN SERPL-MCNC: 21 MG/DL (ref 6–23)
CALCIUM SERPL-MCNC: 7.4 MG/DL (ref 8.6–10.2)
CHLORIDE SERPL-SCNC: 106 MMOL/L (ref 98–107)
CO2 SERPL-SCNC: 24 MMOL/L (ref 22–29)
CREAT SERPL-MCNC: 0.7 MG/DL (ref 0.7–1.2)
D DIMER: 213 NG/ML DDU
EOSINOPHIL # BLD: 0.03 E9/L (ref 0.05–0.5)
EOSINOPHIL NFR BLD: 0.9 % (ref 0–6)
ERYTHROCYTE [DISTWIDTH] IN BLOOD BY AUTOMATED COUNT: 14.6 FL (ref 11.5–15)
GLUCOSE SERPL-MCNC: 103 MG/DL (ref 74–99)
HCT VFR BLD AUTO: 37.1 % (ref 37–54)
HGB BLD-MCNC: 11.8 G/DL (ref 12.5–16.5)
HYPOCHROMIA: ABNORMAL
LYMPHOCYTES # BLD: 0.76 E9/L (ref 1.5–4)
LYMPHOCYTES NFR BLD: 13.8 % (ref 20–42)
MAGNESIUM SERPL-MCNC: 2.1 MG/DL (ref 1.6–2.6)
MCH RBC QN AUTO: 28.4 PG (ref 26–35)
MCHC RBC AUTO-ENTMCNC: 31.8 % (ref 32–34.5)
MCV RBC AUTO: 89.2 FL (ref 80–99.9)
MONOCYTES # BLD: 0.27 E9/L (ref 0.1–0.95)
MONOCYTES NFR BLD: 6.9 % (ref 2–12)
NEUTROPHILS # BLD: 2.74 E9/L (ref 1.8–7.3)
NEUTS SEG NFR BLD: 72.4 % (ref 43–80)
NRBC BLD-RTO: 0 /100 WBC
PLATELET # BLD AUTO: 175 E9/L (ref 130–450)
PMV BLD AUTO: 8.9 FL (ref 7–12)
POTASSIUM SERPL-SCNC: 3.3 MMOL/L (ref 3.5–5)
PROT SERPL-MCNC: 5.7 G/DL (ref 6.4–8.3)
RBC # BLD AUTO: 4.16 E12/L (ref 3.8–5.8)
SODIUM SERPL-SCNC: 138 MMOL/L (ref 132–146)
VARIANT LYMPHS NFR BLD: 6 % (ref 0–4)
WBC # BLD: 3.8 E9/L (ref 4.5–11.5)

## 2023-04-20 PROCEDURE — 83735 ASSAY OF MAGNESIUM: CPT

## 2023-04-20 PROCEDURE — 36415 COLL VENOUS BLD VENIPUNCTURE: CPT

## 2023-04-20 PROCEDURE — 97535 SELF CARE MNGMENT TRAINING: CPT

## 2023-04-20 PROCEDURE — 85378 FIBRIN DEGRADE SEMIQUANT: CPT

## 2023-04-20 PROCEDURE — 97161 PT EVAL LOW COMPLEX 20 MIN: CPT

## 2023-04-20 PROCEDURE — 85025 COMPLETE CBC W/AUTO DIFF WBC: CPT

## 2023-04-20 PROCEDURE — 80053 COMPREHEN METABOLIC PANEL: CPT

## 2023-04-20 PROCEDURE — 97165 OT EVAL LOW COMPLEX 30 MIN: CPT

## 2023-04-20 PROCEDURE — 87449 NOS EACH ORGANISM AG IA: CPT

## 2023-04-20 PROCEDURE — 6370000000 HC RX 637 (ALT 250 FOR IP): Performed by: HOSPITALIST

## 2023-04-20 PROCEDURE — 2580000003 HC RX 258: Performed by: STUDENT IN AN ORGANIZED HEALTH CARE EDUCATION/TRAINING PROGRAM

## 2023-04-20 PROCEDURE — 87324 CLOSTRIDIUM AG IA: CPT

## 2023-04-20 PROCEDURE — 2060000000 HC ICU INTERMEDIATE R&B

## 2023-04-20 PROCEDURE — 87493 C DIFF AMPLIFIED PROBE: CPT

## 2023-04-20 PROCEDURE — 6370000000 HC RX 637 (ALT 250 FOR IP): Performed by: STUDENT IN AN ORGANIZED HEALTH CARE EDUCATION/TRAINING PROGRAM

## 2023-04-20 RX ADMIN — SODIUM CHLORIDE, POTASSIUM CHLORIDE, SODIUM LACTATE AND CALCIUM CHLORIDE: 600; 310; 30; 20 INJECTION, SOLUTION INTRAVENOUS at 08:11

## 2023-04-20 RX ADMIN — POTASSIUM CHLORIDE 40 MEQ: 1500 TABLET, EXTENDED RELEASE ORAL at 06:02

## 2023-04-20 RX ADMIN — APIXABAN 2.5 MG: 2.5 TABLET, FILM COATED ORAL at 20:14

## 2023-04-20 RX ADMIN — Medication 3 MG: at 20:15

## 2023-04-20 RX ADMIN — LEVOTHYROXINE SODIUM 100 MCG: 100 TABLET ORAL at 06:01

## 2023-04-20 RX ADMIN — SODIUM CHLORIDE, PRESERVATIVE FREE 10 ML: 5 INJECTION INTRAVENOUS at 08:09

## 2023-04-20 RX ADMIN — APIXABAN 2.5 MG: 2.5 TABLET, FILM COATED ORAL at 08:09

## 2023-04-20 ASSESSMENT — PAIN SCALES - GENERAL: PAINLEVEL_OUTOF10: 0

## 2023-04-20 NOTE — CONSULTS
No masses. No organmegaly. Normal bowel sounds. Skin: Warm and dry. No nodule on exposed extremities. No rash on exposed extremities. Lymph: No cervical LAD. No supraclavicular LAD. Ext: No joint deformity. No clubbing. No cyanosis. No edema  Neuro: Awake. Follows commands. Positive pupils/gag/corneals. Normal pain response. Lab Results:  CBC:   Recent Labs     04/19/23  1157 04/20/23  0155   WBC 4.8 3.8*   HGB 13.7 11.8*   HCT 42.5 37.1   MCV 88.5 89.2    175       BMP:  Recent Labs     04/19/23  1157 04/20/23  0155    138   K 3.9 3.3*    106   CO2 24 24   BUN 30* 21   CREATININE 0.8 0.7    ALB:3,BILIDIR:3,BILITOT:3,ALKPHOS:3)@    PT/INR:   Recent Labs     04/19/23  1157   PROTIME 10.5   INR 1.0       Cultures:  Sputum: not available  Blood: not available    ABG:   No results for input(s): PH, PO2, PCO2, HCO3, BE, O2SAT, METHB, O2HB, COHB, O2CON, HHB, THB in the last 72 hours. Films:     CT ABDOMEN PELVIS W IV CONTRAST Additional Contrast? None   Final Result   Large diaphragmatic defect visualized with herniation of the stomach,   peritoneal contents and bowel loops. Compression deformity of the T12 vertebral body is seen that was not   visualized on the prior study. CTA PULMONARY W CONTRAST   Final Result   Pulmonary embolic disease considered to be present at the left lower lobe   with a small thrombus burden. No evidence of right heart strain. 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 Very large hiatus hernia containing stomach and small bowel. XR CHEST PORTABLE   Final Result   1. Emphysematous changes. There are no findings of failure or pneumonia   2. Large hiatal hernia                 Assessment:  Pulmonary embolism. Saddle pulmonary embolism in 2016. Unprovoked. Large hiatal hernia. Plan:  Continue eliquis 2.5 mg. Thanks for letting us see this patient in consultation.   Total time in reviewing the

## 2023-04-20 NOTE — ACP (ADVANCE CARE PLANNING)
Advance Care Planning   Healthcare Decision Maker:    Primary Decision Maker: Alyssa Hunt - [facility] - [phone]

## 2023-04-21 ENCOUNTER — TELEPHONE (OUTPATIENT)
Dept: FAMILY MEDICINE CLINIC | Age: 88
End: 2023-04-21

## 2023-04-21 PROBLEM — A04.72 C. DIFFICILE COLITIS: Status: ACTIVE | Noted: 2023-04-21

## 2023-04-21 LAB
ANION GAP SERPL CALCULATED.3IONS-SCNC: 11 MMOL/L (ref 7–16)
BASOPHILS # BLD: 0 E9/L (ref 0–0.2)
BASOPHILS NFR BLD: 0 % (ref 0–2)
BUN SERPL-MCNC: 13 MG/DL (ref 6–23)
C DIFF TOX GENS STL QL NAA+PROBE: ABNORMAL
C DIFF TOXIN/ANTIGEN: NORMAL
CALCIUM SERPL-MCNC: 8 MG/DL (ref 8.6–10.2)
CHLORIDE SERPL-SCNC: 105 MMOL/L (ref 98–107)
CO2 SERPL-SCNC: 21 MMOL/L (ref 22–29)
CREAT SERPL-MCNC: 0.6 MG/DL (ref 0.7–1.2)
EOSINOPHIL # BLD: 0 E9/L (ref 0.05–0.5)
EOSINOPHIL NFR BLD: 0 % (ref 0–6)
ERYTHROCYTE [DISTWIDTH] IN BLOOD BY AUTOMATED COUNT: 14.6 FL (ref 11.5–15)
GLUCOSE SERPL-MCNC: 91 MG/DL (ref 74–99)
HCT VFR BLD AUTO: 40.6 % (ref 37–54)
HGB BLD-MCNC: 12.5 G/DL (ref 12.5–16.5)
HYPOCHROMIA: ABNORMAL
LYMPHOCYTES # BLD: 0.98 E9/L (ref 1.5–4)
LYMPHOCYTES NFR BLD: 15.8 % (ref 20–42)
MCH RBC QN AUTO: 27.8 PG (ref 26–35)
MCHC RBC AUTO-ENTMCNC: 30.8 % (ref 32–34.5)
MCV RBC AUTO: 90.4 FL (ref 80–99.9)
MONOCYTES # BLD: 0.67 E9/L (ref 0.1–0.95)
MONOCYTES NFR BLD: 10.5 % (ref 2–12)
NEUTROPHILS # BLD: 4.51 E9/L (ref 1.8–7.3)
NEUTS SEG NFR BLD: 73.7 % (ref 43–80)
NRBC BLD-RTO: 0 /100 WBC
ORGANISM: ABNORMAL
OVALOCYTES: ABNORMAL
PLATELET # BLD AUTO: 196 E9/L (ref 130–450)
PMV BLD AUTO: 9.2 FL (ref 7–12)
POLYCHROMASIA: ABNORMAL
POTASSIUM SERPL-SCNC: 3.4 MMOL/L (ref 3.5–5)
RBC # BLD AUTO: 4.49 E12/L (ref 3.8–5.8)
SODIUM SERPL-SCNC: 137 MMOL/L (ref 132–146)
WBC # BLD: 6.1 E9/L (ref 4.5–11.5)

## 2023-04-21 PROCEDURE — 2060000000 HC ICU INTERMEDIATE R&B

## 2023-04-21 PROCEDURE — 80048 BASIC METABOLIC PNL TOTAL CA: CPT

## 2023-04-21 PROCEDURE — 2580000003 HC RX 258: Performed by: HOSPITALIST

## 2023-04-21 PROCEDURE — 36415 COLL VENOUS BLD VENIPUNCTURE: CPT

## 2023-04-21 PROCEDURE — 6370000000 HC RX 637 (ALT 250 FOR IP): Performed by: HOSPITALIST

## 2023-04-21 PROCEDURE — 85025 COMPLETE CBC W/AUTO DIFF WBC: CPT

## 2023-04-21 RX ADMIN — LEVOTHYROXINE SODIUM 100 MCG: 100 TABLET ORAL at 05:36

## 2023-04-21 RX ADMIN — SODIUM CHLORIDE, PRESERVATIVE FREE 10 ML: 5 INJECTION INTRAVENOUS at 20:13

## 2023-04-21 RX ADMIN — SODIUM CHLORIDE, POTASSIUM CHLORIDE, SODIUM LACTATE AND CALCIUM CHLORIDE: 600; 310; 30; 20 INJECTION, SOLUTION INTRAVENOUS at 13:27

## 2023-04-21 RX ADMIN — APIXABAN 2.5 MG: 2.5 TABLET, FILM COATED ORAL at 08:57

## 2023-04-21 RX ADMIN — POTASSIUM CHLORIDE 40 MEQ: 1500 TABLET, EXTENDED RELEASE ORAL at 05:36

## 2023-04-21 RX ADMIN — Medication 125 MG: at 16:41

## 2023-04-21 RX ADMIN — APIXABAN 2.5 MG: 2.5 TABLET, FILM COATED ORAL at 20:13

## 2023-04-21 ASSESSMENT — PAIN SCALES - GENERAL
PAINLEVEL_OUTOF10: 0

## 2023-04-21 NOTE — CARE COORDINATION
CM followed up w/spouse Alyssa who diverted the call to caregiver, Constantin Handler. Hassel Handler requesting HHC, refuses SNF w/HHC orders placed and [facility] accepting, please notify upon discharge. PT am-pac: 16, OT am-pac: 15. Cdiff pending. Will follow.   FOUZIA CortésN, RN  [facility] Case Management  [phone]
Social Work/Discharge Planning:  Notified Angella with [address] of possible discharge tomorrow. Will continue to follow.   Electronically signed by YASMANY Mcintyre on 4/21/2023 at 3:12 PM
Status: Full Code   Patient's Primary Decision Maker is: Legal Next of Kin    Primary Decision Maker: Alyssa Hunt - Spouse - [phone]    Discharge Planning:    Patient lives with: Spouse/Significant Other Type of Home: House  Primary Care Giver: Self (Also has caregivers five days a week for five hours a day.)  Patient Support Systems include: Spouse/Significant Other, Friends/Neighbors   Current Financial resources:    Current community resources:    Current services prior to admission: [facility] (caretaker)            Current DME:              Type of Home Care services:  None    ADLS  Prior functional level: Assistance with the following:, Shopping, Housework, Cooking  Current functional level: Assistance with the following:, Cooking, Housework, Shopping    PT AM-PAC:   /24  OT AM-PAC: 15 /24    Family can provide assistance at DC: Yes  Would you like Case Management to discuss the discharge plan with any other family members/significant others, and if so, who?  No  Plans to Return to Present Housing: Yes  Other Identified Issues/Barriers to RETURNING to current housing: NA  Potential Assistance needed at discharge: N/A            Potential DME:    Patient expects to discharge to: [address] [place] [address] for transportation at discharge:      Financial    Payor: [employer] / Plan: Bartolo Moise ESSENTIAL/PLUS / Product Type: *No Product type* /     Does insurance require precert for SNF: Yes    Potential assistance Purchasing Medications: No  Meds-to-Beds request: No      [id number] - [address]  [place] [address]  Phone: [phone] Fax: [facility] [address] [place] [id number]  Phone: [phone] Fax: [phone]      Notes:    Factors facilitating achievement of predicted outcomes: Caregiver

## 2023-04-21 NOTE — TELEPHONE ENCOUNTER
Patient is scheduled to be discharged today from [facility]. Will you follow for skilled nursing. PT & OT?     [place] [phone] ext [phone]

## 2023-04-21 NOTE — PLAN OF CARE
Problem: ABCDS Injury Assessment  Goal: Absence of physical injury  4/20/2023 2118 by Dusty Schmidt RN  Outcome: Not Progressing     Problem: Discharge Planning  Goal: Discharge to home or other facility with appropriate resources  4/20/2023 2118 by Dusty Schmidt RN  Outcome: Not Progressing     Problem: Pain  Goal: Verbalizes/displays adequate comfort level or baseline comfort level  4/20/2023 2118 by Dusty Schmidt RN  Outcome: Not Progressing     Problem: Safety - Adult  Goal: Free from fall injury  4/20/2023 2118 by Dusty Schmidt RN  Outcome: Not Progressing     Problem: ABCDS Injury Assessment  Goal: Absence of physical injury  4/20/2023 2118 by Dusty Schmidt RN  Outcome: Not Progressing  4/20/2023 1842 by Bradly Sauer RN  Outcome: Progressing     Problem: Discharge Planning  Goal: Discharge to home or other facility with appropriate resources  4/20/2023 2118 by Dusty Schmidt RN  Outcome: Not Progressing  4/20/2023 1842 by Bradly Sauer RN  Outcome: Progressing     Problem: Pain  Goal: Verbalizes/displays adequate comfort level or baseline comfort level  4/20/2023 2118 by Dusty Schmidt RN  Outcome: Not Progressing  4/20/2023 1842 by Bradly Sauer RN  Outcome: Progressing     Problem: Safety - Adult  Goal: Free from fall injury  4/20/2023 2118 by Dusty Schmidt RN  Outcome: Not Progressing  4/20/2023 1842 by Bradly Sauer RN  Outcome: Progressing

## 2023-04-21 NOTE — H&P
Internal Medicine History & Physical     Name: Michelle Palumbo  : 1928  Chief Complaint: Abdominal Pain, Eating Disorder (Not able to eat for several days ), and Diarrhea  Primary Care Physician: Leland Alegria MD  Admission date: 2023  Date of service: 2023     History of Present Illness  Rio Galloway is a 80y.o. year old male. Patient came to the hospital after having severe pain in his left flank that was not resolving after 1 to 2 days. It was keeping him from sleeping. Patient also admits to extended amount of diarrhea and getting more weak at home. Patient's caregiver and wife are present at the time of evaluation and confirm information. Patient has been eating and drinking less at home but states since he was admitted he is starting to feel little better. Denies fevers, chills, headache, vision or hearing changes, dysuria, hematuria, new onset of weakness, numbness, tingling, blood in the stool, melena. Patient states nothing was making his symptoms better and the pain was continuing and it was a nagging, severe pain. No change with position. Patient is a fair historian      ED course:   Initial blood work and imaging studies performed. Admission recommended by ED physician. Case was discussed with ED provider.  Meds in ED consisted of the following:  Medications   sodium chloride flush 0.9 % injection 10 mL (10 mLs IntraVENous Not Given 23)   sodium chloride flush 0.9 % injection 10 mL (has no administration in time range)   0.9 % sodium chloride infusion (has no administration in time range)   potassium chloride (KLOR-CON M) extended release tablet 40 mEq (40 mEq Oral Given 23)     Or   potassium bicarb-citric acid (EFFER-K) effervescent tablet 40 mEq ( Oral See Alternative 23)     Or   potassium chloride 10 mEq/100 mL IVPB (Peripheral Line) ( IntraVENous See Alternative 23)   ondansetron (ZOFRAN-ODT) disintegrating tablet 4 mg (has no

## 2023-04-22 VITALS
SYSTOLIC BLOOD PRESSURE: 124 MMHG | HEART RATE: 71 BPM | OXYGEN SATURATION: 97 % | RESPIRATION RATE: 18 BRPM | DIASTOLIC BLOOD PRESSURE: 72 MMHG | WEIGHT: 110 LBS | TEMPERATURE: 98.2 F | BODY MASS INDEX: 16.67 KG/M2 | HEIGHT: 68 IN

## 2023-04-22 LAB
ANION GAP SERPL CALCULATED.3IONS-SCNC: 9 MMOL/L (ref 7–16)
BASOPHILS # BLD: 0.01 E9/L (ref 0–0.2)
BASOPHILS NFR BLD: 0.2 % (ref 0–2)
BUN SERPL-MCNC: 12 MG/DL (ref 6–23)
CALCIUM SERPL-MCNC: 8.3 MG/DL (ref 8.6–10.2)
CHLORIDE SERPL-SCNC: 106 MMOL/L (ref 98–107)
CO2 SERPL-SCNC: 23 MMOL/L (ref 22–29)
CREAT SERPL-MCNC: 0.7 MG/DL (ref 0.7–1.2)
EOSINOPHIL # BLD: 0.17 E9/L (ref 0.05–0.5)
EOSINOPHIL NFR BLD: 3.8 % (ref 0–6)
ERYTHROCYTE [DISTWIDTH] IN BLOOD BY AUTOMATED COUNT: 14.3 FL (ref 11.5–15)
GLUCOSE SERPL-MCNC: 97 MG/DL (ref 74–99)
HCT VFR BLD AUTO: 39.1 % (ref 37–54)
HGB BLD-MCNC: 12.7 G/DL (ref 12.5–16.5)
IMM GRANULOCYTES # BLD: 0.01 E9/L
IMM GRANULOCYTES NFR BLD: 0.2 % (ref 0–5)
LYMPHOCYTES # BLD: 1.43 E9/L (ref 1.5–4)
LYMPHOCYTES NFR BLD: 31.9 % (ref 20–42)
MCH RBC QN AUTO: 28.5 PG (ref 26–35)
MCHC RBC AUTO-ENTMCNC: 32.5 % (ref 32–34.5)
MCV RBC AUTO: 87.9 FL (ref 80–99.9)
MONOCYTES # BLD: 0.58 E9/L (ref 0.1–0.95)
MONOCYTES NFR BLD: 12.9 % (ref 2–12)
NEUTROPHILS # BLD: 2.28 E9/L (ref 1.8–7.3)
NEUTS SEG NFR BLD: 51 % (ref 43–80)
PLATELET # BLD AUTO: 204 E9/L (ref 130–450)
PMV BLD AUTO: 9.1 FL (ref 7–12)
POTASSIUM SERPL-SCNC: 3.3 MMOL/L (ref 3.5–5)
RBC # BLD AUTO: 4.45 E12/L (ref 3.8–5.8)
SODIUM SERPL-SCNC: 138 MMOL/L (ref 132–146)
WBC # BLD: 4.5 E9/L (ref 4.5–11.5)

## 2023-04-22 PROCEDURE — 36415 COLL VENOUS BLD VENIPUNCTURE: CPT

## 2023-04-22 PROCEDURE — 6370000000 HC RX 637 (ALT 250 FOR IP): Performed by: HOSPITALIST

## 2023-04-22 PROCEDURE — 85025 COMPLETE CBC W/AUTO DIFF WBC: CPT

## 2023-04-22 PROCEDURE — 80048 BASIC METABOLIC PNL TOTAL CA: CPT

## 2023-04-22 RX ADMIN — Medication 125 MG: at 05:57

## 2023-04-22 RX ADMIN — POTASSIUM BICARBONATE 40 MEQ: 782 TABLET, EFFERVESCENT ORAL at 05:57

## 2023-04-22 RX ADMIN — Medication 125 MG: at 00:00

## 2023-04-22 RX ADMIN — Medication 125 MG: at 15:22

## 2023-04-22 RX ADMIN — LEVOTHYROXINE SODIUM 100 MCG: 100 TABLET ORAL at 05:57

## 2023-04-22 RX ADMIN — APIXABAN 2.5 MG: 2.5 TABLET, FILM COATED ORAL at 08:20

## 2023-04-22 RX ADMIN — POTASSIUM CHLORIDE 40 MEQ: 1500 TABLET, EXTENDED RELEASE ORAL at 08:20

## 2023-04-22 ASSESSMENT — PAIN SCALES - GENERAL
PAINLEVEL_OUTOF10: 0
PAINLEVEL_OUTOF10: 0

## 2023-04-22 NOTE — PROGRESS NOTES
[facility]  [address] [facility], [address], [address] [facility]      Pulmonary Progress Note      SUBJECTIVE:  lying down in bed on RA, claims doing ok with breathing with similar minimal cough/sputum production. Getting up from bed to sit occasionally, didn't have much of a problem when did this yesterday.     OBJECTIVE    Medications    Continuous Infusions:   sodium chloride      lactated ringers IV soln 75 mL/hr at 23 0516       Scheduled Meds:   sodium chloride flush  10 mL IntraVENous 2 times per day    apixaban  2.5 mg Oral BID    levothyroxine  100 mcg Oral Daily       PRN Meds:sodium chloride flush, sodium chloride, potassium chloride **OR** potassium alternative oral replacement **OR** potassium chloride, ondansetron **OR** ondansetron, senna, acetaminophen **OR** acetaminophen, melatonin    Physical    VITALS:  /76   Pulse 73   Temp 97.5 °F (36.4 °C) (Axillary)   Resp 18   Ht 5' 8\" (1.727 m)   Wt 111 lb 9.6 oz (50.6 kg)   SpO2 97%   BMI 16.97 kg/m²     24HR INTAKE/OUTPUT:      Intake/Output Summary (Last 24 hours) at 2023 1153  Last data filed at 2023 0516  Gross per 24 hour   Intake 1584.47 ml   Output --   Net 1584.47 ml       24HR PULSE OXIMETRY RANGE:    SpO2  Av.2 %  Min: 94 %  Max: 100 %    General appearance: alert, appears stated age, and cooperative  Lungs: clear to auscultation bilaterally  Heart: regular rate and rhythm, S1, S2 normal, no murmur, click, rub or gallop  Abdomen: soft, non-tender; bowel sounds normal; no masses,  no organomegaly  Extremities: extremities normal, atraumatic, no cyanosis or edema  Neurologic: Mental status: Alert, oriented, thought content appropriate    Data    CBC:   Recent Labs     23  1157 23  0155 23  0139   WBC 4.8 3.8* 6.1   HGB 13.7 11.8* 12.5   HCT 42.5 37.1 40.6   MCV 88.5 89.2 90.4    175 196       BMP:  Recent Labs     23  1157
Database initiated. Patient is A&O with some forgetfulness. He is from home with wife and private duty caregiver. He ambulates with a walker and a cane. He is hard of hearing even with his hearing aides.  Pharmacy tech to verify home medications
Messaged Dr Rita Call regarding home medications.
Notified Dr Stephanie Rodriguez of stool for cdif positive
Notified Yeni Pina that patient is being discharged today 4/22/23.
[facility] to  patient at 3:30. Wife Shelia Szymanski and care taker Emmett Bernal notified.
Pulm consult called to Dr. Moses Teddy answering service
Spoke with pt caregiver, Alejandra Noyola, regarding pt home medications. Confirmed all pt home medications.
[R63.0]  Pulmonary embolism on left ([address] [I26.99]  Other pulmonary embolism without acute cor pulmonale, unspecified chronicity (HCC) [I26.99]  Diarrhea, unspecified type [R19.7]    Current Treatment Recommendations:     [x] Strengthening to improve independence with functional mobility   [] ROM to improve independence with functional mobility   [x] Balance Training to improve static/dynamic balance and to reduce fall risk  [x] Endurance Training to improve activity tolerance during functional mobility   [x] Transfer Training to improve safety and independence with all functional transfers   [x] Gait Training to improve gait mechanics, endurance and assess need for appropriate assistive device  [x] Stair Training in preparation for safe discharge home and/or into the community   [] Positioning to prevent skin breakdown and contractures  [x] Safety and Education Training   [x] Patient/Caregiver Education   [] HEP  [] Other     PT long term treatment goals are located in above grid    Frequency of treatments: 2-5x/week x 1-2 weeks. Time in  1332  Time out  1344    Evaluation Time includes thorough review of current medical information, gathering information on past medical history/social history and prior level of function, completion of standardized testing/informal observation of tasks, assessment of data and education on plan of care and goals.     CPT codes:  [x] Low Complexity PT evaluation 64965  [] Moderate Complexity PT evaluation 10062  [] High Complexity PT evaluation 43007  [] PT Re-evaluation 67080  [] Therapeutic activities 57635 __minutes  [] Therapeutic exercises 35711 __ minutes      Allison Winter, PT, DPT  PT [id number]
caregiver when medically stable. If patient is doing well tomorrow after initiating oral vancomycin, consider discharge and follow-up as an outpatient. Electronically signed by Janeen Morris MD on 4/21/2023 at 4:04 PM    I can be reached through [facility].
minutes for improved engagement with functional transfers and indep in ADLs     Visual/  Perceptual Hx of macular degeneration      NA      Hand Dominance:      AROM (PROM) Strength Additional Info:  Goal:   RUE  WFL 3+/5 good  and wfl FMC/dexterity noted during ADL tasks   Improve overall RUE strength  for participation in functional tasks   LUE WFL 3+/5 good  and wfl FMC/dexterity noted during ADL tasks   Improve overall LUE strength  for participation in functional tasks     Hearing:  [place]; wears hearing aides    Sensation:   No c/o numbness or tingling  Tone:  WFL   Edema:     Comments: RN cleared patient for OT. Upon arrival patient in supine. Therapist facilitated and instructed pt on adapted  techniques & compensatory strategies to improve safety and independence with basic ADLs, bed mobility, functional transfers and mobility to allow pt to achieve highest level of independence and safely. Pt demonstrated fair understanding of education & follow through. At end of session, patient was in chair, alarm on, with call light and phone within reach, all lines and tubes intact. Overall, patient demonstrated  decreased independence and safety during completion of ADL tasks. Pt would benefit from continued skilled OT to increase safety and independence with completion of ADL tasks and functional mobility for improved quality of life. Treatment: OT treatment provided this date includes:   Instructions/training on safety, sequencing, and adapted techniques for completion of ADLs. Facilitated bed mobility with cues for proper body mechanics and sequencing to prepare for ADL completion. Instruction/training on safe functional mobility/transfer techniques including hand and feet placement   Facilitated proper positioning/alignment to improve interaction with environment, breathing, overall functioning    Rehab Potential: Good for established goals.       Patient / Family Goal: Not stated;

## 2023-04-22 NOTE — DISCHARGE SUMMARY
(OSCAL) 500 MG TABS tablet Comments:   Reason for Stopping:         magnesium gluconate (MAGONATE) 500 MG tablet Comments:   Reason for Stopping:             Current Discharge Medication List        START taking these medications    Details   vancomycin (VANCOCIN) 50 mg/mL oral solution Take 2.5 mLs by mouth every 6 hours for 10 days  Qty: 100 mL, Refills: 0             FOLLOW UP/INSTRUCTIONS:  Follow-up with primary care physician in 7-14 days. Follow-up with the consults listed above as directed by them. Resume home medications and take new medications as directed. If recurrence of symptoms go to the ED. Activity: as tolerated  Diet: regular  Oxygenation: none    Preparing for this patient's discharge, including paperwork, orders, instructions, and meeting with patient did required 37 minutes.     Electronically signed by Brayan Wilde MD on 4/22/2023 at 11:30 AM

## 2023-04-24 ENCOUNTER — CARE COORDINATION (OUTPATIENT)
Dept: CASE MANAGEMENT | Age: 88
End: 2023-04-24

## 2023-04-24 ENCOUNTER — TELEPHONE (OUTPATIENT)
Dept: ENT CLINIC | Age: 88
End: 2023-04-24

## 2023-04-24 DIAGNOSIS — I26.99 PULMONARY EMBOLISM ON LEFT (HCC): Primary | ICD-10-CM

## 2023-04-24 PROCEDURE — 1111F DSCHRG MED/CURRENT MED MERGE: CPT | Performed by: INTERNAL MEDICINE

## 2023-04-24 NOTE — CARE COORDINATION
[facility] Care Transitions Initial Follow Up Call    Call within 2 business days of discharge: Yes    Patient Current Location:  Home: [address] [facility] [address] [place] [address]    Care Transition Nurse contacted the caregiver and pt's wife by telephone to perform post hospital discharge assessment. Provided introduction to self, and explanation of the Care Transition Nurse role. Patient: Sindhu Downey Patient : 1928   MRN: [id number]  Reason for Admission: Other pulmonary embolism without acute cor pulmonale, unspecified chronicity   Discharge Date: 23 RARS: Readmission Risk Score: 12.4      Last Discharge  Street       Date Complaint Diagnosis Description Type Department Provider    23 Abdominal Pain; Eating Disorder; Diarrhea Other pulmonary embolism without acute cor pulmonale, unspecified chronicity ([facility] . .. ED to Hosp-Admission (Discharged) (ADMITTED) MOHINDER Guzman DO; [facility] [place], [place]... Was this an external facility discharge? No     Challenges to be reviewed by the provider   Additional needs identified to be addressed with provider: No  none               Method of communication with provider: none. CTN called and spoke with both the pt's caregiver, Cindy Bach, and pt's wife for an initial care transition call. Both listed on pt's HIPAA communication release form. Staredue Points talking on call with pt's wife in the background participating as well. Aloke Points did not engage much in conversation and was short and clipped with her answers when CTN was asking questions. Pt admitted on 23 with dx PE and +C-diff (+23). Pt presented to ED with c/o abd/flank pain, decreased PO intake and diarrhea. Starlette Points states that the pt is doing \"fine\" and offered no complaints. She denies pt has had any c/o abd/flank pain, n/v, fever/chills, or diarrhea. She states that pt's diarrhea has resolved and states that last BM was yesterday and appeared more formed.  She states that

## 2023-04-24 NOTE — TELEPHONE ENCOUNTER
Patient was a no show on 4/24/23. Spoke to patients daughter who states patient just got home from the hospital. Once patient is situated and feeling better they will call to reschedule.      Electronically signed by Cynthia Reyes on 4/24/2023 at 10:20 AM

## 2023-04-27 ENCOUNTER — OFFICE VISIT (OUTPATIENT)
Dept: FAMILY MEDICINE CLINIC | Age: 88
End: 2023-04-27
Payer: MEDICARE

## 2023-04-27 VITALS
OXYGEN SATURATION: 100 % | BODY MASS INDEX: 16.73 KG/M2 | WEIGHT: 110 LBS | TEMPERATURE: 97.7 F | HEART RATE: 95 BPM | SYSTOLIC BLOOD PRESSURE: 92 MMHG | DIASTOLIC BLOOD PRESSURE: 62 MMHG

## 2023-04-27 DIAGNOSIS — R30.0 DYSURIA: Primary | ICD-10-CM

## 2023-04-27 DIAGNOSIS — R30.0 DYSURIA: ICD-10-CM

## 2023-04-27 DIAGNOSIS — A04.72 C. DIFFICILE DIARRHEA: ICD-10-CM

## 2023-04-27 LAB
BILIRUBIN, POC: NORMAL
BLOOD URINE, POC: NORMAL
CLARITY, POC: NORMAL
COLOR, POC: YELLOW
GLUCOSE URINE, POC: NORMAL
KETONES, POC: NORMAL
LEUKOCYTE EST, POC: NORMAL
NITRITE, POC: POSITIVE
PH, POC: 6
PROTEIN, POC: NORMAL
SPECIFIC GRAVITY, POC: 1.02
UROBILINOGEN, POC: 0.2

## 2023-04-27 PROCEDURE — 1123F ACP DISCUSS/DSCN MKR DOCD: CPT | Performed by: FAMILY MEDICINE

## 2023-04-27 PROCEDURE — 81002 URINALYSIS NONAUTO W/O SCOPE: CPT | Performed by: FAMILY MEDICINE

## 2023-04-27 PROCEDURE — 99213 OFFICE O/P EST LOW 20 MIN: CPT | Performed by: FAMILY MEDICINE

## 2023-04-27 RX ORDER — NITROFURANTOIN 25; 75 MG/1; MG/1
100 CAPSULE ORAL 2 TIMES DAILY
Qty: 20 CAPSULE | Refills: 0 | Status: SHIPPED | OUTPATIENT
Start: 2023-04-27 | End: 2023-05-07

## 2023-04-27 RX ORDER — VANCOMYCIN HYDROCHLORIDE 125 MG/1
125 CAPSULE ORAL 4 TIMES DAILY
Qty: 40 CAPSULE | Refills: 0 | Status: SHIPPED | OUTPATIENT
Start: 2023-04-27 | End: 2023-05-07

## 2023-04-27 ASSESSMENT — ENCOUNTER SYMPTOMS
RESPIRATORY NEGATIVE: 1
DIARRHEA: 1
ABDOMINAL PAIN: 0
BACK PAIN: 1

## 2023-04-27 NOTE — PROGRESS NOTES
Gisella Mijares (:  1928) is a 80 y.o. male,Established patient, here for evaluation of the following chief complaint(s):  Dysuria         ASSESSMENT/PLAN:  1. Dysuria  -     POCT Urinalysis no Micro  -     Culture, Urine; Future  -     vancomycin (VANCOCIN) 125 MG capsule; Take 1 capsule by mouth 4 times daily for 10 days, Disp-40 capsule, R-0Normal  -     nitrofurantoin, macrocrystal-monohydrate, (MACROBID) 100 MG capsule; Take 1 capsule by mouth 2 times daily for 10 days, Disp-20 capsule, R-0Normal  2. C. difficile diarrhea  At this time we will continue with the vancomycin due to recent C. difficile colitis. Treat symptomatically for the UTI with Macrobid. Sent for culture. Will adjust if necessary. Advised to push fluids. Red flags discussed with nursing staff and she voices understanding. No follow-ups on file. Subjective   SUBJECTIVE/OBJECTIVE:  HPI  Patient presents today for several day history of worsening dysuria. Concerned about a urinary tract infection. Was recently in the hospital for dehydration and C. difficile colitis. Finishing up a course of vancomycin oral.  No fever or chills. No noticed hematuria. States that she has noticed slight difficulty with balance and potential early altered mental status. Review of Systems   Constitutional:  Positive for fatigue. HENT: Negative. Respiratory: Negative. Cardiovascular: Negative. Gastrointestinal:  Positive for diarrhea. Negative for abdominal pain. Genitourinary:  Positive for dysuria, frequency and urgency. Negative for flank pain. Musculoskeletal:  Positive for back pain and gait problem. Skin: Negative. All other systems reviewed and are negative.        Current Outpatient Medications:     vancomycin (VANCOCIN) 125 MG capsule, Take 1 capsule by mouth 4 times daily for 10 days, Disp: 40 capsule, Rfl: 0    nitrofurantoin, macrocrystal-monohydrate, (MACROBID) 100 MG capsule, Take 1 capsule by mouth

## 2023-04-30 LAB
BACTERIA UR CULT: ABNORMAL
ORGANISM: ABNORMAL

## 2023-05-03 ENCOUNTER — CARE COORDINATION (OUTPATIENT)
Dept: CASE MANAGEMENT | Age: 88
End: 2023-05-03

## 2023-05-03 NOTE — CARE COORDINATION
\"We are fine. \" Reviewed upcoming appt information for pt's HFU on 5/5/23. She is aware and per caregiver, pt will be going. CTN signing off and resolving CT episode today per request by caregiver/family. Addressed changes since last contact:  medications-Macrobid and Vanco started on 4/27/23. Walk-in care visit on 4/27/23~+UTI (E.coli)  Pt scheduled HFU with pcp on 5/5/23~caregiver aware    Follow Up  Future Appointments   Date Time Provider Axel Lazo   5/5/2023 12:00 PM Johnathan Carrel,  W [address] [place] [address]   5/10/2023 10:00 AM Abiodun Helm DO BDM [facility]   5/17/2023  1:00 PM Johnathan Carrel, MD [facility]   6/28/2023 10:00 AM [facility] RM 2 [facility]       Care Transition Nurse reviewed medical action plan and red flags with caregiver and discussed any barriers to care and/or understanding of plan of care after discharge. Discussed appropriate site of care based on symptoms and resources available to patient including: PCP  Urgent care clinics  [facility]  When to call 911. The caregiver agrees to contact the PCP office for questions related to their healthcare.      Patients top risk factors for readmission: functional physical ability, medical condition-PE/DVT, +cdiff, +UTI, HTN, prostate CA, and polypharmacy  Interventions to address risk factors: Scheduled appointment with PCP-Keep HFU appt with pcp on 5/5/23~reviewed with caregiver       Care Transitions Subsequent and Final Call    Schedule Follow Up Appointment with PCP: Completed  Subsequent and Final Calls  Do you have any ongoing symptoms?: No  Have your medications changed?: Yes  Patient Reports: Pt prescribed Macrobid x10 days and Vanco PO x 10d from walk-in care visit on 4/27/23 (+UTI and recent dx C-diff)  Do you have any questions related to your medications?: No  Do you currently have any active services?: Yes  Are you currently active with any services?: Home Health  Do you have any needs or concerns that

## 2023-05-05 ENCOUNTER — OFFICE VISIT (OUTPATIENT)
Dept: FAMILY MEDICINE CLINIC | Age: 88
End: 2023-05-05

## 2023-05-05 VITALS
OXYGEN SATURATION: 96 % | DIASTOLIC BLOOD PRESSURE: 60 MMHG | SYSTOLIC BLOOD PRESSURE: 90 MMHG | HEART RATE: 89 BPM | BODY MASS INDEX: 18.79 KG/M2 | TEMPERATURE: 97.5 F | WEIGHT: 123.6 LBS

## 2023-05-05 DIAGNOSIS — R56.9 SEIZURE (HCC): ICD-10-CM

## 2023-05-05 DIAGNOSIS — M80.80XD OTHER OSTEOPOROSIS WITH CURRENT PATHOLOGICAL FRACTURE WITH ROUTINE HEALING, SUBSEQUENT ENCOUNTER: ICD-10-CM

## 2023-05-05 DIAGNOSIS — E03.9 ACQUIRED HYPOTHYROIDISM: ICD-10-CM

## 2023-05-05 DIAGNOSIS — R41.0 CONFUSION: ICD-10-CM

## 2023-05-05 DIAGNOSIS — Z09 HOSPITAL DISCHARGE FOLLOW-UP: ICD-10-CM

## 2023-05-05 DIAGNOSIS — I10 ESSENTIAL HYPERTENSION: ICD-10-CM

## 2023-05-05 DIAGNOSIS — C61 PROSTATE CANCER (HCC): ICD-10-CM

## 2023-05-05 DIAGNOSIS — M80.80XD OTHER OSTEOPOROSIS WITH CURRENT PATHOLOGICAL FRACTURE WITH ROUTINE HEALING, SUBSEQUENT ENCOUNTER: Primary | ICD-10-CM

## 2023-05-05 LAB
ALBUMIN SERPL-MCNC: 3.6 G/DL (ref 3.5–5.2)
ALP SERPL-CCNC: 73 U/L (ref 40–129)
ALT SERPL-CCNC: 14 U/L (ref 0–40)
ANION GAP SERPL CALCULATED.3IONS-SCNC: 9 MMOL/L (ref 7–16)
AST SERPL-CCNC: 19 U/L (ref 0–39)
BILIRUB SERPL-MCNC: 0.4 MG/DL (ref 0–1.2)
BUN SERPL-MCNC: 24 MG/DL (ref 6–23)
CALCIUM SERPL-MCNC: 9.3 MG/DL (ref 8.6–10.2)
CHLORIDE SERPL-SCNC: 105 MMOL/L (ref 98–107)
CO2 SERPL-SCNC: 25 MMOL/L (ref 22–29)
CREAT SERPL-MCNC: 0.8 MG/DL (ref 0.7–1.2)
GLUCOSE SERPL-MCNC: 109 MG/DL (ref 74–99)
MAGNESIUM SERPL-MCNC: 2.3 MG/DL (ref 1.6–2.6)
POTASSIUM SERPL-SCNC: 4.2 MMOL/L (ref 3.5–5)
PROT SERPL-MCNC: 6.6 G/DL (ref 6.4–8.3)
SODIUM SERPL-SCNC: 139 MMOL/L (ref 132–146)

## 2023-05-05 RX ORDER — TERIPARATIDE 250 UG/ML
20 INJECTION, SOLUTION SUBCUTANEOUS DAILY
Qty: 2.48 ML | Refills: 5 | Status: SHIPPED | OUTPATIENT
Start: 2023-05-05

## 2023-05-05 NOTE — PROGRESS NOTES
Future  -     US HEAD NECK SOFT TISSUE THYROID; Future    Confusion  -     Comprehensive Metabolic Panel; Future  -     Magnesium; Future  -     MRI BRAIN W WO CONTRAST; Future  -     EEG awake and asleep; Future  -     US HEAD NECK SOFT TISSUE THYROID; Future    Seizure ([facility]  -     Comprehensive Metabolic Panel; Future  -     Magnesium; Future  -     MRI BRAIN W WO CONTRAST; Future  -     EEG awake and asleep; Future  -     US HEAD NECK SOFT TISSUE THYROID; Future    Prostate cancer ([address]    Other orders  -     teriparatide (FORTEO) 620 MCG/2.48ML SOPN injection; Inject 0.08 mLs into the skin daily    During the examination I was trying to lean the patient forward and this was after I palpated his lymph node on the left. Patient then began to seizure-like activity with shaking which was uncontrollable. He needed steadying with the help of myself and his caregiver. He became very foggy but this was only for brief seconds. He did not lose control of his bowel or bladder. According to 1 caregiver the patient had very similar episode about 2 months ago. Last CT of the brain was done in 2021. I will do an MRI of the brain and order an EEG. I have ordered Forteo because of his significant osteoporosis with multiple vertebral crush fractures. His diarrhea seems to have resolved. His urinary symptoms have resolved. Patient is to be seen back in 2 months to be reassessed. Lab work will be obtained today  An electronic signature was used to authenticate this note.   --Reuben Mejia MD

## 2023-05-10 ENCOUNTER — OFFICE VISIT (OUTPATIENT)
Dept: RHEUMATOLOGY | Age: 88
End: 2023-05-10
Payer: MEDICARE

## 2023-05-10 VITALS — WEIGHT: 126 LBS | BODY MASS INDEX: 19.78 KG/M2 | HEIGHT: 67 IN

## 2023-05-10 DIAGNOSIS — M81.0 AGE-RELATED OSTEOPOROSIS WITHOUT CURRENT PATHOLOGICAL FRACTURE: Primary | ICD-10-CM

## 2023-05-10 DIAGNOSIS — M15.9 GENERALIZED OSTEOARTHRITIS: ICD-10-CM

## 2023-05-10 DIAGNOSIS — Z51.81 MEDICATION MONITORING ENCOUNTER: ICD-10-CM

## 2023-05-10 PROCEDURE — 1123F ACP DISCUSS/DSCN MKR DOCD: CPT | Performed by: INTERNAL MEDICINE

## 2023-05-10 PROCEDURE — 99214 OFFICE O/P EST MOD 30 MIN: CPT | Performed by: INTERNAL MEDICINE

## 2023-05-10 ASSESSMENT — ENCOUNTER SYMPTOMS
TROUBLE SWALLOWING: 0
ABDOMINAL PAIN: 0
COUGH: 0
VOMITING: 0
DIARRHEA: 0
NAUSEA: 0
SHORTNESS OF BREATH: 0
COLOR CHANGE: 0
BACK PAIN: 1

## 2023-05-10 NOTE — PROGRESS NOTES
Mila Paget 1/7/1928 is a 80 y.o. male, here for evaluation of the following chief complaint(s):  Follow-up (Patient here for follow up on osteoporosis. )         ASSESSMENT/PLAN:    Mila Paget 1/7/1928 is a 80 y.o. male seen in follow-up for osteoporosis. 1.  Osteoporosis-he has history of compression fracture. On bone density scan from February 2022 his bones are very thin. He has a lowest T score of -5.8 in the forearm and -4.3 in the hip. Given severe osteoporosis planned to put him on Forteo. Unfortunately his co-pay was too high and he would not qualify for patient assistance. We were able to get him on Prolia and he has had 1 shot thus far and tolerated it well. No fractures since last visit. We will continue Prolia and vitamin D supplementation and repeat bone density scan in February 2024. At that point we may revisit the idea of Forteo or possibly Evenity. 2.  Medication monitoring-we will update calcium level and vitamin D a week or so before his next Prolia. 3.  Osteoarthritis-avoid systemic NSAIDs due to Eliquis. 1. Age-related osteoporosis without current pathological fracture  -     Basic Metabolic Panel; Future  -     Vitamin D 25 Hydroxy; Future  2. Medication monitoring encounter  3. Generalized osteoarthritis        Return in about 9 months (around 2/10/2024). Subjective   SUBJECTIVE/OBJECTIVE:    HPI: Mila Paget 1/7/1928 is a 80 y.o. male seen in follow-up for severe osteoporosis. The plan was to put him on Forteo unfortunately his co-pay was too high. We were able to get him on Prolia and he has had 1 shot thus far and tolerated it well. He has not had any fractures since last visit. He is on vitamin D supplementation. Initial history: Patient has a history of compression fracture. He has never had a hip fracture. Bone density scan from February 2022 shows a lowest T score of -5.8 in the forearm and -4.3 in the hip.   He has never been

## 2023-05-18 ENCOUNTER — OFFICE VISIT (OUTPATIENT)
Dept: FAMILY MEDICINE CLINIC | Age: 88
End: 2023-05-18
Payer: MEDICARE

## 2023-05-18 ENCOUNTER — TELEPHONE (OUTPATIENT)
Dept: FAMILY MEDICINE CLINIC | Age: 88
End: 2023-05-18

## 2023-05-18 VITALS
BODY MASS INDEX: 19.78 KG/M2 | HEART RATE: 80 BPM | OXYGEN SATURATION: 95 % | TEMPERATURE: 98 F | HEIGHT: 67 IN | DIASTOLIC BLOOD PRESSURE: 60 MMHG | WEIGHT: 126 LBS | SYSTOLIC BLOOD PRESSURE: 100 MMHG

## 2023-05-18 DIAGNOSIS — N39.0 URINARY TRACT INFECTION WITH HEMATURIA, SITE UNSPECIFIED: Primary | ICD-10-CM

## 2023-05-18 DIAGNOSIS — R30.0 DYSURIA: ICD-10-CM

## 2023-05-18 DIAGNOSIS — R31.9 URINARY TRACT INFECTION WITH HEMATURIA, SITE UNSPECIFIED: Primary | ICD-10-CM

## 2023-05-18 LAB
BILIRUBIN, POC: NORMAL
BLOOD URINE, POC: NORMAL
CLARITY, POC: CLEAR
COLOR, POC: YELLOW
GLUCOSE URINE, POC: NORMAL
KETONES, POC: NORMAL
LEUKOCYTE EST, POC: NORMAL
NITRITE, POC: NORMAL
PH, POC: 6.5
PROTEIN, POC: >=300
SPECIFIC GRAVITY, POC: >=1.03
UROBILINOGEN, POC: 0.2

## 2023-05-18 PROCEDURE — 99214 OFFICE O/P EST MOD 30 MIN: CPT | Performed by: PHYSICIAN ASSISTANT

## 2023-05-18 PROCEDURE — 81002 URINALYSIS NONAUTO W/O SCOPE: CPT | Performed by: PHYSICIAN ASSISTANT

## 2023-05-18 PROCEDURE — 1123F ACP DISCUSS/DSCN MKR DOCD: CPT | Performed by: PHYSICIAN ASSISTANT

## 2023-05-18 RX ORDER — CIPROFLOXACIN 250 MG/1
250 TABLET, FILM COATED ORAL 2 TIMES DAILY
Qty: 10 TABLET | Refills: 0 | Status: SHIPPED | OUTPATIENT
Start: 2023-05-18 | End: 2023-05-23

## 2023-05-18 NOTE — PROGRESS NOTES
23  Nam Boles : 1928 Sex: male  Age 80 y.o. Subjective:  Chief Complaint   Patient presents with    Dysuria     Started 2 weeks ago got better and then came back. HPI:   Nam Boles , 80 y.o. male presents to [facility] care for evaluation of UTI symptoms    HPI  55-year-old male presents with a history of recurrent UTIs. The patient has had increased frequency, urgency and burning with urination ongoing for the last couple of days. He had the symptoms about 2 to 3 weeks ago. The patient was recently ill and placed on Macrobid. The patient does have a history of C. difficile. The patient is not having back pain, flank pain. No abdominal discomfort. He has not noted any blood in the urine. The patient is not having any fevers or chills. He is here with caregiver. ROS:   Unless otherwise stated in this report the patient's positive and negative responses for review of systems for constitutional, eyes, ENT, cardiovascular, respiratory, gastrointestinal, neurological, , musculoskeletal, and integument systems and related systems to the presenting problem are either stated in the history of present illness or were not pertinent or were negative for the symptoms and/or complaints related to the presenting medical problem. Positives and pertinent negatives as per HPI. All others reviewed and are negative.       PMH:     Past Medical History:   Diagnosis Date    C. difficile colitis 2023    History of prostate cancer 1970    radiation for 1 month and then seed implants     History of spinal fracture     [place] (hard of hearing)     Hypertension     Hypothyroidism 2004    Leg cramps     Prostate cancer ([address]     Pulmonary emboli ([address]        Past Surgical History:   Procedure Laterality Date    COLONOSCOPY      [place]    HAND TENDON SURGERY      HERNIA REPAIR  age 15    INGUINAL HERNIA REPAIR  12    RIGHT , WITH MESH    KNEE ARTHROSCOPY      MASTOID SURGERY

## 2023-05-26 ENCOUNTER — HOSPITAL ENCOUNTER (OUTPATIENT)
Dept: ULTRASOUND IMAGING | Age: 88
End: 2023-05-26
Payer: MEDICARE

## 2023-05-26 ENCOUNTER — HOSPITAL ENCOUNTER (OUTPATIENT)
Dept: MRI IMAGING | Age: 88
End: 2023-05-26
Payer: MEDICARE

## 2023-05-26 DIAGNOSIS — I10 ESSENTIAL HYPERTENSION: ICD-10-CM

## 2023-05-26 DIAGNOSIS — R56.9 SEIZURE (HCC): ICD-10-CM

## 2023-05-26 DIAGNOSIS — R41.0 CONFUSION: ICD-10-CM

## 2023-05-26 DIAGNOSIS — M80.80XD OTHER OSTEOPOROSIS WITH CURRENT PATHOLOGICAL FRACTURE WITH ROUTINE HEALING, SUBSEQUENT ENCOUNTER: ICD-10-CM

## 2023-05-26 DIAGNOSIS — E03.9 ACQUIRED HYPOTHYROIDISM: ICD-10-CM

## 2023-05-26 PROCEDURE — A9579 GAD-BASE MR CONTRAST NOS,1ML: HCPCS | Performed by: RADIOLOGY

## 2023-05-26 PROCEDURE — 76536 US EXAM OF HEAD AND NECK: CPT

## 2023-05-26 PROCEDURE — 6360000004 HC RX CONTRAST MEDICATION: Performed by: RADIOLOGY

## 2023-05-26 PROCEDURE — 70553 MRI BRAIN STEM W/O & W/DYE: CPT

## 2023-05-26 RX ADMIN — GADOTERIDOL 11 ML: 279.3 INJECTION, SOLUTION INTRAVENOUS at 14:51

## 2023-06-19 DIAGNOSIS — M81.0 AGE-RELATED OSTEOPOROSIS WITHOUT CURRENT PATHOLOGICAL FRACTURE: ICD-10-CM

## 2023-06-20 LAB
ANION GAP SERPL CALCULATED.3IONS-SCNC: 12 MMOL/L (ref 7–16)
BUN SERPL-MCNC: 19 MG/DL (ref 6–23)
CALCIUM SERPL-MCNC: 9.7 MG/DL (ref 8.6–10.2)
CHLORIDE SERPL-SCNC: 103 MMOL/L (ref 98–107)
CO2 SERPL-SCNC: 23 MMOL/L (ref 22–29)
CREAT SERPL-MCNC: 0.7 MG/DL (ref 0.7–1.2)
GLUCOSE SERPL-MCNC: 82 MG/DL (ref 74–99)
POTASSIUM SERPL-SCNC: 4.5 MMOL/L (ref 3.5–5)
SODIUM SERPL-SCNC: 138 MMOL/L (ref 132–146)
VITAMIN D 25-HYDROXY: 52 NG/ML (ref 30–100)

## 2023-06-28 ENCOUNTER — HOSPITAL ENCOUNTER (OUTPATIENT)
Dept: INFUSION THERAPY | Age: 88
Setting detail: INFUSION SERIES
End: 2023-06-28

## 2023-07-18 ENCOUNTER — OFFICE VISIT (OUTPATIENT)
Dept: FAMILY MEDICINE CLINIC | Age: 88
End: 2023-07-18
Payer: MEDICARE

## 2023-07-18 VITALS
BODY MASS INDEX: 19.42 KG/M2 | OXYGEN SATURATION: 96 % | HEART RATE: 80 BPM | TEMPERATURE: 97.5 F | SYSTOLIC BLOOD PRESSURE: 90 MMHG | DIASTOLIC BLOOD PRESSURE: 60 MMHG | WEIGHT: 124 LBS

## 2023-07-18 DIAGNOSIS — C61 PROSTATE CANCER (HCC): ICD-10-CM

## 2023-07-18 DIAGNOSIS — Z86.711 HX OF PULMONARY EMBOLUS: Chronic | ICD-10-CM

## 2023-07-18 DIAGNOSIS — I10 ESSENTIAL HYPERTENSION: Primary | ICD-10-CM

## 2023-07-18 DIAGNOSIS — Z79.01 CHRONIC ANTICOAGULATION: ICD-10-CM

## 2023-07-18 DIAGNOSIS — M19.90 ARTHRITIS: ICD-10-CM

## 2023-07-18 DIAGNOSIS — E03.9 ACQUIRED HYPOTHYROIDISM: ICD-10-CM

## 2023-07-18 DIAGNOSIS — M81.0 AGE-RELATED OSTEOPOROSIS WITHOUT CURRENT PATHOLOGICAL FRACTURE: ICD-10-CM

## 2023-07-18 PROCEDURE — 99214 OFFICE O/P EST MOD 30 MIN: CPT | Performed by: INTERNAL MEDICINE

## 2023-07-18 PROCEDURE — 1123F ACP DISCUSS/DSCN MKR DOCD: CPT | Performed by: INTERNAL MEDICINE

## 2023-07-18 NOTE — PROGRESS NOTES
Subjective:   HPI:  Follow up of Hospital problems/diagnosis(es): C. difficile colitis, pulmonary embolus    Inpatient course: Discharge summary reviewed- see chart. Interval history/Current status: 80year-old male admitted with abdominal pain, back pain flank pain shortness of breath. After work-up the patient was diagnosed with C. difficile colitis. He was also noted to have a pulmonary embolus. He was seen by pulmonary medicine. Patient will be been on Eliquis. Patient currently is on Prolia for his back discomfort. He has 8 vertebral crush fractures with 1 new fracture since last year. Patient will need a stronger agent. Previously they have refused this because of cost of the medication. The patient is denying any headache or visual change. He does have a tender lymph node of the neck area on the left. According to the caregivers the patient does get short of breath after several minutes of activity. He denies orthopnea or PND. He does have back pain. He denies any radicular type symptoms. He denies any chest pain, chest pressure or palpitations. He had no pleuritic pain or hemoptysis. Update July 18, 2023  Patient is seen by Dr. Rika Carney. He was placed on Prolia. There is still consideration because of the severity of his osteoporosis to place him on Forteo. Patient denies cardiac or respiratory symptoms. Recent evaluation by Dr. Tiffany Veloz with removal of melanoma. Patient according to the caregivers had many melanomas. Patient has not had recurrent seizure-like activity. He does have some degree of memory loss. His MRI and carotid ultrasound are reviewed. Patient has not had any further gross hematuria. He is still active and goes to work for several hours per day.     Patient Active Problem List   Diagnosis    Hypothyroidism    History of prostate cancer    [place] (hard of hearing)    History of DVT (deep vein thrombosis)    Hx of pulmonary embolus    Essential hypertension

## 2023-07-20 ENCOUNTER — OFFICE VISIT (OUTPATIENT)
Dept: ENT CLINIC | Age: 88
End: 2023-07-20
Payer: MEDICARE

## 2023-07-20 VITALS
HEIGHT: 67 IN | RESPIRATION RATE: 12 BRPM | DIASTOLIC BLOOD PRESSURE: 69 MMHG | WEIGHT: 124 LBS | HEART RATE: 74 BPM | BODY MASS INDEX: 19.46 KG/M2 | SYSTOLIC BLOOD PRESSURE: 111 MMHG | TEMPERATURE: 98.1 F | OXYGEN SATURATION: 91 %

## 2023-07-20 DIAGNOSIS — H61.23 EXCESSIVE CERUMEN IN BOTH EAR CANALS: Primary | ICD-10-CM

## 2023-07-20 PROCEDURE — 99213 OFFICE O/P EST LOW 20 MIN: CPT

## 2023-07-20 PROCEDURE — 69210 REMOVE IMPACTED EAR WAX UNI: CPT

## 2023-07-20 PROCEDURE — 1123F ACP DISCUSS/DSCN MKR DOCD: CPT

## 2023-07-20 ASSESSMENT — ENCOUNTER SYMPTOMS
SORE THROAT: 0
COUGH: 0
ALLERGIC/IMMUNOLOGIC NEGATIVE: 1
SINUS PRESSURE: 0
RHINORRHEA: 0
SHORTNESS OF BREATH: 0
BACK PAIN: 0
EYE DISCHARGE: 0
EYE PAIN: 0
VOMITING: 0
DIARRHEA: 0

## 2023-07-20 NOTE — PROGRESS NOTES
Subjective:      Patient ID:  Greg Sanchez is a 80 y.o. male. HPI:    Pt presents with a history of cerumen impaction removal.   The patients ear was last cleaned 9 month(s) ago. The patient was not using ear drops to loosen wax immediately prior to this visit. Hearing aids: yes, is not wearing , does not feel they help        Past Medical History:   Diagnosis Date    C. difficile colitis 4/21/2023    History of prostate cancer 1970    radiation for 1 month and then seed implants     History of spinal fracture     [place] (hard of hearing)     Hypertension     Hypothyroidism 2004    Leg cramps     Prostate cancer ([address])     Pulmonary emboli (HCC)      Past Surgical History:   Procedure Laterality Date    COLONOSCOPY  2014    [place]    HAND TENDON SURGERY      HERNIA REPAIR  age 15    INGUINAL HERNIA REPAIR  6/7/12    RIGHT , WITH MESH    KNEE ARTHROSCOPY      MASTOID SURGERY      OTHER SURGICAL HISTORY  approx 8 yrs ago    prostate radiation and implanted seeds    OTHER SURGICAL HISTORY Right 04/30/2017    head laceration      Family History   Problem Relation Age of Onset    Heart Attack Daughter     Stroke Mother     Cancer Father         lung, smoking     Cancer Brother         throat, smoking      Social History     Socioeconomic History    Marital status:      Spouse name: None    Number of children: None    Years of education: None    Highest education level: None   Occupational History    Occupation: business owner   Tobacco Use    Smoking status: Never    Smokeless tobacco: Never   Vaping Use    Vaping Use: Never used   Substance and Sexual Activity    Alcohol use: Yes     Comment: socially    Drug use: No    Sexual activity: Not Currently     Partners: Female   Social History Narrative    ** Merged History Encounter **    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 He has been on the same location on [employer] for many years.   His wife has had a stroke and he is the

## 2023-08-10 DIAGNOSIS — E03.9 ACQUIRED HYPOTHYROIDISM: ICD-10-CM

## 2023-08-10 RX ORDER — LEVOTHYROXINE SODIUM 0.1 MG/1
TABLET ORAL
Qty: 90 TABLET | Refills: 1 | Status: SHIPPED | OUTPATIENT
Start: 2023-08-10

## 2023-08-20 DIAGNOSIS — E03.9 ACQUIRED HYPOTHYROIDISM: ICD-10-CM

## 2023-08-20 DIAGNOSIS — I10 ESSENTIAL HYPERTENSION: Chronic | ICD-10-CM

## 2023-08-20 DIAGNOSIS — Z79.01 CHRONIC ANTICOAGULATION: ICD-10-CM

## 2023-08-21 NOTE — TELEPHONE ENCOUNTER
Last Appointment:  7/18/2023    Future appts:  Future Appointments   Date Time Provider [address]   10/24/2023  1:00 PM Valdemar Nicholas  [address]   1/25/2024 10:00 AM Jesus Dumont APRN - CNP [facility]   2/12/2024 10:40 AM Nancy Helm DO BDM [facility]

## 2023-08-25 RX ORDER — LISINOPRIL 5 MG/1
TABLET ORAL
Qty: 90 TABLET | Refills: 1 | Status: SHIPPED | OUTPATIENT
Start: 2023-08-25

## 2023-08-25 NOTE — TELEPHONE ENCOUNTER
Marcos Shah calling for a new script for Lisinopril to be sent to [place] in [facility].       Last Appointment:  7/18/2023  Future Appointments   Date Time Provider [address]   10/24/2023  1:00 PM Terrance Kaiser MD 1200 [facility]   1/25/2024 10:00 AM RAPHAEL Ross - CNP [facility]   2/12/2024 10:40 AM Jacob Helm DO BDM [facility]

## 2023-09-06 ENCOUNTER — TELEPHONE (OUTPATIENT)
Dept: FAMILY MEDICINE CLINIC | Age: 88
End: 2023-09-06

## 2023-09-06 NOTE — TELEPHONE ENCOUNTER
Fabrice Orn calling for an order for a new Handicap Placard to be mailed to his home. I have this ready to print.       Last Appointment:  7/18/2023  Future Appointments   Date Time Provider [address] [place] [address]   10/24/2023  1:00 PM Austin Henderson MD 1200 [facility]   1/25/2024 10:00 AM RAPHAEL De La Cruz - CNP [facility]   2/12/2024 10:40 AM Gemma Helm DO BDM [facility]

## 2023-09-19 ENCOUNTER — OFFICE VISIT (OUTPATIENT)
Dept: FAMILY MEDICINE CLINIC | Age: 88
End: 2023-09-19
Payer: MEDICARE

## 2023-09-19 VITALS
BODY MASS INDEX: 19.46 KG/M2 | HEART RATE: 89 BPM | SYSTOLIC BLOOD PRESSURE: 112 MMHG | HEIGHT: 67 IN | DIASTOLIC BLOOD PRESSURE: 68 MMHG | OXYGEN SATURATION: 97 % | TEMPERATURE: 97.8 F | WEIGHT: 124 LBS

## 2023-09-19 DIAGNOSIS — R31.9 URINARY TRACT INFECTION WITH HEMATURIA, SITE UNSPECIFIED: Primary | ICD-10-CM

## 2023-09-19 DIAGNOSIS — M54.42 ACUTE LEFT-SIDED LOW BACK PAIN WITH LEFT-SIDED SCIATICA: ICD-10-CM

## 2023-09-19 DIAGNOSIS — R31.9 HEMATURIA, UNSPECIFIED TYPE: ICD-10-CM

## 2023-09-19 DIAGNOSIS — N39.0 URINARY TRACT INFECTION WITH HEMATURIA, SITE UNSPECIFIED: Primary | ICD-10-CM

## 2023-09-19 LAB
BILIRUBIN, POC: NEGATIVE
BLOOD URINE, POC: NORMAL
CLARITY, POC: CLEAR
COLOR, POC: YELLOW
GLUCOSE URINE, POC: NEGATIVE
KETONES, POC: NEGATIVE
LEUKOCYTE EST, POC: NEGATIVE
NITRITE, POC: NEGATIVE
PH, POC: 5.5
PROTEIN, POC: NEGATIVE
SPECIFIC GRAVITY, POC: 1.02
UROBILINOGEN, POC: 0.2

## 2023-09-19 PROCEDURE — 99214 OFFICE O/P EST MOD 30 MIN: CPT | Performed by: PHYSICIAN ASSISTANT

## 2023-09-19 PROCEDURE — 1123F ACP DISCUSS/DSCN MKR DOCD: CPT | Performed by: PHYSICIAN ASSISTANT

## 2023-09-19 PROCEDURE — 81002 URINALYSIS NONAUTO W/O SCOPE: CPT | Performed by: PHYSICIAN ASSISTANT

## 2023-09-19 RX ORDER — CIPROFLOXACIN 250 MG/1
250 TABLET, FILM COATED ORAL 2 TIMES DAILY
Qty: 14 TABLET | Refills: 0 | Status: SHIPPED | OUTPATIENT
Start: 2023-09-19 | End: 2023-09-26

## 2023-09-19 NOTE — PROGRESS NOTES
BY MARCUS     cefTAZidime-avibactam Sensitive <=^0.12 mcg/mL BACTERIAL SUSCEPTIBILITY PANEL BY MARCUS     gentamicin Sensitive <=^1 mcg/mL BACTERIAL SUSCEPTIBILITY PANEL BY MARCUS     levofloxacin Sensitive <=^0.12 mcg/mL BACTERIAL SUSCEPTIBILITY PANEL BY MARCUS     meropenem Sensitive <=^0.25 mcg/mL BACTERIAL SUSCEPTIBILITY PANEL BY MARCUS     nitrofurantoin Sensitive <=^16 mcg/mL BACTERIAL SUSCEPTIBILITY PANEL BY MARCUS     piperacillin-tazobactam Sensitive <=^4 mcg/mL BACTERIAL SUSCEPTIBILITY PANEL BY MARCUS     trimethoprim-sulfamethoxazole Sensitive <=^20 mcg/mL BACTERIAL SUSCEPTIBILITY PANEL BY MARCUS           Narrative  Performed by: [facility]  Source: URINE       Site: Urine Clean Catch                Specimen Collected: 05/18/23 12:00 EDT Last Resulted: 05/21/23 07:37 EDT           Results for orders placed or performed in visit on 09/19/23   POCT Urinalysis no Micro   Result Value Ref Range    Color, UA yellow     Clarity, UA clear     Glucose, UA POC negative     Bilirubin, UA negative     Ketones, UA negative     Spec Grav, UA 1.020     Blood, UA POC moderate     pH, UA 5.5     Protein, UA POC negative     Urobilinogen, UA 0.2     Leukocytes, UA negative     Nitrite, UA negative      US RETROPERITONEAL LIMITED    Result Date: 9/19/2023  EXAMINATION: ULTRASOUND OF THE KIDNEYS 9/19/2023 11:58 am COMPARISON: None. HISTORY: ORDERING SYSTEM PROVIDED HISTORY: Acute left-sided low back pain with left-sided sciatica TECHNOLOGIST PROVIDED HISTORY: Reason for exam:->left back pain, hematuria What reading provider will be dictating this exam?->CRC FINDINGS: The right kidney measures 9.6 cm in length and the left kidney measures 10.6 cm in length. Kidneys demonstrate normal cortical echogenicity. No hydronephrosis or intrarenal stones. No focal lesions. Unremarkable ultrasound of the kidneys. Medical Decision Making:     Vital signs reviewed    Past medical history reviewed.     Allergies

## 2023-10-13 ENCOUNTER — OFFICE VISIT (OUTPATIENT)
Dept: FAMILY MEDICINE CLINIC | Age: 88
End: 2023-10-13
Payer: MEDICARE

## 2023-10-13 ENCOUNTER — APPOINTMENT (OUTPATIENT)
Dept: CT IMAGING | Age: 88
End: 2023-10-13
Payer: MEDICARE

## 2023-10-13 ENCOUNTER — HOSPITAL ENCOUNTER (OUTPATIENT)
Age: 88
Setting detail: OBSERVATION
Discharge: HOME | End: 2023-10-14
Attending: STUDENT IN AN ORGANIZED HEALTH CARE EDUCATION/TRAINING PROGRAM | Admitting: INTERNAL MEDICINE
Payer: MEDICARE

## 2023-10-13 VITALS
OXYGEN SATURATION: 98 % | RESPIRATION RATE: 18 BRPM | WEIGHT: 123 LBS | DIASTOLIC BLOOD PRESSURE: 72 MMHG | HEIGHT: 67 IN | SYSTOLIC BLOOD PRESSURE: 114 MMHG | BODY MASS INDEX: 19.3 KG/M2 | HEART RATE: 78 BPM | TEMPERATURE: 98.2 F

## 2023-10-13 DIAGNOSIS — N36.8 BLEEDING FROM URETHRA IN MALE: Primary | ICD-10-CM

## 2023-10-13 DIAGNOSIS — N17.9 AKI (ACUTE KIDNEY INJURY) (HCC): ICD-10-CM

## 2023-10-13 DIAGNOSIS — Z79.01 CURRENT USE OF LONG TERM ANTICOAGULATION: ICD-10-CM

## 2023-10-13 DIAGNOSIS — C61 PROSTATE CANCER (HCC): ICD-10-CM

## 2023-10-13 DIAGNOSIS — R33.9 URINARY RETENTION: Primary | ICD-10-CM

## 2023-10-13 DIAGNOSIS — I95.9 HYPOTENSION, UNSPECIFIED HYPOTENSION TYPE: ICD-10-CM

## 2023-10-13 DIAGNOSIS — N30.01 ACUTE CYSTITIS WITH HEMATURIA: ICD-10-CM

## 2023-10-13 PROBLEM — A04.72 C. DIFFICILE DIARRHEA: Status: RESOLVED | Noted: 2023-04-21 | Resolved: 2023-10-13

## 2023-10-13 PROBLEM — N39.0 COMPLICATED UTI (URINARY TRACT INFECTION): Status: ACTIVE | Noted: 2023-10-13

## 2023-10-13 PROBLEM — N39.0 UTI (URINARY TRACT INFECTION): Status: ACTIVE | Noted: 2023-10-13

## 2023-10-13 LAB
ABO + RH BLD: NORMAL
ALBUMIN SERPL-MCNC: 4.2 G/DL (ref 3.5–5.2)
ALP SERPL-CCNC: 101 U/L (ref 40–129)
ALT SERPL-CCNC: 10 U/L (ref 0–40)
ANION GAP SERPL CALCULATED.3IONS-SCNC: 11 MMOL/L (ref 7–16)
ARM BAND NUMBER: NORMAL
AST SERPL-CCNC: 20 U/L (ref 0–39)
BACTERIA URNS QL MICRO: ABNORMAL
BASOPHILS # BLD: 0.02 K/UL (ref 0–0.2)
BASOPHILS NFR BLD: 0 % (ref 0–2)
BILIRUB SERPL-MCNC: 0.4 MG/DL (ref 0–1.2)
BILIRUB UR QL STRIP: ABNORMAL
BILIRUB UR QL STRIP: ABNORMAL
BLOOD BANK SAMPLE EXPIRATION: NORMAL
BLOOD GROUP ANTIBODIES SERPL: NEGATIVE
BUN SERPL-MCNC: 40 MG/DL (ref 6–23)
CALCIUM SERPL-MCNC: 9.9 MG/DL (ref 8.6–10.2)
CHLORIDE SERPL-SCNC: 102 MMOL/L (ref 98–107)
CLARITY UR: ABNORMAL
CLARITY UR: ABNORMAL
CO2 SERPL-SCNC: 25 MMOL/L (ref 22–29)
COLOR UR: ABNORMAL
COLOR UR: ABNORMAL
CREAT SERPL-MCNC: 1.2 MG/DL (ref 0.7–1.2)
EOSINOPHIL # BLD: 0.11 K/UL (ref 0.05–0.5)
EOSINOPHILS RELATIVE PERCENT: 1 % (ref 0–6)
ERYTHROCYTE [DISTWIDTH] IN BLOOD BY AUTOMATED COUNT: 14.3 % (ref 11.5–15)
GFR SERPL CREATININE-BSD FRML MDRD: 58 ML/MIN/1.73M2
GLUCOSE SERPL-MCNC: 130 MG/DL (ref 74–99)
GLUCOSE UR STRIP-MCNC: 250 MG/DL
GLUCOSE UR STRIP-MCNC: ABNORMAL MG/DL
HCT VFR BLD AUTO: 40.3 % (ref 37–54)
HGB BLD-MCNC: 12.7 G/DL (ref 12.5–16.5)
HGB UR QL STRIP.AUTO: ABNORMAL
HGB UR QL STRIP.AUTO: ABNORMAL
IMM GRANULOCYTES # BLD AUTO: <0.03 K/UL (ref 0–0.58)
IMM GRANULOCYTES NFR BLD: 0 % (ref 0–5)
KETONES UR STRIP-MCNC: ABNORMAL MG/DL
KETONES UR STRIP-MCNC: ABNORMAL MG/DL
LACTATE BLDV-SCNC: 1.6 MMOL/L (ref 0.5–2.2)
LEUKOCYTE ESTERASE UR QL STRIP: ABNORMAL
LEUKOCYTE ESTERASE UR QL STRIP: ABNORMAL
LIPASE SERPL-CCNC: 23 U/L (ref 13–60)
LYMPHOCYTES NFR BLD: 1.8 K/UL (ref 1.5–4)
LYMPHOCYTES RELATIVE PERCENT: 24 % (ref 20–42)
MCH RBC QN AUTO: 28.4 PG (ref 26–35)
MCHC RBC AUTO-ENTMCNC: 31.5 G/DL (ref 32–34.5)
MCV RBC AUTO: 90.2 FL (ref 80–99.9)
MONOCYTES NFR BLD: 0.8 K/UL (ref 0.1–0.95)
MONOCYTES NFR BLD: 11 % (ref 2–12)
NEUTROPHILS NFR BLD: 64 % (ref 43–80)
NEUTS SEG NFR BLD: 4.86 K/UL (ref 1.8–7.3)
NITRITE UR QL STRIP: ABNORMAL
NITRITE UR QL STRIP: NEGATIVE
PH UR STRIP: 6.5 [PH] (ref 5–9)
PH UR STRIP: ABNORMAL [PH] (ref 5–9)
PLATELET # BLD AUTO: 285 K/UL (ref 130–450)
PMV BLD AUTO: 9.3 FL (ref 7–12)
POTASSIUM SERPL-SCNC: 4.5 MMOL/L (ref 3.5–5)
PROT SERPL-MCNC: 7.3 G/DL (ref 6.4–8.3)
PROT UR STRIP-MCNC: >=300 MG/DL
PROT UR STRIP-MCNC: ABNORMAL MG/DL
RBC # BLD AUTO: 4.47 M/UL (ref 3.8–5.8)
RBC #/AREA URNS HPF: ABNORMAL /HPF
RBC #/AREA URNS HPF: ABNORMAL /HPF
SODIUM SERPL-SCNC: 138 MMOL/L (ref 132–146)
SP GR UR STRIP: <1.005 (ref 1–1.03)
SP GR UR STRIP: ABNORMAL (ref 1–1.03)
UROBILINOGEN UR STRIP-ACNC: 2 EU/DL (ref 0–1)
UROBILINOGEN UR STRIP-ACNC: ABNORMAL EU/DL (ref 0–1)
WBC #/AREA URNS HPF: ABNORMAL /HPF
WBC #/AREA URNS HPF: ABNORMAL /HPF
WBC OTHER # BLD: 7.6 K/UL (ref 4.5–11.5)

## 2023-10-13 PROCEDURE — 83605 ASSAY OF LACTIC ACID: CPT

## 2023-10-13 PROCEDURE — 86850 RBC ANTIBODY SCREEN: CPT

## 2023-10-13 PROCEDURE — 96361 HYDRATE IV INFUSION ADD-ON: CPT

## 2023-10-13 PROCEDURE — 99285 EMERGENCY DEPT VISIT HI MDM: CPT

## 2023-10-13 PROCEDURE — G0378 HOSPITAL OBSERVATION PER HR: HCPCS

## 2023-10-13 PROCEDURE — 80053 COMPREHEN METABOLIC PANEL: CPT

## 2023-10-13 PROCEDURE — 1200000000 HC SEMI PRIVATE

## 2023-10-13 PROCEDURE — 51798 US URINE CAPACITY MEASURE: CPT

## 2023-10-13 PROCEDURE — 83690 ASSAY OF LIPASE: CPT

## 2023-10-13 PROCEDURE — 1123F ACP DISCUSS/DSCN MKR DOCD: CPT | Performed by: PHYSICIAN ASSISTANT

## 2023-10-13 PROCEDURE — 6360000002 HC RX W HCPCS: Performed by: STUDENT IN AN ORGANIZED HEALTH CARE EDUCATION/TRAINING PROGRAM

## 2023-10-13 PROCEDURE — 87086 URINE CULTURE/COLONY COUNT: CPT

## 2023-10-13 PROCEDURE — 86900 BLOOD TYPING SEROLOGIC ABO: CPT

## 2023-10-13 PROCEDURE — 81001 URINALYSIS AUTO W/SCOPE: CPT

## 2023-10-13 PROCEDURE — 74176 CT ABD & PELVIS W/O CONTRAST: CPT

## 2023-10-13 PROCEDURE — 86901 BLOOD TYPING SEROLOGIC RH(D): CPT

## 2023-10-13 PROCEDURE — 99214 OFFICE O/P EST MOD 30 MIN: CPT | Performed by: PHYSICIAN ASSISTANT

## 2023-10-13 PROCEDURE — 96374 THER/PROPH/DIAG INJ IV PUSH: CPT

## 2023-10-13 PROCEDURE — 2580000003 HC RX 258: Performed by: STUDENT IN AN ORGANIZED HEALTH CARE EDUCATION/TRAINING PROGRAM

## 2023-10-13 PROCEDURE — 85025 COMPLETE CBC W/AUTO DIFF WBC: CPT

## 2023-10-13 RX ORDER — ONDANSETRON 2 MG/ML
4 INJECTION INTRAMUSCULAR; INTRAVENOUS EVERY 6 HOURS PRN
Status: DISCONTINUED | OUTPATIENT
Start: 2023-10-13 | End: 2023-10-14 | Stop reason: HOSPADM

## 2023-10-13 RX ORDER — SODIUM CHLORIDE 0.9 % (FLUSH) 0.9 %
10 SYRINGE (ML) INJECTION EVERY 12 HOURS SCHEDULED
Status: DISCONTINUED | OUTPATIENT
Start: 2023-10-13 | End: 2023-10-14 | Stop reason: HOSPADM

## 2023-10-13 RX ORDER — SODIUM CHLORIDE 0.9 % (FLUSH) 0.9 %
SYRINGE (ML) INJECTION
Status: DISPENSED
Start: 2023-10-13 | End: 2023-10-14

## 2023-10-13 RX ORDER — SENNOSIDES A AND B 8.6 MG/1
1 TABLET, FILM COATED ORAL DAILY PRN
Status: DISCONTINUED | OUTPATIENT
Start: 2023-10-13 | End: 2023-10-14 | Stop reason: HOSPADM

## 2023-10-13 RX ORDER — POTASSIUM CHLORIDE 20 MEQ/1
40 TABLET, EXTENDED RELEASE ORAL PRN
Status: DISCONTINUED | OUTPATIENT
Start: 2023-10-13 | End: 2023-10-14 | Stop reason: HOSPADM

## 2023-10-13 RX ORDER — 0.9 % SODIUM CHLORIDE 0.9 %
1000 INTRAVENOUS SOLUTION INTRAVENOUS ONCE
Status: COMPLETED | OUTPATIENT
Start: 2023-10-13 | End: 2023-10-14

## 2023-10-13 RX ORDER — SODIUM CHLORIDE 9 MG/ML
INJECTION, SOLUTION INTRAVENOUS CONTINUOUS
Status: DISCONTINUED | OUTPATIENT
Start: 2023-10-13 | End: 2023-10-14

## 2023-10-13 RX ORDER — MAGNESIUM SULFATE IN WATER 40 MG/ML
2000 INJECTION, SOLUTION INTRAVENOUS PRN
Status: DISCONTINUED | OUTPATIENT
Start: 2023-10-13 | End: 2023-10-14 | Stop reason: HOSPADM

## 2023-10-13 RX ORDER — LEVOTHYROXINE SODIUM 0.1 MG/1
100 TABLET ORAL DAILY
Status: DISCONTINUED | OUTPATIENT
Start: 2023-10-14 | End: 2023-10-14 | Stop reason: HOSPADM

## 2023-10-13 RX ORDER — ACETAMINOPHEN 325 MG/1
650 TABLET ORAL EVERY 6 HOURS PRN
Status: DISCONTINUED | OUTPATIENT
Start: 2023-10-13 | End: 2023-10-14 | Stop reason: HOSPADM

## 2023-10-13 RX ORDER — TERIPARATIDE 250 UG/ML
20 INJECTION, SOLUTION SUBCUTANEOUS DAILY
Status: DISCONTINUED | OUTPATIENT
Start: 2023-10-14 | End: 2023-10-14 | Stop reason: HOSPADM

## 2023-10-13 RX ORDER — LISINOPRIL 5 MG/1
5 TABLET ORAL DAILY
Status: DISCONTINUED | OUTPATIENT
Start: 2023-10-14 | End: 2023-10-14 | Stop reason: HOSPADM

## 2023-10-13 RX ORDER — ONDANSETRON 4 MG/1
4 TABLET, ORALLY DISINTEGRATING ORAL EVERY 8 HOURS PRN
Status: DISCONTINUED | OUTPATIENT
Start: 2023-10-13 | End: 2023-10-14 | Stop reason: HOSPADM

## 2023-10-13 RX ORDER — ACETAMINOPHEN 650 MG/1
650 SUPPOSITORY RECTAL EVERY 6 HOURS PRN
Status: DISCONTINUED | OUTPATIENT
Start: 2023-10-13 | End: 2023-10-14 | Stop reason: HOSPADM

## 2023-10-13 RX ORDER — SODIUM CHLORIDE 0.9 % (FLUSH) 0.9 %
10 SYRINGE (ML) INJECTION PRN
Status: DISCONTINUED | OUTPATIENT
Start: 2023-10-13 | End: 2023-10-14 | Stop reason: HOSPADM

## 2023-10-13 RX ORDER — SODIUM CHLORIDE 9 MG/ML
INJECTION, SOLUTION INTRAVENOUS PRN
Status: DISCONTINUED | OUTPATIENT
Start: 2023-10-13 | End: 2023-10-14 | Stop reason: HOSPADM

## 2023-10-13 RX ORDER — POTASSIUM CHLORIDE 7.45 MG/ML
10 INJECTION INTRAVENOUS PRN
Status: DISCONTINUED | OUTPATIENT
Start: 2023-10-13 | End: 2023-10-14 | Stop reason: HOSPADM

## 2023-10-13 RX ADMIN — SODIUM CHLORIDE 1000 ML: 9 INJECTION, SOLUTION INTRAVENOUS at 22:31

## 2023-10-13 RX ADMIN — WATER 2000 MG: 1 INJECTION INTRAMUSCULAR; INTRAVENOUS; SUBCUTANEOUS at 22:25

## 2023-10-13 ASSESSMENT — PAIN - FUNCTIONAL ASSESSMENT
PAIN_FUNCTIONAL_ASSESSMENT: NONE - DENIES PAIN

## 2023-10-13 ASSESSMENT — LIFESTYLE VARIABLES
HOW MANY STANDARD DRINKS CONTAINING ALCOHOL DO YOU HAVE ON A TYPICAL DAY: 1 OR 2
HOW OFTEN DO YOU HAVE A DRINK CONTAINING ALCOHOL: 4 OR MORE TIMES A WEEK

## 2023-10-13 NOTE — PROGRESS NOTES
management. Caregiver agreed with this plan and will take him immediately by private vehicle. Pt left our office in stable condition. Further disposition to follow. All questions answered. Anabel Cat PA-C    **This report was transcribed using voice recognition software. Every effort was made to ensure accuracy; however, inadvertent computerized transcription errors may be present.

## 2023-10-13 NOTE — ED NOTES
Department of Emergency Medicine  FIRST PROVIDER TRIAGE NOTE             Independent MLP           10/13/23  2:37 PM EDT    Date of Encounter: 10/13/23   MRN: [id number]      HPI: Sallie Sáurez is a 80 y.o. male who presents to the ED for Hematuria (Bleeding from penis since last night / on eliquis )   HEMATURIA. SENT IN FROM PCP. HISTORY OF PROSTATE CA. HAS BEEN HAVING DIFFICULTY INITIATING URINARY STREAM.     ROS: Negative for cp or sob. PE: Gen Appearance/Constitutional: alert  HEENT: NC/NT. PERRLA,  Airway patent. Initial Plan of Care: All treatment areas with department are currently occupied. Plan to order/Initiate the following while awaiting opening in ED.   Initiate Treatment-Testing, Proceed toTreatment Area When Bed Available for ED Attending/EMIL to Continue Care    Electronically signed by RAPHAEL Weiss CNP   DD: 10/13/23      RAPHAEL Weiss CNP  10/13/23 1080

## 2023-10-13 NOTE — ED PROVIDER NOTES
[address] ENCOUNTER        Pt Name: Tai Islas  MRN: [id number]  [address] [facility] 1/7/1928  Date of evaluation: 10/13/2023  Provider: Prachi Chao DO  PCP: Bryson Yan MD  Note Started: 5:03 PM EDT 10/13/23    CHIEF COMPLAINT       Chief Complaint   Patient presents with    Hematuria     Bleeding from penis since last night / on eliquis        HISTORY OF PRESENT ILLNESS: 1 or more Elements   History From: Patient and caregiver    Limitations to history: Hard of hearing. Tai Islas is a 80 y.o. male with past medical history of hypothyroidism, prostate cancer not on active treatment, hypertension, PE on Eliquis and arthritis who presents to the emergency department due to hematuria. Symptoms have been ongoing since yesterday. Patient states that he has had evelyn red blood when he urinates since last night. He is on Eliquis currently. He has been taking his medication as prescribed. Patient does also endorse vague abdominal pain. Pain is in his lower abdomen. It is dull, intermittent and nonradiating. Nothing in particular makes it better or worse. It is mild to moderate in severity. Caregiver is at chair side and provides of the history as well. Apparently, patient has been treated for multiple UTIs recently. She states that patient has had blood in his urine before but this is more than what has happened in the past.  Caregiver states that patient has been more fatigued but patient is denying any other symptoms for the patient including fever, chills, nausea, vomiting, headache, lightheadedness, dizziness, syncope, constipation, diarrhea, chest pain or shortness of breath. On initial assessment, patient is nontoxic-appearing and in no acute distress.     Nursing Notes were all reviewed and agreed with or any disagreements were addressed in the HPI.    ROS:   Pertinent positives and negatives are stated (LUPRON) injection 45 mg (has no administration in time range)   levothyroxine (SYNTHROID) tablet 100 mcg (has no administration in time range)   lisinopril (PRINIVIL;ZESTRIL) tablet 5 mg (has no administration in time range)   teriparatide (FORTEO) injection 20 mcg (has no administration in time range)   sodium chloride flush 0.9 % injection 10 mL (has no administration in time range)   sodium chloride flush 0.9 % injection 10 mL (has no administration in time range)   0.9 % sodium chloride infusion (has no administration in time range)   potassium chloride (KLOR-CON M) extended release tablet 40 mEq (has no administration in time range)     Or   potassium bicarb-citric acid (EFFER-K) effervescent tablet 40 mEq (has no administration in time range)     Or   potassium chloride 10 mEq/100 mL IVPB (Peripheral Line) (has no administration in time range)   magnesium sulfate 2000 mg in 50 mL IVPB premix (has no administration in time range)   ondansetron (ZOFRAN-ODT) disintegrating tablet 4 mg (has no administration in time range)     Or   ondansetron (ZOFRAN) injection 4 mg (has no administration in time range)   senna (SENOKOT) tablet 8.6 mg (has no administration in time range)   acetaminophen (TYLENOL) tablet 650 mg (has no administration in time range)     Or   acetaminophen (TYLENOL) suppository 650 mg (has no administration in time range)   cefTRIAXone (ROCEPHIN) 1,000 mg in sterile water 10 mL IV syringe (has no administration in time range)   0.9 % sodium chloride infusion (has no administration in time range)       Medical Decision Making/Differential Diagnosis:    CC/HPI Summary, Pertinent Physical Exam Findings, Social Determinants of health, Records Reviewed, DDx, testing done/not done, ED Course, Reassessment, disposition considerations/shared decision making with patient, consults, disposition:        Medical Decision Making:   I, Dr. Gudelia France am the resident physician of record.       History From:

## 2023-10-14 VITALS
DIASTOLIC BLOOD PRESSURE: 50 MMHG | BODY MASS INDEX: 18.89 KG/M2 | SYSTOLIC BLOOD PRESSURE: 96 MMHG | RESPIRATION RATE: 16 BRPM | HEART RATE: 75 BPM | HEIGHT: 67 IN | WEIGHT: 120.37 LBS | TEMPERATURE: 98.7 F | OXYGEN SATURATION: 96 %

## 2023-10-14 PROBLEM — N39.0 UTI (URINARY TRACT INFECTION): Status: RESOLVED | Noted: 2023-10-13 | Resolved: 2023-10-14

## 2023-10-14 PROBLEM — R31.9 HEMATURIA: Status: ACTIVE | Noted: 2023-10-14

## 2023-10-14 LAB
ALBUMIN SERPL-MCNC: 3.2 G/DL (ref 3.5–5.2)
ALP SERPL-CCNC: 73 U/L (ref 40–129)
ALT SERPL-CCNC: 7 U/L (ref 0–40)
ANION GAP SERPL CALCULATED.3IONS-SCNC: 9 MMOL/L (ref 7–16)
AST SERPL-CCNC: 16 U/L (ref 0–39)
BASOPHILS # BLD: 0.02 K/UL (ref 0–0.2)
BASOPHILS NFR BLD: 0 % (ref 0–2)
BILIRUB SERPL-MCNC: 0.3 MG/DL (ref 0–1.2)
BUN SERPL-MCNC: 38 MG/DL (ref 6–23)
CALCIUM SERPL-MCNC: 8.8 MG/DL (ref 8.6–10.2)
CHLORIDE SERPL-SCNC: 108 MMOL/L (ref 98–107)
CO2 SERPL-SCNC: 24 MMOL/L (ref 22–29)
CREAT SERPL-MCNC: 1 MG/DL (ref 0.7–1.2)
EOSINOPHIL # BLD: 0.09 K/UL (ref 0.05–0.5)
EOSINOPHILS RELATIVE PERCENT: 1 % (ref 0–6)
ERYTHROCYTE [DISTWIDTH] IN BLOOD BY AUTOMATED COUNT: 14.4 % (ref 11.5–15)
GFR SERPL CREATININE-BSD FRML MDRD: >60 ML/MIN/1.73M2
GLUCOSE SERPL-MCNC: 120 MG/DL (ref 74–99)
HCT VFR BLD AUTO: 31.5 % (ref 37–54)
HGB BLD-MCNC: 10 G/DL (ref 12.5–16.5)
IMM GRANULOCYTES # BLD AUTO: <0.03 K/UL (ref 0–0.58)
IMM GRANULOCYTES NFR BLD: 0 % (ref 0–5)
LYMPHOCYTES NFR BLD: 1.38 K/UL (ref 1.5–4)
LYMPHOCYTES RELATIVE PERCENT: 21 % (ref 20–42)
MCH RBC QN AUTO: 28.8 PG (ref 26–35)
MCHC RBC AUTO-ENTMCNC: 31.7 G/DL (ref 32–34.5)
MCV RBC AUTO: 90.8 FL (ref 80–99.9)
MONOCYTES NFR BLD: 0.75 K/UL (ref 0.1–0.95)
MONOCYTES NFR BLD: 11 % (ref 2–12)
NEUTROPHILS NFR BLD: 67 % (ref 43–80)
NEUTS SEG NFR BLD: 4.47 K/UL (ref 1.8–7.3)
PLATELET # BLD AUTO: 196 K/UL (ref 130–450)
PMV BLD AUTO: 9.3 FL (ref 7–12)
POTASSIUM SERPL-SCNC: 4.1 MMOL/L (ref 3.5–5)
PROT SERPL-MCNC: 5.4 G/DL (ref 6.4–8.3)
PSA SERPL-MCNC: 8.11 NG/ML (ref 0–4)
RBC # BLD AUTO: 3.47 M/UL (ref 3.8–5.8)
SODIUM SERPL-SCNC: 141 MMOL/L (ref 132–146)
WBC OTHER # BLD: 6.7 K/UL (ref 4.5–11.5)

## 2023-10-14 PROCEDURE — 85025 COMPLETE CBC W/AUTO DIFF WBC: CPT

## 2023-10-14 PROCEDURE — 2580000003 HC RX 258: Performed by: NURSE PRACTITIONER

## 2023-10-14 PROCEDURE — 6360000002 HC RX W HCPCS: Performed by: NURSE PRACTITIONER

## 2023-10-14 PROCEDURE — 84153 ASSAY OF PSA TOTAL: CPT

## 2023-10-14 PROCEDURE — 96361 HYDRATE IV INFUSION ADD-ON: CPT

## 2023-10-14 PROCEDURE — G0378 HOSPITAL OBSERVATION PER HR: HCPCS

## 2023-10-14 PROCEDURE — 6370000000 HC RX 637 (ALT 250 FOR IP): Performed by: NURSE PRACTITIONER

## 2023-10-14 PROCEDURE — 97161 PT EVAL LOW COMPLEX 20 MIN: CPT

## 2023-10-14 PROCEDURE — 96376 TX/PRO/DX INJ SAME DRUG ADON: CPT

## 2023-10-14 PROCEDURE — 80053 COMPREHEN METABOLIC PANEL: CPT

## 2023-10-14 RX ORDER — MULTIVIT-MIN/IRON/FOLIC ACID/K 18-600-40
2000 CAPSULE ORAL DAILY
COMMUNITY

## 2023-10-14 RX ORDER — TAMSULOSIN HYDROCHLORIDE 0.4 MG/1
0.4 CAPSULE ORAL DAILY
Status: DISCONTINUED | OUTPATIENT
Start: 2023-10-14 | End: 2023-10-14

## 2023-10-14 RX ORDER — LANOLIN ALCOHOL/MO/W.PET/CERES
3 CREAM (GRAM) TOPICAL NIGHTLY PRN
Status: DISCONTINUED | OUTPATIENT
Start: 2023-10-14 | End: 2023-10-14 | Stop reason: HOSPADM

## 2023-10-14 RX ORDER — CEFDINIR 300 MG/1
300 CAPSULE ORAL 2 TIMES DAILY
Qty: 10 CAPSULE | Refills: 0 | Status: SHIPPED | OUTPATIENT
Start: 2023-10-14 | End: 2023-10-19

## 2023-10-14 RX ADMIN — WATER 1000 MG: 1 INJECTION INTRAMUSCULAR; INTRAVENOUS; SUBCUTANEOUS at 16:39

## 2023-10-14 RX ADMIN — LEVOTHYROXINE SODIUM 100 MCG: 100 TABLET ORAL at 06:20

## 2023-10-14 RX ADMIN — SODIUM CHLORIDE: 9 INJECTION, SOLUTION INTRAVENOUS at 00:49

## 2023-10-14 RX ADMIN — Medication 10 ML: at 09:40

## 2023-10-14 NOTE — CONSULTS
[facility]  [address] [place] [address]. Armin Ramirez  [phone]  FAX [phone]        REASON FOR CONSULTATION:      Urinary retention, history of prostate cancer    HISTORY OF PRESENT ILLNESS:      The patient is a 80 y.o. male patient who presents with urinary retention. Patient was treated for prostate cancer by Dr. Oumar Gonzalez with seed implantation many years ago. He presented to the emergency room and had a postvoid residual of 1000 mL. Todd catheter was inserted and he was found to have hematuria. He is short of breath. He speaks very slowly and has difficulty with word finding most likely due to dementia. He lives at home with his wife and has a caregiver.           Past Medical History:   Diagnosis Date    C. difficile colitis 4/21/2023    History of prostate cancer 1970    radiation for 1 month and then seed implants     History of spinal fracture     [place] (hard of hearing)     Hypertension     Hypothyroidism 2004    Leg cramps     Prostate cancer ([address])     Pulmonary emboli ([address])          Past Surgical History:   Procedure Laterality Date    COLONOSCOPY  2014    [place]    HAND TENDON SURGERY      HERNIA REPAIR  age 15    INGUINAL HERNIA REPAIR  6/7/12    RIGHT , WITH MESH    KNEE ARTHROSCOPY      MASTOID SURGERY      OTHER SURGICAL HISTORY  approx 8 yrs ago    prostate radiation and implanted seeds    OTHER SURGICAL HISTORY Right 04/30/2017    head laceration        Medications Prior to Admission:    Medications Prior to Admission: Cholecalciferol (VITAMIN D) 50 MCG (2000 UT) CAPS capsule, Take 2,000 Units by mouth daily  Handicap Placard MISC, by Does not apply route PERMANENT  lisinopril (PRINIVIL;ZESTRIL) 5 MG tablet, TAKE 1 TABLET BY MOUTH DAILY  apixaban (ELIQUIS) 2.5 MG TABS tablet, TAKE 1 TABLET BY MOUTH TWICE DAILY  levothyroxine (SYNTHROID) 100 MCG tablet, TAKE 1 TABLET BY MOUTH EVERY DAY  [DISCONTINUED] teriparatide (FORTEO) 620 MCG/2.48ML SOPN injection, masses, no organomegaly, no peritoneal signs  Extremities:  No clubbing, cyanosis, or edema  Skin:  Warm and dry, no open lesions or rashes  Neuro:  Cranial nerves 2-12 intact, no focal deficits  Rectal: deferred  Genitalia: Todd catheter draining clear, yellow urine    LABS:    Lab Results   Component Value Date    WBC 6.7 10/14/2023    HGB 10.0 (L) 10/14/2023    HCT 31.5 (L) 10/14/2023    MCV 90.8 10/14/2023     10/14/2023       Lab Results   Component Value Date    CREATININE 1.0 10/14/2023       Lab Results   Component Value Date    PSA 4.95 (H) 11/09/2021    PSA 4.39 (H) 08/02/2021    PSA 3.78 12/21/2020       Lab Results   Component Value Date    LABURIN >100,000 CFU/ml 05/18/2023       Lab Results   Component Value Date    BC 5 Days- no growth 01/29/2016       Lab Results   Component Value Date    BLOODCULT2 5 Days- no growth 01/29/2016       ASSESSMENT / PLAN:      1. Urinary retention. Postvoid residual 1 L. Discussed with caregiver and patient prone to falls. Will hold on giving Flomax due to risk or orthostasis. Will continue catheter long-term. Follow up with our office in 1 month for Todd change. 2.  Patient lives at home alone with his wife has a history of stroke. They have a caregiver. I and the plan has been arranged.       Luther Torres MD  10:21 AM  10/14/2023

## 2023-10-14 NOTE — CARE COORDINATION
Internal Medicine On-call Care Coordination Note    I was called by the ED physician because they recommended admission for this patient and we cover their PCP. The history as I understand it after discussion with the ED physician is as follows:    Presents with hematuria, suprapubic tenderness  Hx prostate CA  Bladder scan 900cc  Todd catheter inserted  Mild MARIA ELENA    I placed admission orders. Including:    Urology consult  Held home eliquis and lisinopril  IVF     Dr. Delia Gonzalez,, or our coverage will see the patient tomorrow for H&P.     Electronically signed by RAPHAEL Bolden CNP on 10/13/2023 at 10:12 PM
Social Work:    Social service met with Mr. Raymondo Gowers and spoke with his caregiver Yessi Vazquez (main contact) via phone. Mr. Karson Narvaez is a patient of Dr. Roberto Couch and uses [facility] [employer]. He resides with his wife in a 2-story home with his bedroom & bathroom located on the main floor. He has a wheeled walker, cane, and wheelchair if needed. Upon discharge home today Mr. Raymondo Gowers would like home PT from [facility]. Social work called a referral in today. Social work provided a verbal understanding of the observation status (MOON) letter given to patient & [facility].       Electronically signed by YASMANY Taylor on 10/14/2023 at 3:07 PM
detailed exam

## 2023-10-14 NOTE — PROGRESS NOTES
Spoke with Dr. Jayy Miller regarding patient plan for discharge, ok for dc with villegas as long as we ensure patient has transport to and from office and appts as well as villegas care. Spoke with urology regarding above again, dr. Jayy Miller spoke with caregiver and feels that patient can be discharged safely with appropriate care and transportation at home. Villegas stays, flomax stopped d/t prone to falls. Dr. Fawn Alvarado updated.

## 2023-10-14 NOTE — PROGRESS NOTES
Passed on to day shift nurse that Dr. Cora Cowart placed 2 nursing communication orders that needed addressed today. He would like us to find out patient's code status and patient's home meds and respond to him with these answers.

## 2023-10-14 NOTE — PROGRESS NOTES
Home care order placed for Physical Therapy at home PER Social Work request.     Electronically signed by Nieves Schmidt RN on 10/14/2023 at 3:07 PM

## 2023-10-14 NOTE — PROGRESS NOTES
Consult completed to urology at this time for retention      Electronically signed by Gilda Barfield RN on 10/14/2023 at 8:50 AM

## 2023-10-14 NOTE — PROGRESS NOTES
4 Eyes Skin Assessment     NAME:  Julio Ley  YOB: 1928  MEDICAL RECORD NUMBER:  [id number]    The patient is being assessed for  Admission    I agree that at least one RN has performed a thorough Head to Toe Skin Assessment on the patient. ALL assessment sites listed below have been assessed. Areas assessed by both nurses:    Head, Face, Ears, Shoulders, Back, Chest, Arms, Elbows, Hands, Sacrum. Buttock, Coccyx, Ischium, and Legs. Feet and Heels        Does the Patient have a Wound?  No noted wound(s)       Tyler Prevention initiated by RN: Yes  Wound Care Orders initiated by RN: No    Pressure Injury (Stage 3,4, Unstageable, DTI, NWPT, and Complex wounds) if present, place Wound referral order by RN under : No    New Ostomies, if present place, Ostomy referral order under : No     Nurse 1 eSignature: Electronically signed by Sanjana Rosales RN on 10/14/23 at 1:41 AM EDT    **SHARE this note so that the co-signing nurse can place an eSignature**    Nurse 2 eSignature: Electronically signed by Luis Antonio Alberto RN on 10/14/23 at 1:42 AM EDT

## 2023-10-14 NOTE — H&P
Internal Medicine History & Physical     Name: Shweta Gordon  : 1928  Chief Complaint: Hematuria (Bleeding from penis since last night / on eliquis )  Primary Care Physician: Parth Small MD  Admission date: 10/13/2023  Date of service: 10/14/2023   Unit: [facility] MED SURG/TELE     History of Present Illness  Paddy Ledezma is a 80y.o. year old male. He presented to the hospital with difficulty with urination as well as some blood in his urine. He has a history of urinary retention. He felt a lot better after Todd catheter was placed. He denies any other complaints or issues and he says he feels \"ready to go home\". Nothing in particular makes his symptoms better or worse. Overall symptoms are moderate in severity. There were no family or friends present at bedside. History is provided by the patient. He was felt to be a good historian. ED course:   Initial blood work and imaging studies performed. Admission recommended by ED physician. My coverage discussed the case with the ED provider.  Meds in the ED consisted of the following: Rocephin, IVf    Past Medical History:   Diagnosis Date    C. difficile colitis 2023    History of prostate cancer 1970    radiation for 1 month and then seed implants     History of spinal fracture     [place] (hard of hearing)     Hypertension     Hypothyroidism     Leg cramps     Prostate cancer ([address])     Pulmonary emboli (HCC)        Past Surgical History:   Procedure Laterality Date    COLONOSCOPY      Torri    HAND TENDON SURGERY      HERNIA REPAIR  age 15    INGUINAL HERNIA REPAIR  12    RIGHT , WITH MESH    KNEE ARTHROSCOPY      MASTOID SURGERY      OTHER SURGICAL HISTORY  approx 8 yrs ago    prostate radiation and implanted seeds    OTHER SURGICAL HISTORY Right 2017    head laceration        Family Medical History:  Family History   Problem Relation Age of Onset    Heart Attack Daughter     Stroke Mother     Cancer Father         lung, appropriate without slurring, sensation to fine touch intact in upper and lower extremities    Labs-   Lab Results   Component Value Date    WBC 6.7 10/14/2023    HGB 10.0 (L) 10/14/2023    HCT 31.5 (L) 10/14/2023     10/14/2023     10/14/2023    K 4.1 10/14/2023     (H) 10/14/2023    CREATININE 1.0 10/14/2023    BUN 38 (H) 10/14/2023    CO2 24 10/14/2023    GLUCOSE 120 (H) 10/14/2023    ALT 7 10/14/2023    AST 16 10/14/2023    INR 1.0 04/19/2023    APTT 31.7 12/17/2021       Lab Results   Component Value Date    IRON 74 08/13/2020    FERRITIN 591 12/17/2021    TIBC 276 08/13/2020       Lab Results   Component Value Date    TROPHS 11 04/19/2023    TROPHS 13 (H) 04/19/2023    TROPHS 25 (H) 12/16/2021        Lab Results   Component Value Date    TSH 1.700 04/19/2023    VITD25 52 06/19/2023       Lab Results   Component Value Date    MG 2.3 05/05/2023    PHOS 3.5 01/13/2016       Recent Radiological Studies:  CT ABDOMEN PELVIS WO CONTRAST Additional Contrast? None   Final Result   Urinary bladder is mildly distended with air-fluid level. Todd catheter in   place however despite this with potential air from instrumentation   questionable combined air somewhat linear of potential within the bladder   wall of emphysematous cystitis versus mixed air. Increased density in the   dependent portion urinary bladder could represent components of blood   products given dependent layering density. Correlation with output if   hematuria. No overt hydronephrosis or obstructing uropathy otherwise   evident. No nephrolithiasis or urolithiasis. Large hiatal hernia projecting to the left lower chest of an intrathoracic   stomach configuration with distension and air-fluid level containing   transverse colon in the hernia sac along with mesenteric fat.              Assessment  Active Hospital Problems    Diagnosis     Hematuria [R31.9]      Priority: High    Complicated UTI (urinary tract infection) [N39.0]

## 2023-10-14 NOTE — DISCHARGE SUMMARY
The patient was discharged on the same day as history and physical.  Please see history and physical as well as imaging studies, consult and evaluations, and nurse's notes.     Electronically signed by Jax Hernandez DO on 10/14/2023 at 6:43 PM

## 2023-10-14 NOTE — PROGRESS NOTES
Physical Therapy  Facility/Department: [address] MED SURG/TELE  Physical Therapy Initial Assessment    Name: Ryan Oropeza  : 1928  MRN: [id number]  Date of Service: 10/14/2023    Patient Diagnosis(es): The primary encounter diagnosis was Urinary retention. Diagnoses of MARIA ELENA (acute kidney injury) ([address]), Acute cystitis with hematuria, and Hypotension, unspecified hypotension type were also pertinent to this visit. Past Medical History:  has a past medical history of C. difficile colitis, History of prostate cancer, History of spinal fracture, [place] (hard of hearing), Hypertension, Hypothyroidism, Leg cramps, Prostate cancer ([address]), and Pulmonary emboli ([address]). Past Surgical History:  has a past surgical history that includes other surgical history (approx 8 yrs ago); Knee arthroscopy; Hand tendon surgery; Inguinal hernia repair (12); Colonoscopy (); Mastoid surgery; other surgical history (Right, 2017); and hernia repair (age 15). Referring provider:  Karen Gilbert DO    PT Order:  PT eval and treat     Evaluating PT:  Manuel Adams PT, DPT PT [id number]    Room #:  9968/1713-D  Diagnosis:  Urinary retention [R33.9]  UTI (urinary tract infection) [N39.0]  MARIA ELENA (acute kidney injury) ([address]) [N17.9]  Acute cystitis with hematuria [G71.70]  Complicated UTI (urinary tract infection) [N39.0]  Hypotension, unspecified hypotension type [I95.9]  Precautions:  fall risk, decreased vision  Equipment Needs:  none. Pt reported owning a cane and rollator. SUBJECTIVE:    Pt lives with his wife in a 1.5 story home with 3 stairs to enter and 1 rail. Bed and bath is on first floor. Pt ambulated with cane or rollator PTA. Pt reported he typically using the furniture during ambulation. Pt and wife have a caretaker daily. OBJECTIVE:   Initial Evaluation  Date: 10/14 Treatment Short Term/ Long Term   Goals   Was pt agreeable to Eval/treatment? yes     Does pt have pain?  None reported     Bed Mobility [R33.9]  UTI (urinary tract infection) [N39.0]  MARIA ELENA (acute kidney injury) ([address]) [N17.9]  Acute cystitis with hematuria [M67.67]  Complicated UTI (urinary tract infection) [N39.0]  Hypotension, unspecified hypotension type [I95.9]    Current Treatment Recommendations:     [x] Strengthening to improve independence with functional mobility   [] ROM to improve independence with functional mobility   [x] Balance Training to improve static/dynamic balance and to reduce fall risk  [x] Endurance Training to improve activity tolerance during functional mobility   [x] Transfer Training to improve safety and independence with all functional transfers   [x] Gait Training to improve gait mechanics, endurance and assess need for appropriate assistive device  [x] Stair Training in preparation for safe discharge home and/or into the community   [] Positioning to prevent skin breakdown and contractures  [x] Safety and Education Training   [x] Patient/Caregiver Education   [] HEP  [] Other     PT long term treatment goals are located in above grid    Frequency of treatments: 2-5x/week x 1-2 weeks. Time in  1053  Time out  1114    Evaluation Time includes thorough review of current medical information, gathering information on past medical history/social history and prior level of function, completion of standardized testing/informal observation of tasks, assessment of data and education on plan of care and goals.     CPT codes:  [x] Low Complexity PT evaluation 57114  [] Moderate Complexity PT evaluation 39477  [] High Complexity PT evaluation 26963  [] PT Re-evaluation 00184  [] Therapeutic activities 17386 __minutes  [] Therapeutic exercises 98878 __ minutes      Purnima Parham, PT, DPT  PT [id number]

## 2023-10-14 NOTE — PROGRESS NOTES
Sandy Mendosa was ordered teriparatide Zeinab Banks) which is a nonformulary medication. Nurse is going to check with patient to see if home supply of this medication will be brought in to the hospital for inpatient use. A pharmacist will follow-up with the nurse of the patient to assess the capability of the patient to bring in the medication. If it is determined that the patient cannot supply the medication and it is not available to be dispensed from the pharmacy, a call will be placed to the ordering provider to discuss alternative options.      Bibi Spence, PharmD  10/13/23 10:28 PM

## 2023-10-15 LAB
MICROORGANISM SPEC CULT: ABNORMAL
SPECIMEN DESCRIPTION: ABNORMAL

## 2023-10-16 ENCOUNTER — CARE COORDINATION (OUTPATIENT)
Dept: CARE COORDINATION | Age: 88
End: 2023-10-16

## 2023-10-16 NOTE — CARE COORDINATION
Care Transitions Initial Follow Up Call    Call within 2 business days of discharge: Yes  \  Patient: Marlon Goetz Patient : 1928   MRN: [id number]  Reason for Admission: urinary retention   Discharge Date: 10/14/23 RARS: Readmission Risk Score: 15.5      Last Discharge Facility       Date Complaint Diagnosis Description Type Department Provider    10/13/23 Hematuria Urinary retention . .. ED to Hosp-Admission (Discharged) (ADMITTED) [facility] Faisal Toledo, DO; Hayley Hernandez. .. First attempt to reach the patient for initial Care Transition call post hospital discharge. Voicemail box is full, unable to leave a message.     Follow Up  Future Appointments   Date Time Provider [address]   10/24/2023  1:00 PM Rosalba Zapata MD [facility]   2024 10:00 AM Radha Plascencia APRN - CNP [facility]   2024 10:40 AM Desiree Helm DO [facility]     Heidy Haddad RN

## 2023-10-17 ENCOUNTER — CARE COORDINATION (OUTPATIENT)
Dept: CARE COORDINATION | Age: 88
End: 2023-10-17

## 2023-10-17 NOTE — CARE COORDINATION
Care Transitions Initial Follow Up Call    Call within 2 business days of discharge: Yes    Patient: Christopher Chanel Patient : 1928   MRN: [id number]  Reason for Admission: urinary retention, hematuria   Discharge Date: 10/14/23 RARS: Readmission Risk Score: 15.5      Last Discharge Facility       Date Complaint Diagnosis Description Type Department Provider    10/13/23 Hematuria Urinary retention . .. ED to Hosp-Admission (Discharged) (ADMITTED) [facility] Faisal Toledo, DO; Hayley Hernandez. .. Second and final attempt to reach the patient for initial Care Transition call post hospital discharge. Voicemail box remains full, unable to leave a message. Will sign off.      Follow Up  Future Appointments   Date Time Provider [address] [place] [address]   10/24/2023  1:00 PM MD BAYLEE Dunne [facility]   2024 10:00 AM RAPHAEL De La Cruz - CNP [facility]   2024 10:40 AM Gemma Helm DO [facility]     Dotty Stacy RN

## 2023-12-11 ENCOUNTER — TELEPHONE (OUTPATIENT)
Dept: FAMILY MEDICINE CLINIC | Age: 88
End: 2023-12-11

## 2023-12-11 NOTE — TELEPHONE ENCOUNTER
Dr Ayanna Simons routing comment back to me:        He can be off the Eliquis for 2 days prior to the procedure and then on the medicine the next day after the procedure.

## 2023-12-11 NOTE — TELEPHONE ENCOUNTER
Pts dentist, Dr Rasta Calderon called in and said Jihan De La Rosa has a few teeth that are needing to be extracted. He said due to Jihan De La Rosa being on Eliquis, he wanted your input and see how long he can be off of it. He is not yet scheduled for the procedure. Please Advise. Telephone number for  [facility] office is [phone].

## 2023-12-11 NOTE — TELEPHONE ENCOUNTER
I advised Dr Una Lu office office of Dr [facility] comment:         He can be off the Eliquis for 2 days prior to the procedure and then on the medicine the next day after the procedure. Their office will notify the patient.

## 2023-12-19 DIAGNOSIS — Z79.01 CHRONIC ANTICOAGULATION: ICD-10-CM

## 2023-12-19 DIAGNOSIS — I10 ESSENTIAL HYPERTENSION: ICD-10-CM

## 2023-12-19 DIAGNOSIS — E03.9 ACQUIRED HYPOTHYROIDISM: ICD-10-CM

## 2023-12-19 DIAGNOSIS — I35.0 NONRHEUMATIC AORTIC VALVE STENOSIS: ICD-10-CM

## 2023-12-19 DIAGNOSIS — C61 PROSTATE CANCER (HCC): ICD-10-CM

## 2023-12-19 LAB
ABSOLUTE IMMATURE GRANULOCYTE: <0.03 K/UL (ref 0–0.58)
ALBUMIN SERPL-MCNC: 3.9 G/DL (ref 3.5–5.2)
ALP BLD-CCNC: 101 U/L (ref 40–129)
ALT SERPL-CCNC: 10 U/L (ref 0–40)
ANION GAP SERPL CALCULATED.3IONS-SCNC: 16 MMOL/L (ref 7–16)
AST SERPL-CCNC: 23 U/L (ref 0–39)
BASOPHILS ABSOLUTE: 0.02 K/UL (ref 0–0.2)
BASOPHILS RELATIVE PERCENT: 0 % (ref 0–2)
BILIRUB SERPL-MCNC: 0.3 MG/DL (ref 0–1.2)
CALCIUM SERPL-MCNC: 9.8 MG/DL (ref 8.6–10.2)
CHLORIDE BLD-SCNC: 102 MMOL/L (ref 98–107)
CO2: 22 MMOL/L (ref 22–29)
CREAT SERPL-MCNC: 0.8 MG/DL (ref 0.7–1.2)
EOSINOPHILS ABSOLUTE: 0.09 K/UL (ref 0.05–0.5)
EOSINOPHILS RELATIVE PERCENT: 2 % (ref 0–6)
FOLATE: >20 NG/ML (ref 4.8–24.2)
GFR SERPL CREATININE-BSD FRML MDRD: >60 ML/MIN/1.73M2
GLUCOSE BLD-MCNC: 87 MG/DL (ref 74–99)
HCT VFR BLD CALC: 41 % (ref 37–54)
HEMOGLOBIN: 12.7 G/DL (ref 12.5–16.5)
IMMATURE GRANULOCYTES: 0 % (ref 0–5)
IRON % SATURATION: 30 % (ref 20–55)
IRON: 103 UG/DL (ref 59–158)
LYMPHOCYTES ABSOLUTE: 1.84 K/UL (ref 1.5–4)
LYMPHOCYTES RELATIVE PERCENT: 34 % (ref 20–42)
MCH RBC QN AUTO: 28.3 PG (ref 26–35)
MCHC RBC AUTO-ENTMCNC: 31 G/DL (ref 32–34.5)
MCV RBC AUTO: 91.5 FL (ref 80–99.9)
MONOCYTES ABSOLUTE: 0.51 K/UL (ref 0.1–0.95)
MONOCYTES RELATIVE PERCENT: 9 % (ref 2–12)
NEUTROPHILS ABSOLUTE: 2.98 K/UL (ref 1.8–7.3)
NEUTROPHILS RELATIVE PERCENT: 55 % (ref 43–80)
PDW BLD-RTO: 15.4 % (ref 11.5–15)
PLATELET # BLD: 267 K/UL (ref 130–450)
PMV BLD AUTO: 9.8 FL (ref 7–12)
POTASSIUM SERPL-SCNC: 4.7 MMOL/L (ref 3.5–5)
RBC # BLD: 4.48 M/UL (ref 3.8–5.8)
SODIUM BLD-SCNC: 140 MMOL/L (ref 132–146)
TOTAL IRON BINDING CAPACITY: 342 UG/DL (ref 250–450)
TOTAL PROTEIN: 6.8 G/DL (ref 6.4–8.3)
TSH SERPL DL<=0.05 MIU/L-ACNC: 1.54 UIU/ML (ref 0.27–4.2)
VITAMIN B-12: 787 PG/ML (ref 211–946)
WBC # BLD: 5.5 K/UL (ref 4.5–11.5)

## 2024-01-23 ENCOUNTER — OFFICE VISIT (OUTPATIENT)
Dept: FAMILY MEDICINE CLINIC | Age: 89
End: 2024-01-23
Payer: MEDICARE

## 2024-01-23 VITALS
WEIGHT: 124 LBS | SYSTOLIC BLOOD PRESSURE: 100 MMHG | DIASTOLIC BLOOD PRESSURE: 72 MMHG | BODY MASS INDEX: 19.42 KG/M2 | HEART RATE: 85 BPM | OXYGEN SATURATION: 97 % | TEMPERATURE: 97 F

## 2024-01-23 DIAGNOSIS — R31.9 URINARY TRACT INFECTION WITH HEMATURIA, SITE UNSPECIFIED: ICD-10-CM

## 2024-01-23 DIAGNOSIS — R07.81 RIB PAIN ON RIGHT SIDE: Primary | ICD-10-CM

## 2024-01-23 DIAGNOSIS — N39.0 URINARY TRACT INFECTION WITH HEMATURIA, SITE UNSPECIFIED: ICD-10-CM

## 2024-01-23 LAB
BILIRUBIN, POC: NORMAL
BLOOD URINE, POC: NORMAL
CLARITY, POC: NORMAL
COLOR, POC: NORMAL
GLUCOSE URINE, POC: NORMAL
KETONES, POC: NORMAL
LEUKOCYTE EST, POC: NORMAL
NITRITE, POC: NORMAL
PH, POC: 5.5
PROTEIN, POC: NORMAL
SPECIFIC GRAVITY, POC: 1.02
UROBILINOGEN, POC: 0.2

## 2024-01-23 PROCEDURE — 1123F ACP DISCUSS/DSCN MKR DOCD: CPT | Performed by: PHYSICIAN ASSISTANT

## 2024-01-23 PROCEDURE — 99214 OFFICE O/P EST MOD 30 MIN: CPT | Performed by: PHYSICIAN ASSISTANT

## 2024-01-23 PROCEDURE — 81002 URINALYSIS NONAUTO W/O SCOPE: CPT | Performed by: PHYSICIAN ASSISTANT

## 2024-01-23 RX ORDER — CIPROFLOXACIN 250 MG/1
250 TABLET, FILM COATED ORAL 2 TIMES DAILY
Qty: 14 TABLET | Refills: 0 | Status: SHIPPED | OUTPATIENT
Start: 2024-01-23 | End: 2024-01-30

## 2024-01-23 NOTE — PROGRESS NOTES
24  Bora Hunt : 1928 Sex: male  Age 89 y.o.      Subjective:  Chief Complaint   Patient presents with    Abdominal Pain     Right upper abdominal pain, started 2 days ago, concern for blood clot         HPI:   HPI  Bora Hunt , 89 y.o. male presents to express care for evaluation of right-sided rib pain after sneezing.  The patient started with right-sided rib pain 2 days ago.  The patient states that since he sneezed he felt a strain in the right side of his ribs.  The patient states that there is 1 specific area where he can locate the pain.  The patient is not having any fevers, chills.  The patient is not having a cough.  The patient is not really having any shortness of breath.  He does have a history of pulmonary embolism on the left.  The patient was placed on Eliquis and is still taking the Eliquis.  He is not having any hemoptysis.  He is not having any urinary complaints and he does have a history of UTIs.  The patient is not having any stomach pains, nausea, vomiting.      ROS:   Unless otherwise stated in this report the patient's positive and negative responses for review of systems for constitutional, eyes, ENT, cardiovascular, respiratory, gastrointestinal, neurological, , musculoskeletal, and integument systems and related systems to the presenting problem are either stated in the history of present illness or were not pertinent or were negative for the symptoms and/or complaints related to the presenting medical problem.  Positives and pertinent negatives as per HPI.  All others reviewed and are negative.      PMH:     Past Medical History:   Diagnosis Date    C. difficile colitis 2023    History of prostate cancer 1970    radiation for 1 month and then seed implants     History of spinal fracture     [place] (hard of hearing)     Hypertension     Hypothyroidism 2004    Leg cramps     Prostate cancer (HCC)     Pulmonary emboli (HCC)        Past Surgical History:

## 2024-02-19 PROCEDURE — 88305 TISSUE EXAM BY PATHOLOGIST: CPT | Performed by: DERMATOLOGY

## 2024-02-19 PROCEDURE — 88305 TISSUE EXAM BY PATHOLOGIST: CPT

## 2024-02-20 RX ORDER — LISINOPRIL 5 MG/1
TABLET ORAL
Qty: 90 TABLET | Refills: 1 | Status: SHIPPED | OUTPATIENT
Start: 2024-02-20

## 2024-02-20 NOTE — TELEPHONE ENCOUNTER
Last Appointment:  12/19/2023  Future Appointments   Date Time Provider Department Center   3/12/2024 10:00 AM Manish Reynoso APRN - CNP [facility]   4/10/2024  1:30 PM Kane Lei MD [facility]   4/10/2024  1:45 PM Kane Lei MD [facility]

## 2024-02-21 ENCOUNTER — LAB REQUISITION (OUTPATIENT)
Dept: DERMATOPATHOLOGY | Facility: CLINIC | Age: 89
End: 2024-02-21
Payer: MEDICARE

## 2024-02-21 DIAGNOSIS — D48.5 NEOPLASM OF UNCERTAIN BEHAVIOR OF SKIN: ICD-10-CM

## 2024-02-22 LAB
LABORATORY COMMENT REPORT: NORMAL
PATH REPORT.FINAL DX SPEC: NORMAL
PATH REPORT.GROSS SPEC: NORMAL
PATH REPORT.MICROSCOPIC SPEC OTHER STN: NORMAL
PATH REPORT.RELEVANT HX SPEC: NORMAL
PATH REPORT.TOTAL CANCER: NORMAL

## 2024-03-12 ENCOUNTER — OFFICE VISIT (OUTPATIENT)
Dept: ENT CLINIC | Age: 89
End: 2024-03-12
Payer: MEDICARE

## 2024-03-12 VITALS
TEMPERATURE: 97.5 F | HEART RATE: 77 BPM | DIASTOLIC BLOOD PRESSURE: 62 MMHG | HEIGHT: 67 IN | SYSTOLIC BLOOD PRESSURE: 94 MMHG | WEIGHT: 103 LBS | BODY MASS INDEX: 16.17 KG/M2 | OXYGEN SATURATION: 96 %

## 2024-03-12 DIAGNOSIS — H61.22 IMPACTED CERUMEN, LEFT EAR: Primary | ICD-10-CM

## 2024-03-12 PROCEDURE — 1123F ACP DISCUSS/DSCN MKR DOCD: CPT

## 2024-03-12 PROCEDURE — 69210 REMOVE IMPACTED EAR WAX UNI: CPT

## 2024-03-12 PROCEDURE — 99212 OFFICE O/P EST SF 10 MIN: CPT

## 2024-03-12 ASSESSMENT — ENCOUNTER SYMPTOMS
ALLERGIC/IMMUNOLOGIC NEGATIVE: 1
DIARRHEA: 0
SHORTNESS OF BREATH: 0
RHINORRHEA: 0
VOMITING: 0
SINUS PRESSURE: 0
EYE DISCHARGE: 0
EYE PAIN: 0
BACK PAIN: 0
SORE THROAT: 0
COUGH: 0

## 2024-03-12 NOTE — PROGRESS NOTES
Subjective:      Patient ID:  Bora Hunt is a 89 y.o. male.    HPI:    Pt presents with a history of cerumen impaction removal.   The patients ear was last cleaned 9 month(s) ago.   The patient was not using ear drops to loosen wax immediately prior to this visit.    Hearing aids: yes, is not wearing , does not feel they help       Past Medical History:   Diagnosis Date    C. difficile colitis 4/21/2023    History of prostate cancer 1970    radiation for 1 month and then seed implants     History of spinal fracture     [place] (hard of hearing)     Hypertension     Hypothyroidism 2004    Leg cramps     Prostate cancer (HCC)     Pulmonary emboli (HCC)      Past Surgical History:   Procedure Laterality Date    COLONOSCOPY  2014    [place]    DENTAL SURGERY  2024    dr luna    HAND TENDON SURGERY      HERNIA REPAIR  age 12    INGUINAL HERNIA REPAIR  06/07/2012    RIGHT , WITH MESH    KNEE ARTHROSCOPY      MASTOID SURGERY      OTHER SURGICAL HISTORY  approx 8 yrs ago    prostate radiation and implanted seeds    OTHER SURGICAL HISTORY Right 04/30/2017    head laceration     SKIN CANCER EXCISION      Dr Gaspar     Family History   Problem Relation Age of Onset    Heart Attack Daughter     Stroke Mother     Cancer Father         lung, smoking     Cancer Brother         throat, smoking      Social History     Socioeconomic History    Marital status:      Spouse name: None    Number of children: None    Years of education: None    Highest education level: None   Occupational History    Occupation: business owner   Tobacco Use    Smoking status: Never    Smokeless tobacco: Never   Vaping Use    Vaping Use: Never used   Substance and Sexual Activity    Alcohol use: Yes     Comment: socially    Drug use: No    Sexual activity: Not Currently     Partners: Female   Social History Narrative    ** Merged History Encounter **    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  He has been on the same location

## 2024-03-20 ENCOUNTER — TELEPHONE (OUTPATIENT)
Dept: FAMILY MEDICINE CLINIC | Age: 89
End: 2024-03-20

## 2024-03-20 DIAGNOSIS — Z87.440 HISTORY OF UTI: Primary | ICD-10-CM

## 2024-03-20 DIAGNOSIS — R82.90 FOUL SMELLING URINE: ICD-10-CM

## 2024-03-20 DIAGNOSIS — R82.998 DARK URINE: ICD-10-CM

## 2024-03-20 DIAGNOSIS — N30.01 ACUTE CYSTITIS WITH HEMATURIA: Primary | ICD-10-CM

## 2024-03-20 RX ORDER — CIPROFLOXACIN 250 MG/1
250 TABLET, FILM COATED ORAL 2 TIMES DAILY
Qty: 14 TABLET | Refills: 0 | Status: ON HOLD | OUTPATIENT
Start: 2024-03-20 | End: 2024-03-27

## 2024-03-20 NOTE — TELEPHONE ENCOUNTER
Orders faxed to [employer] [facility] on [address] in [facility] [phone]

## 2024-03-20 NOTE — TELEPHONE ENCOUNTER
Megha called to request an antibiotic be called in for Bora to treat a UTI. States that his urine is dark, has a strong odor, and it is hard for him to get out of bed. Denies confusion but does have pain on right side that is common w/ a UTI for him. States that Bora is prone to kidney infections.     Megha is able to  a urine container from [facility] in [place] if you would like a urine submitted before antibiotic is started. Order is ready for you to sign.     Okay to speak to Bel or Megha at the home number

## 2024-03-21 DIAGNOSIS — I10 ESSENTIAL HYPERTENSION: Chronic | ICD-10-CM

## 2024-03-21 DIAGNOSIS — E03.9 ACQUIRED HYPOTHYROIDISM: ICD-10-CM

## 2024-03-21 DIAGNOSIS — Z79.01 CHRONIC ANTICOAGULATION: ICD-10-CM

## 2024-03-22 ENCOUNTER — HOSPITAL ENCOUNTER (INPATIENT)
Age: 89
LOS: 4 days | Discharge: HOME HEALTH | DRG: 872 | End: 2024-03-26
Attending: EMERGENCY MEDICINE | Admitting: HOSPITALIST
Payer: MEDICARE

## 2024-03-22 ENCOUNTER — APPOINTMENT (OUTPATIENT)
Dept: CT IMAGING | Age: 89
DRG: 872 | End: 2024-03-22
Payer: MEDICARE

## 2024-03-22 DIAGNOSIS — R53.1 GENERAL WEAKNESS: ICD-10-CM

## 2024-03-22 DIAGNOSIS — K59.00 CONSTIPATION, UNSPECIFIED CONSTIPATION TYPE: ICD-10-CM

## 2024-03-22 DIAGNOSIS — N39.0 URINARY TRACT INFECTION WITHOUT HEMATURIA, SITE UNSPECIFIED: ICD-10-CM

## 2024-03-22 DIAGNOSIS — K57.90 DIVERTICULOSIS: ICD-10-CM

## 2024-03-22 DIAGNOSIS — N12 PYELONEPHRITIS: Primary | ICD-10-CM

## 2024-03-22 DIAGNOSIS — K56.41 FECAL IMPACTION IN RECTUM (HCC): ICD-10-CM

## 2024-03-22 DIAGNOSIS — K44.9 HIATAL HERNIA: ICD-10-CM

## 2024-03-22 PROBLEM — N10 ACUTE PYELONEPHRITIS: Status: ACTIVE | Noted: 2024-03-22

## 2024-03-22 LAB
ALBUMIN SERPL-MCNC: 4 G/DL (ref 3.5–5.2)
ALP SERPL-CCNC: 116 U/L (ref 40–129)
ALT SERPL-CCNC: 13 U/L (ref 0–40)
ANION GAP SERPL CALCULATED.3IONS-SCNC: 10 MMOL/L (ref 7–16)
AST SERPL-CCNC: 21 U/L (ref 0–39)
BASOPHILS # BLD: 0.02 K/UL (ref 0–0.2)
BASOPHILS NFR BLD: 0 % (ref 0–2)
BILIRUB DIRECT SERPL-MCNC: <0.2 MG/DL (ref 0–0.3)
BILIRUB INDIRECT SERPL-MCNC: NORMAL MG/DL (ref 0–1)
BILIRUB SERPL-MCNC: 0.6 MG/DL (ref 0–1.2)
BILIRUB UR QL STRIP: NEGATIVE
BILIRUBIN, URINE: NEGATIVE
BLOOD, URINE: NEGATIVE
BUN SERPL-MCNC: 25 MG/DL (ref 6–23)
CALCIUM SERPL-MCNC: 9.8 MG/DL (ref 8.6–10.2)
CHLORIDE SERPL-SCNC: 102 MMOL/L (ref 98–107)
CLARITY UR: CLEAR
CLARITY: ABNORMAL
CO2 SERPL-SCNC: 27 MMOL/L (ref 22–29)
COLOR UR: YELLOW
COLOR: YELLOW
CREAT SERPL-MCNC: 0.9 MG/DL (ref 0.7–1.2)
EOSINOPHIL # BLD: 0.17 K/UL (ref 0.05–0.5)
EOSINOPHILS RELATIVE PERCENT: 3 % (ref 0–6)
ERYTHROCYTE [DISTWIDTH] IN BLOOD BY AUTOMATED COUNT: 15.3 % (ref 11.5–15)
GFR SERPL CREATININE-BSD FRML MDRD: >60 ML/MIN/1.73M2
GLUCOSE SERPL-MCNC: 98 MG/DL (ref 74–99)
GLUCOSE UR STRIP-MCNC: NEGATIVE MG/DL
GLUCOSE URINE: ABNORMAL
HCT VFR BLD AUTO: 42.8 % (ref 37–54)
HGB BLD-MCNC: 13.4 G/DL (ref 12.5–16.5)
HGB UR QL STRIP.AUTO: NEGATIVE
IMM GRANULOCYTES # BLD AUTO: <0.03 K/UL (ref 0–0.58)
IMM GRANULOCYTES NFR BLD: 0 % (ref 0–5)
KETONES UR STRIP-MCNC: NEGATIVE MG/DL
KETONES, URINE: NEGATIVE
LACTATE BLDV-SCNC: 1.7 MMOL/L (ref 0.5–1.9)
LEUKOCYTE ESTERASE UR QL STRIP: ABNORMAL
LEUKOCYTE ESTERASE, URINE: ABNORMAL
LIPASE SERPL-CCNC: 19 U/L (ref 13–60)
LYMPHOCYTES NFR BLD: 0.9 K/UL (ref 1.5–4)
LYMPHOCYTES RELATIVE PERCENT: 16 % (ref 20–42)
MCH RBC QN AUTO: 28.1 PG (ref 26–35)
MCHC RBC AUTO-ENTMCNC: 31.3 G/DL (ref 32–34.5)
MCV RBC AUTO: 89.7 FL (ref 80–99.9)
MONOCYTES NFR BLD: 0.58 K/UL (ref 0.1–0.95)
MONOCYTES NFR BLD: 10 % (ref 2–12)
NEUTROPHILS NFR BLD: 70 % (ref 43–80)
NEUTS SEG NFR BLD: 3.92 K/UL (ref 1.8–7.3)
NITRITE UR QL STRIP: NEGATIVE
NITRITE, URINE: NEGATIVE
PH UA: 6 (ref 4.5–8)
PH UR STRIP: 5.5 [PH] (ref 5–9)
PLATELET # BLD AUTO: 241 K/UL (ref 130–450)
PMV BLD AUTO: 9.1 FL (ref 7–12)
POTASSIUM SERPL-SCNC: 4.1 MMOL/L (ref 3.5–5)
PROT SERPL-MCNC: 7.3 G/DL (ref 6.4–8.3)
PROT UR STRIP-MCNC: NEGATIVE MG/DL
PROTEIN UA: ABNORMAL
RBC # BLD AUTO: 4.77 M/UL (ref 3.8–5.8)
RBC #/AREA URNS HPF: ABNORMAL /HPF
SODIUM SERPL-SCNC: 139 MMOL/L (ref 132–146)
SP GR UR STRIP: 1.02 (ref 1–1.03)
SPECIFIC GRAVITY, URINE: 1.02
TROPONIN I SERPL HS-MCNC: 15 NG/L (ref 0–11)
UROBILINOGEN UR STRIP-ACNC: 0.2 EU/DL (ref 0–1)
UROBILINOGEN, URINE: NORMAL
WBC #/AREA URNS HPF: ABNORMAL /HPF
WBC OTHER # BLD: 5.6 K/UL (ref 4.5–11.5)

## 2024-03-22 PROCEDURE — 85025 COMPLETE CBC W/AUTO DIFF WBC: CPT

## 2024-03-22 PROCEDURE — 82248 BILIRUBIN DIRECT: CPT

## 2024-03-22 PROCEDURE — 84484 ASSAY OF TROPONIN QUANT: CPT

## 2024-03-22 PROCEDURE — 81001 URINALYSIS AUTO W/SCOPE: CPT

## 2024-03-22 PROCEDURE — 6360000002 HC RX W HCPCS

## 2024-03-22 PROCEDURE — 2580000003 HC RX 258

## 2024-03-22 PROCEDURE — 83605 ASSAY OF LACTIC ACID: CPT

## 2024-03-22 PROCEDURE — 87040 BLOOD CULTURE FOR BACTERIA: CPT

## 2024-03-22 PROCEDURE — 83690 ASSAY OF LIPASE: CPT

## 2024-03-22 PROCEDURE — 74176 CT ABD & PELVIS W/O CONTRAST: CPT

## 2024-03-22 PROCEDURE — 99285 EMERGENCY DEPT VISIT HI MDM: CPT

## 2024-03-22 PROCEDURE — 2580000003 HC RX 258: Performed by: HOSPITALIST

## 2024-03-22 PROCEDURE — 6370000000 HC RX 637 (ALT 250 FOR IP): Performed by: HOSPITALIST

## 2024-03-22 PROCEDURE — 1200000000 HC SEMI PRIVATE

## 2024-03-22 PROCEDURE — 87086 URINE CULTURE/COLONY COUNT: CPT

## 2024-03-22 PROCEDURE — 87154 CUL TYP ID BLD PTHGN 6+ TRGT: CPT

## 2024-03-22 PROCEDURE — 80053 COMPREHEN METABOLIC PANEL: CPT

## 2024-03-22 RX ORDER — 0.9 % SODIUM CHLORIDE 0.9 %
1000 INTRAVENOUS SOLUTION INTRAVENOUS ONCE
Status: COMPLETED | OUTPATIENT
Start: 2024-03-22 | End: 2024-03-22

## 2024-03-22 RX ORDER — ASCORBIC ACID 500 MG
500 TABLET ORAL EVERY MORNING
COMMUNITY

## 2024-03-22 RX ORDER — LISINOPRIL 5 MG/1
5 TABLET ORAL EVERY MORNING
Status: ON HOLD | COMMUNITY
End: 2024-03-25 | Stop reason: HOSPADM

## 2024-03-22 RX ORDER — SODIUM CHLORIDE 0.9 % (FLUSH) 0.9 %
5-40 SYRINGE (ML) INJECTION EVERY 12 HOURS SCHEDULED
Status: DISCONTINUED | OUTPATIENT
Start: 2024-03-22 | End: 2024-03-26 | Stop reason: HOSPADM

## 2024-03-22 RX ORDER — SODIUM CHLORIDE 0.9 % (FLUSH) 0.9 %
5-40 SYRINGE (ML) INJECTION PRN
Status: DISCONTINUED | OUTPATIENT
Start: 2024-03-22 | End: 2024-03-26 | Stop reason: HOSPADM

## 2024-03-22 RX ORDER — LEVOTHYROXINE SODIUM 0.1 MG/1
100 TABLET ORAL EVERY MORNING
COMMUNITY

## 2024-03-22 RX ORDER — ACETAMINOPHEN 325 MG/1
650 TABLET ORAL EVERY 6 HOURS PRN
Status: DISCONTINUED | OUTPATIENT
Start: 2024-03-22 | End: 2024-03-26 | Stop reason: HOSPADM

## 2024-03-22 RX ORDER — POLYETHYLENE GLYCOL 3350 17 G/17G
17 POWDER, FOR SOLUTION ORAL DAILY PRN
Status: DISCONTINUED | OUTPATIENT
Start: 2024-03-22 | End: 2024-03-26 | Stop reason: HOSPADM

## 2024-03-22 RX ORDER — ACETAMINOPHEN 650 MG/1
650 SUPPOSITORY RECTAL EVERY 6 HOURS PRN
Status: DISCONTINUED | OUTPATIENT
Start: 2024-03-22 | End: 2024-03-26 | Stop reason: HOSPADM

## 2024-03-22 RX ORDER — SODIUM CHLORIDE 9 MG/ML
INJECTION, SOLUTION INTRAVENOUS PRN
Status: DISCONTINUED | OUTPATIENT
Start: 2024-03-22 | End: 2024-03-26 | Stop reason: HOSPADM

## 2024-03-22 RX ORDER — SODIUM CHLORIDE 9 MG/ML
INJECTION, SOLUTION INTRAVENOUS CONTINUOUS
Status: ACTIVE | OUTPATIENT
Start: 2024-03-22 | End: 2024-03-24

## 2024-03-22 RX ORDER — LEVOTHYROXINE SODIUM 0.1 MG/1
100 TABLET ORAL EVERY MORNING
Status: DISCONTINUED | OUTPATIENT
Start: 2024-03-23 | End: 2024-03-26 | Stop reason: HOSPADM

## 2024-03-22 RX ORDER — ANTIOX #8/OM3/DHA/EPA/LUT/ZEAX 250-2.5 MG
1 CAPSULE ORAL EVERY MORNING
COMMUNITY

## 2024-03-22 RX ADMIN — SODIUM CHLORIDE: 9 INJECTION, SOLUTION INTRAVENOUS at 21:48

## 2024-03-22 RX ADMIN — CEFTRIAXONE 1000 MG: 1 INJECTION, POWDER, FOR SOLUTION INTRAMUSCULAR; INTRAVENOUS at 20:38

## 2024-03-22 RX ADMIN — ACETAMINOPHEN 650 MG: 325 TABLET ORAL at 23:39

## 2024-03-22 RX ADMIN — SODIUM CHLORIDE, PRESERVATIVE FREE 10 ML: 5 INJECTION INTRAVENOUS at 21:45

## 2024-03-22 RX ADMIN — APIXABAN 2.5 MG: 2.5 TABLET, FILM COATED ORAL at 23:39

## 2024-03-22 RX ADMIN — SODIUM CHLORIDE 1000 ML: 9 INJECTION, SOLUTION INTRAVENOUS at 13:51

## 2024-03-22 ASSESSMENT — LIFESTYLE VARIABLES
HOW OFTEN DO YOU HAVE A DRINK CONTAINING ALCOHOL: 4 OR MORE TIMES A WEEK
HOW MANY STANDARD DRINKS CONTAINING ALCOHOL DO YOU HAVE ON A TYPICAL DAY: 1 OR 2

## 2024-03-22 ASSESSMENT — PAIN DESCRIPTION - LOCATION
LOCATION: BACK
LOCATION: BACK

## 2024-03-22 ASSESSMENT — PAIN SCALES - GENERAL
PAINLEVEL_OUTOF10: 3
PAINLEVEL_OUTOF10: 10

## 2024-03-22 ASSESSMENT — PAIN DESCRIPTION - ORIENTATION: ORIENTATION: MID;LOWER

## 2024-03-22 ASSESSMENT — PAIN DESCRIPTION - DESCRIPTORS: DESCRIPTORS: ACHING;DISCOMFORT

## 2024-03-22 NOTE — PROGRESS NOTES
Database initiated. Patient is Alert with some forgetfulness. He comes in from home with wife and caregiver. States he uses a rollator and is RA at baseline.he has fallen recently. He is hard of hearing.

## 2024-03-22 NOTE — ED PROVIDER NOTES
Department of Emergency Medicine     Written by: Chary Christianson MD  Patient Name: Bora Hunt  Admit Date: 3/22/2024 12:39 PM  MRN: [id number]                   : 1928    HPI  Chief Complaint   Patient presents with    Back Pain     Pt having lower back pain for past two days having cloudy urine        Bora Hunt is a 89 y.o. male that presents to the ED with right flank pain for the past 2 days.  Presented UTIs with similar symptoms when he becomes septic.  The patient reportedly has cloudy urine.  He normally is able to ambulate, however due to the UTI and weakness he is unable to ambulate.    Review of systems:  Pertinent positives and negatives mentioned in the HPI/MDM.    Physical Exam  Constitutional:       General: He is not in acute distress.     Appearance: Normal appearance.      Comments: Weak and frail   Eyes:      Extraocular Movements: Extraocular movements intact.      Pupils: Pupils are equal, round, and reactive to light.   Cardiovascular:      Rate and Rhythm: Normal rate and regular rhythm.   Pulmonary:      Effort: Pulmonary effort is normal. No respiratory distress.   Abdominal:      Palpations: Abdomen is soft.      Tenderness: There is no abdominal tenderness.   Musculoskeletal:         General: No swelling. Normal range of motion.   Skin:     General: Skin is warm and dry.      Coloration: Skin is not jaundiced.   Neurological:      Mental Status: He is alert and oriented to person, place, and time. Mental status is at baseline.          Chart review: The patient was followed by ENT for impacted cerumen of left ear on 2024, he was also admitted on 10/13/923 for an MARIA ELENA with urinary retention.  Appears the patient was also admitted on  and  for pulmonary embolism    Social determinants: The patient has a caregiver at bedside who takes care of him    Acute or chronic illness limiting or affecting care: Acute kidney injury    ------------------------- PAST

## 2024-03-22 NOTE — CARE COORDINATION
Patient being admitted with symptoms of acute pyelonephritis.  Patient does not have elevated white blood cell count, imaging consistent with pyelonephritis, however, having fevers and significant symptoms with inability to ambulate today.  Appetite is down.  Inpatient admission for acute pyelonephritis  Evaluate in the morning  Start IV Rocephin and IV fluids  Consider ID consult if no improvement.  Urine cultures were sent by ER.  Electronically signed by Raymon Carter MD on 3/22/2024 at 5:56 PM

## 2024-03-23 LAB
ANION GAP SERPL CALCULATED.3IONS-SCNC: 7 MMOL/L (ref 7–16)
BASOPHILS # BLD: 0.01 K/UL (ref 0–0.2)
BASOPHILS NFR BLD: 0 % (ref 0–2)
BUN SERPL-MCNC: 19 MG/DL (ref 6–23)
CALCIUM SERPL-MCNC: 9 MG/DL (ref 8.6–10.2)
CHLORIDE SERPL-SCNC: 106 MMOL/L (ref 98–107)
CO2 SERPL-SCNC: 25 MMOL/L (ref 22–29)
CREAT SERPL-MCNC: 0.7 MG/DL (ref 0.7–1.2)
EOSINOPHIL # BLD: 0.09 K/UL (ref 0.05–0.5)
EOSINOPHILS RELATIVE PERCENT: 1 % (ref 0–6)
ERYTHROCYTE [DISTWIDTH] IN BLOOD BY AUTOMATED COUNT: 15.3 % (ref 11.5–15)
GFR SERPL CREATININE-BSD FRML MDRD: >60 ML/MIN/1.73M2
GLUCOSE SERPL-MCNC: 96 MG/DL (ref 74–99)
HCT VFR BLD AUTO: 38.7 % (ref 37–54)
HGB BLD-MCNC: 12 G/DL (ref 12.5–16.5)
IMM GRANULOCYTES # BLD AUTO: 0.03 K/UL (ref 0–0.58)
IMM GRANULOCYTES NFR BLD: 0 % (ref 0–5)
LACTATE BLDV-SCNC: 1 MMOL/L (ref 0.5–2.2)
LYMPHOCYTES NFR BLD: 1.54 K/UL (ref 1.5–4)
LYMPHOCYTES RELATIVE PERCENT: 20 % (ref 20–42)
MCH RBC QN AUTO: 28.2 PG (ref 26–35)
MCHC RBC AUTO-ENTMCNC: 31 G/DL (ref 32–34.5)
MCV RBC AUTO: 90.8 FL (ref 80–99.9)
MONOCYTES NFR BLD: 0.73 K/UL (ref 0.1–0.95)
MONOCYTES NFR BLD: 10 % (ref 2–12)
NEUTROPHILS NFR BLD: 68 % (ref 43–80)
NEUTS SEG NFR BLD: 5.19 K/UL (ref 1.8–7.3)
PLATELET # BLD AUTO: 203 K/UL (ref 130–450)
PMV BLD AUTO: 9.5 FL (ref 7–12)
POTASSIUM SERPL-SCNC: 4.3 MMOL/L (ref 3.5–5)
RBC # BLD AUTO: 4.26 M/UL (ref 3.8–5.8)
SODIUM SERPL-SCNC: 138 MMOL/L (ref 132–146)
WBC OTHER # BLD: 7.6 K/UL (ref 4.5–11.5)

## 2024-03-23 PROCEDURE — 6370000000 HC RX 637 (ALT 250 FOR IP): Performed by: NURSE PRACTITIONER

## 2024-03-23 PROCEDURE — 2580000003 HC RX 258: Performed by: HOSPITALIST

## 2024-03-23 PROCEDURE — 85025 COMPLETE CBC W/AUTO DIFF WBC: CPT

## 2024-03-23 PROCEDURE — 80048 BASIC METABOLIC PNL TOTAL CA: CPT

## 2024-03-23 PROCEDURE — 1200000000 HC SEMI PRIVATE

## 2024-03-23 PROCEDURE — 6360000002 HC RX W HCPCS: Performed by: HOSPITALIST

## 2024-03-23 PROCEDURE — 99222 1ST HOSP IP/OBS MODERATE 55: CPT | Performed by: NURSE PRACTITIONER

## 2024-03-23 PROCEDURE — 6370000000 HC RX 637 (ALT 250 FOR IP): Performed by: HOSPITALIST

## 2024-03-23 PROCEDURE — 83605 ASSAY OF LACTIC ACID: CPT

## 2024-03-23 RX ORDER — TRAMADOL HYDROCHLORIDE 50 MG/1
50 TABLET ORAL 3 TIMES DAILY PRN
Status: DISCONTINUED | OUTPATIENT
Start: 2024-03-23 | End: 2024-03-26 | Stop reason: HOSPADM

## 2024-03-23 RX ORDER — KETOROLAC TROMETHAMINE 15 MG/ML
15 INJECTION, SOLUTION INTRAMUSCULAR; INTRAVENOUS EVERY 6 HOURS
Status: DISPENSED | OUTPATIENT
Start: 2024-03-23 | End: 2024-03-24

## 2024-03-23 RX ADMIN — TRAMADOL HYDROCHLORIDE 50 MG: 50 TABLET, COATED ORAL at 04:34

## 2024-03-23 RX ADMIN — APIXABAN 2.5 MG: 2.5 TABLET, FILM COATED ORAL at 09:09

## 2024-03-23 RX ADMIN — APIXABAN 2.5 MG: 2.5 TABLET, FILM COATED ORAL at 20:50

## 2024-03-23 RX ADMIN — KETOROLAC TROMETHAMINE 15 MG: 15 INJECTION, SOLUTION INTRAMUSCULAR; INTRAVENOUS at 23:48

## 2024-03-23 RX ADMIN — LEVOTHYROXINE SODIUM 100 MCG: 100 TABLET ORAL at 06:48

## 2024-03-23 RX ADMIN — SODIUM CHLORIDE, PRESERVATIVE FREE 10 ML: 5 INJECTION INTRAVENOUS at 22:32

## 2024-03-23 RX ADMIN — CEFTRIAXONE 1000 MG: 1 INJECTION, POWDER, FOR SOLUTION INTRAMUSCULAR; INTRAVENOUS at 20:50

## 2024-03-23 RX ADMIN — ACETAMINOPHEN 650 MG: 325 TABLET ORAL at 09:09

## 2024-03-23 ASSESSMENT — PAIN SCALES - GENERAL
PAINLEVEL_OUTOF10: 3
PAINLEVEL_OUTOF10: 5
PAINLEVEL_OUTOF10: 3
PAINLEVEL_OUTOF10: 9

## 2024-03-23 ASSESSMENT — PAIN DESCRIPTION - LOCATION
LOCATION: BACK

## 2024-03-23 ASSESSMENT — PAIN DESCRIPTION - ORIENTATION
ORIENTATION: RIGHT
ORIENTATION: RIGHT

## 2024-03-23 NOTE — CONSULTS
Palliative Care Department  [phone]  Palliative Care Initial Consult  Provider RAPHAEL Doshi CNP      PATIENT: Bora Hunt  : 1928  MRN: [id number]  ADMISSION DATE: 3/22/2024 12:39 PM  Referring Provider: Raymon Carter MD     Palliative Medicine was consulted on hospital day 1 for assistance with Goals of care    HPI:     Clinical Summary:Bora Hunt is a 89 y.o. y/o male with a history of hypertension, C. difficile, hypothyroidism, history of prostate cancer who presented to [facility] on 3/22/2024 with flank pain.  He was admitted for pyelonephritis and started on antibiotics and fluid.  He remains admitted to telemetry for further medical management.    ASSESSMENT/PLAN:     Pertinent Hospital Diagnoses     Complicated UTI versus acute pyelonephritis    Palliative Care Encounter / Counseling Regarding Goals of Care  Please see detailed goals of care discussion as below  At this time, Bora Hunt, Does have capacity for medical decision-making.  Capacity is time limited and situation/question specific  Outcome of goals of care meeting:  Encouraged patient to discuss advanced wishes with his wife  Continue full code  Code status Full Code  Advanced Directives: no POA or living will in epic  Surrogate/Legal NOK:  Alyssa Hunt (wife) [phone]    Spiritual assessment: no spiritual distress identified  Bereavement and grief: to be determined  Referrals to: none today    Thank you for the opportunity to participate in the care of Bora Hunt.     RAPHAEL Doshi CNP   Palliative Medicine     SUBJECTIVE:     Details of Conversation: Chart reviewed and met with the patient at the bedside.  No family was at the bedside.  I introduced palliative medicine and we had a discussion about goals of care and advanced directives.  The patient explains that he lives at home with his wife and they have a caregiver that comes about half the time.  He states that his wife would  be decision-maker if he were unable.  He believes that they have completed a living will and healthcare power of .  We discussed advanced wishes about resuscitation.  The patient expressed that he was unsure.  I explained the different CODE STATUS options.  Encouraged him to speak with his wife about some of these more advanced wishes.  He states that he believes him and his wife know what each other would want.  At this time CODE STATUS remain a full code.    Prognosis: Guarded    OBJECTIVE:     /66   Pulse 64   Temp 98.4 °F (36.9 °C) (Oral)   Resp 16   Ht 1.702 m (5' 7\")   Wt 46.7 kg (103 lb)   SpO2 95%   BMI 16.13 kg/m²     Physical Examination:  Gen: elderly, thin, NAD, awake, alert   HEENT: normocephalic, atraumatic, PERRL, EOMI,   Neck: trachea midline, no JVD  Lungs: respirations easy and not labored,   Heart: regular rate and rhythm, distant heart tones,   Abdomen: normoactive bowel sounds, soft, non-tender  Extremities: no clubbing, cyanosis or edema, moving all extremities    Skin: warm, dry without rashes, lesions, bruising  Neuro: awake, alert, oriented x 3, follows commands    Objective data reviewed: labs, images, records, medication use, vitals, and chart    Time/Communication  Greater than 50% of time spent, total 55 minutes in counseling and coordination of care at the bedside regarding goals of care.    Thank you for allowing Palliative Medicine to participate in the care of Bora Hunt.    Note: This report was completed using [email or web address].ru voiced recognition software.  Every effort has been made to ensure accuracy; however, inadvertent computerized transcription errors may be present.

## 2024-03-23 NOTE — PROGRESS NOTES
4 Eyes Skin Assessment     NAME:  Bora Hunt  YOB: 1928  MEDICAL RECORD NUMBER:  [id number]    The patient is being assessed for  Admission    I agree that at least one RN has performed a thorough Head to Toe Skin Assessment on the patient. ALL assessment sites listed below have been assessed.      Areas assessed by both nurses:    Head, Face, Ears, Shoulders, Back, Chest, Arms, Elbows, Hands, Sacrum. Buttock, Coccyx, Ischium, Legs. Feet and Heels, and Under Medical Devices         Does the Patient have a Wound? No noted wound(s)       Tyler Prevention initiated by RN: Yes  Wound Care Orders initiated by RN: No    Pressure Injury (Stage 3,4, Unstageable, DTI, NWPT, and Complex wounds) if present, place Wound referral order by RN under : No    New Ostomies, if present place, Ostomy referral order under : No     Nurse 1 eSignature: Electronically signed by Octavia Carlson RN on 3/23/24 at 12:23 AM EDT    **SHARE this note so that the co-signing nurse can place an eSignature**    Nurse 2 eSignature: Electronically signed by Conchita Alba RN on 3/23/24 at 3:33 AM EDT

## 2024-03-23 NOTE — PLAN OF CARE
Problem: ABCDS Injury Assessment  Goal: Absence of physical injury  Outcome: Progressing     Problem: Discharge Planning  Goal: Discharge to home or other facility with appropriate resources  Outcome: Progressing     Problem: Safety - Adult  Goal: Free from fall injury  Outcome: Progressing     Problem: Confusion  Goal: Confusion, delirium, dementia, or psychosis is improved or at baseline  Description: INTERVENTIONS:  1. Assess for possible contributors to thought disturbance, including medications, impaired vision or hearing, underlying metabolic abnormalities, dehydration, psychiatric diagnoses, and notify attending LIP  2. [place] high risk fall precautions, as indicated  3. Provide frequent short contacts to provide reality reorientation, refocusing and direction  4. Decrease environmental stimuli, including noise as appropriate  5. Monitor and intervene to maintain adequate nutrition, hydration, elimination, sleep and activity  6. If unable to ensure safety without constant attention obtain sitter and review sitter guidelines with assigned personnel  7. Initiate Psychosocial CNS and Spiritual Care consult, as indicated  Outcome: Progressing     Problem: Pain  Goal: Verbalizes/displays adequate comfort level or baseline comfort level  Outcome: Progressing

## 2024-03-23 NOTE — ED NOTES
ED to Inpatient Handoff Report    Notified Conchita that electronic handoff available and patient ready for transport to room 743.    Safety Risks: Memory Problems, Home safety issues, Difficulty with daily activities, and Risk of falls    Patient in Restraints: no    Constant Observer or Patient : no    Telemetry Monitoring Ordered :No           Order to transfer to unit without monitor:N/A        Deterioration Index Score:   Predictive Model Details          24 (Normal)  Factor Value    Calculated 3/22/2024 20:32 63% Age 89 years old    Deterioration Index Model 24% Respiratory rate 20     4% BUN abnormal (25 mg/dL)     3% Systolic 106     2% Pulse oximetry 99 %     2% Potassium 4.1 mmol/L     1% Pulse 81     0% Temperature 97.6 °F (36.4 °C)     0% WBC count 5.6 k/uL     0% Hematocrit 42.8 %     0% Sodium 139 mmol/L        Vitals:    03/22/24 1945 03/22/24 2000 03/22/24 2015 03/22/24 2030   BP: 122/78  (!) 106/94    Pulse: 83 99 (!) 113 81   Resp: 25 22 20 20   Temp:   97.6 °F (36.4 °C)    TempSrc:   Oral    SpO2:  90% 99%    Weight:       Height:             Opportunity for questions and clarification was provided.

## 2024-03-23 NOTE — PROGRESS NOTES
went to see patient while rounding. Patient was sleeping.   prayed a silent prayer for the patient and left devotional material and information about  services.   will attempt to follow up.

## 2024-03-23 NOTE — H&P
Internal Medicine History & Physical     Name: Bora Hunt  : 1928  Chief Complaint: Back Pain (Pt having lower back pain for past two days having cloudy urine )  Primary Care Physician: Kane Lei MD  Admission date: 3/22/2024  Date of service: 3/23/2024     History of Present Illness  Bora is a 89 y.o. year old male.  Patient came in with severe pain in the lower back and right lower abdomen for 2 to 3 days.  Patient states that the symptoms were not going away.  He was able to eat and drink.  Denies any troubles with moving bowels.  Denies any dysuria or hematuria.  Patient states that he was having more urinary frequency.  States nothing was helping his symptoms at home.  States the pain was so severe and sharp that he was having to crawl on the floor to get around.  Patient denies fevers but admits to chills.  No skin rashes or skin lesions.  No vision or hearing changes.  Patient admits to being hard of hearing.  Patient states pain is starting to improve here today.      ED course:   Initial blood work and imaging studies performed. Admission recommended by ED physician. Case was discussed with ED provider. Meds in ED consisted of the following:  Medications   apixaban (ELIQUIS) tablet 2.5 mg (2.5 mg Oral Given 3/23/24 0909)   levothyroxine (SYNTHROID) tablet 100 mcg (100 mcg Oral Given 3/23/24 0648)   sodium chloride flush 0.9 % injection 5-40 mL (10 mLs IntraVENous Given 3/22/24 2145)   sodium chloride flush 0.9 % injection 5-40 mL (has no administration in time range)   0.9 % sodium chloride infusion (has no administration in time range)   acetaminophen (TYLENOL) tablet 650 mg (650 mg Oral Given 3/23/24 0909)     Or   acetaminophen (TYLENOL) suppository 650 mg ( Rectal See Alternative 3/23/24 0909)   0.9 % sodium chloride infusion ( IntraVENous New Bag 3/22/24 2148)   polyethylene glycol (GLYCOLAX) packet 17 g (has no administration in time range)   cefTRIAXone (ROCEPHIN) 1,000 mg in

## 2024-03-24 LAB
ANION GAP SERPL CALCULATED.3IONS-SCNC: 7 MMOL/L (ref 7–16)
BASOPHILS # BLD: 0.01 K/UL (ref 0–0.2)
BASOPHILS NFR BLD: 0 % (ref 0–2)
BUN SERPL-MCNC: 20 MG/DL (ref 6–23)
CALCIUM SERPL-MCNC: 8.9 MG/DL (ref 8.6–10.2)
CHLORIDE SERPL-SCNC: 106 MMOL/L (ref 98–107)
CO2 SERPL-SCNC: 23 MMOL/L (ref 22–29)
CREAT SERPL-MCNC: 0.6 MG/DL (ref 0.7–1.2)
EOSINOPHIL # BLD: 0.22 K/UL (ref 0.05–0.5)
EOSINOPHILS RELATIVE PERCENT: 4 % (ref 0–6)
ERYTHROCYTE [DISTWIDTH] IN BLOOD BY AUTOMATED COUNT: 15 % (ref 11.5–15)
GFR SERPL CREATININE-BSD FRML MDRD: >60 ML/MIN/1.73M2
GLUCOSE SERPL-MCNC: 90 MG/DL (ref 74–99)
HCT VFR BLD AUTO: 37.5 % (ref 37–54)
HGB BLD-MCNC: 11.6 G/DL (ref 12.5–16.5)
IMM GRANULOCYTES # BLD AUTO: <0.03 K/UL (ref 0–0.58)
IMM GRANULOCYTES NFR BLD: 0 % (ref 0–5)
LYMPHOCYTES NFR BLD: 1.24 K/UL (ref 1.5–4)
LYMPHOCYTES RELATIVE PERCENT: 24 % (ref 20–42)
MCH RBC QN AUTO: 28.1 PG (ref 26–35)
MCHC RBC AUTO-ENTMCNC: 30.9 G/DL (ref 32–34.5)
MCV RBC AUTO: 90.8 FL (ref 80–99.9)
MICROORGANISM SPEC CULT: NO GROWTH
MONOCYTES NFR BLD: 0.62 K/UL (ref 0.1–0.95)
MONOCYTES NFR BLD: 12 % (ref 2–12)
NEUTROPHILS NFR BLD: 59 % (ref 43–80)
NEUTS SEG NFR BLD: 3.05 K/UL (ref 1.8–7.3)
PLATELET # BLD AUTO: 206 K/UL (ref 130–450)
PMV BLD AUTO: 9.5 FL (ref 7–12)
POTASSIUM SERPL-SCNC: 4.4 MMOL/L (ref 3.5–5)
RBC # BLD AUTO: 4.13 M/UL (ref 3.8–5.8)
SODIUM SERPL-SCNC: 136 MMOL/L (ref 132–146)
SPECIMEN DESCRIPTION: NORMAL
WBC OTHER # BLD: 5.2 K/UL (ref 4.5–11.5)

## 2024-03-24 PROCEDURE — 6360000002 HC RX W HCPCS: Performed by: HOSPITALIST

## 2024-03-24 PROCEDURE — 97530 THERAPEUTIC ACTIVITIES: CPT

## 2024-03-24 PROCEDURE — 97161 PT EVAL LOW COMPLEX 20 MIN: CPT

## 2024-03-24 PROCEDURE — 85025 COMPLETE CBC W/AUTO DIFF WBC: CPT

## 2024-03-24 PROCEDURE — 2580000003 HC RX 258: Performed by: HOSPITALIST

## 2024-03-24 PROCEDURE — 1200000000 HC SEMI PRIVATE

## 2024-03-24 PROCEDURE — 6370000000 HC RX 637 (ALT 250 FOR IP): Performed by: HOSPITALIST

## 2024-03-24 PROCEDURE — 80048 BASIC METABOLIC PNL TOTAL CA: CPT

## 2024-03-24 RX ADMIN — ACETAMINOPHEN 650 MG: 325 TABLET ORAL at 08:54

## 2024-03-24 RX ADMIN — POLYETHYLENE GLYCOL 3350 17 G: 17 POWDER, FOR SOLUTION ORAL at 08:54

## 2024-03-24 RX ADMIN — LEVOTHYROXINE SODIUM 100 MCG: 100 TABLET ORAL at 05:39

## 2024-03-24 RX ADMIN — KETOROLAC TROMETHAMINE 15 MG: 15 INJECTION, SOLUTION INTRAMUSCULAR; INTRAVENOUS at 05:40

## 2024-03-24 RX ADMIN — KETOROLAC TROMETHAMINE 15 MG: 15 INJECTION, SOLUTION INTRAMUSCULAR; INTRAVENOUS at 17:14

## 2024-03-24 RX ADMIN — SODIUM CHLORIDE, PRESERVATIVE FREE 10 ML: 5 INJECTION INTRAVENOUS at 08:54

## 2024-03-24 RX ADMIN — APIXABAN 2.5 MG: 2.5 TABLET, FILM COATED ORAL at 21:14

## 2024-03-24 RX ADMIN — APIXABAN 2.5 MG: 2.5 TABLET, FILM COATED ORAL at 08:54

## 2024-03-24 ASSESSMENT — PAIN - FUNCTIONAL ASSESSMENT: PAIN_FUNCTIONAL_ASSESSMENT: ACTIVITIES ARE NOT PREVENTED

## 2024-03-24 ASSESSMENT — PAIN SCALES - GENERAL
PAINLEVEL_OUTOF10: 0
PAINLEVEL_OUTOF10: 4
PAINLEVEL_OUTOF10: 3

## 2024-03-24 ASSESSMENT — PAIN DESCRIPTION - DESCRIPTORS: DESCRIPTORS: ACHING

## 2024-03-24 ASSESSMENT — PAIN DESCRIPTION - LOCATION
LOCATION: BACK
LOCATION: BACK

## 2024-03-24 ASSESSMENT — PAIN DESCRIPTION - ORIENTATION
ORIENTATION: MID;LOWER
ORIENTATION: RIGHT;LOWER

## 2024-03-24 NOTE — PROGRESS NOTES
(TYLENOL) suppository 650 mg  650 mg Rectal Q6H PRN Raymon Carter MD        0.9 % sodium chloride infusion   IntraVENous Continuous Raymon Carter MD 75 mL/hr at 03/22/24 2148 New Bag at 03/22/24 2148    polyethylene glycol (GLYCOLAX) packet 17 g  17 g Oral Daily PRN Raymon Carter MD   17 g at 03/24/24 0854    cefTRIAXone (ROCEPHIN) 1,000 mg in sterile water 10 mL IV syringe  1,000 mg IntraVENous Q24H Raymon Crater MD   1,000 mg at 03/23/24 2050       PRN Medications  traMADol, sodium chloride flush, sodium chloride, acetaminophen **OR** acetaminophen, polyethylene glycol    Objective  Most Recent Recorded Vitals  /64   Pulse 63   Temp 98.2 °F (36.8 °C) (Oral)   Resp 16   Ht 1.702 m (5' 7\")   Wt 46.7 kg (103 lb)   SpO2 98%   BMI 16.13 kg/m²   I/O last 3 completed shifts:  In: -   Out: 1100 [Urine:1100]  No intake/output data recorded.    Physical Exam:  General: Frail elderly male, AAO to person/place/time/purpose, NAD, no labored breathing  Eyes: conjunctivae/corneas clear, sclera non icteric  Skin: color/texture/turgor normal, no rashes or lesions  Lungs: CTAB, no retractions/use of accessory muscles, no vocal fremitus, no rhonchi, no crackle, no rales  Heart: regular rate, regular rhythm, no murmur  Abdomen: soft, NT, bowel sounds normal, right flank tenderness  Extremities: atraumatic, no edema, atrophic  Neurologic: cranial nerves 2-12 grossly intact, no slurred speech    Most Recent Labs  Lab Results   Component Value Date    WBC 5.2 03/24/2024    HGB 11.6 (L) 03/24/2024    HCT 37.5 03/24/2024     03/24/2024     03/24/2024    K 4.4 03/24/2024     03/24/2024    CREATININE 0.6 (L) 03/24/2024    BUN 20 03/24/2024    CO2 23 03/24/2024    GLUCOSE 90 03/24/2024    ALT 13 03/22/2024    AST 21 03/22/2024    INR 1.0 04/19/2023    APTT 31.7 12/17/2021    TSH 1.54 12/19/2023       CT ABDOMEN PELVIS WO CONTRAST Additional Contrast? None   Final Result   1. No nephrolithiasis or urolithiasis.    2. Large hiatal hernia with intrathoracic stomach configuration. Components   of transverse colon additionally contained within the hiatal hernia sac. No   mechanical obstructive process of bowel.   3. Large colonic stool burden extending through the redundant rectosigmoid   colon.   4. Sigmoid diverticulosis without acute diverticulitis.   5. Chronic appearing compression deformities T12 and L3 level with   kyphoplasty repair at the L3 level.   6. Lung bases demonstrate subpleural reticulation and band formation with   emphysematous changes partially observed. Findings suggest chronic   interstitial lung disease.   7. Descent of the pelvic floor with at least questionable cystocele and   rectocele appearance however no significant upstream dilatation or   hydronephrosis evident             Assessment   Active Hospital Problems    Diagnosis     Complicated UTI (urinary tract infection) [N39.0]     Pulmonary embolism on left (HCC) [I26.99]     Chronic anticoagulation [Z79.01]     Essential hypertension [I10]     History of prostate cancer [Z85.46]     [place] (hard of hearing) [H91.90]     Hypothyroidism [E03.9]          Plan  Complicated UTI versus acute pyelonephritis  Patient was having fevers initially on arrival with urinary symptoms and flank pain  Started on IV Rocephin  Symptoms beginning to improve  Consult ID, appreciate further recs  Consider discharge in the next 48 to 72 hours if improved  History of pulmonary embolism  Continue Eliquis as ordered  Hypertension  Blood pressure controlled on current medication  Hard of hearing  Chronic for patient  Ensure patient is able to understand all communication prior to leaving the room  Hypothyroidism  Continue supplementation    PT AM-PAC-- TBD  OT AM- PAC-- TBD    Follow labs   DVT prophylaxis  Please see orders for further management and care.  Discharge plan: TBD pending clinical progress    Electronically signed by Gabe Irby PA-C on 3/24/2024 at 10:42

## 2024-03-24 NOTE — CONSULTS
[facility] CONSULT NOTE    Reason for Consult: Complicated UTI   Requested by: Gabe Irby PA-C       Chief complaint: Right flank pain    History Obtained From: Patient and EMR      HISTORY OFPRESENT ILLNESS              The patient is a 89 y.o. male, hard of hearing, with history of hypertension, prostate cancer, C. difficile colitis, presented on 03/22 with right flank pain for 2-3 days. He reports no dysuria. Since admission, he has been afebrile with no leukocytosis.  CT abdomen and pelvis showed no nephrolithiasis or urolithiasis, large hiatal hernia with intrathoracic stoma configuration, no mechanical obstructive process of the bowel, large colonic stool burden extending through the redundant rectosigmoid colon, sigmoid diverticulosis without acute diverticulitis, chronic appearing compression deformities T12 and L3 level with kyphoplasty repair at L3 level, chronic interstitial lung disease, descent of pelvic floor with questionable cystocele and rectocele appearance but no significant upstream dilatation or hydronephrosis.  He underwent digital disimpaction at the ED where a large amount of stool was extracted.  Urinalysis showed pyuria of 10-20 WBCs and hematuria of 3-5 RBCs with urine culture showing no growth.  Blood cultures showed no growth to date.  Ceftriaxone was started on admission.  ID service was subsequently consulted for further recommendations.    Past Medical History  Past Medical History:   Diagnosis Date    C. difficile colitis 4/21/2023    History of prostate cancer 1970    radiation for 1 month and then seed implants     History of spinal fracture     [place] (hard of hearing)     Hypertension     Hypothyroidism 2004    Leg cramps     Prostate cancer (HCC)     Pulmonary emboli (HCC)        Current Facility-Administered Medications   Medication Dose Route Frequency Provider Last Rate Last Admin    traMADol (ULTRAM) tablet 50 mg  50 mg Oral TID PRN Anusha Solano APRN - CNP   50 mg at

## 2024-03-24 NOTE — PLAN OF CARE
Problem: ABCDS Injury Assessment  Goal: Absence of physical injury  Outcome: Progressing     Problem: Discharge Planning  Goal: Discharge to home or other facility with appropriate resources  Outcome: Progressing     Problem: Safety - Adult  Goal: Free from fall injury  Outcome: Progressing     Problem: Confusion  Goal: Confusion, delirium, dementia, or psychosis is improved or at baseline  Description: INTERVENTIONS:  1. Assess for possible contributors to thought disturbance, including medications, impaired vision or hearing, underlying metabolic abnormalities, dehydration, psychiatric diagnoses, and notify attending LIP  2. [place] high risk fall precautions, as indicated  3. Provide frequent short contacts to provide reality reorientation, refocusing and direction  4. Decrease environmental stimuli, including noise as appropriate  5. Monitor and intervene to maintain adequate nutrition, hydration, elimination, sleep and activity  6. If unable to ensure safety without constant attention obtain sitter and review sitter guidelines with assigned personnel  7. Initiate Psychosocial CNS and Spiritual Care consult, as indicated  Outcome: Progressing     Problem: Pain  Goal: Verbalizes/displays adequate comfort level or baseline comfort level  Outcome: Progressing     Problem: Skin/Tissue Integrity  Goal: Absence of new skin breakdown  Description: 1.  Monitor for areas of redness and/or skin breakdown  2.  Assess vascular access sites hourly  3.  Every 4-6 hours minimum:  Change oxygen saturation probe site  4.  Every 4-6 hours:  If on nasal continuous positive airway pressure, respiratory therapy assess nares and determine need for appliance change or resting period.  Outcome: Progressing     Problem: ABCDS Injury Assessment  Goal: Absence of physical injury  Outcome: Progressing     Problem: Discharge Planning  Goal: Discharge to home or other facility with appropriate resources  Outcome: Progressing     Problem:

## 2024-03-24 NOTE — PROGRESS NOTES
Initial Eval        Attending Provider:  Faisal Toledo DO    Evaluating PT:  Dixon Sanchze PT, RRT, CEAS    Room #:  0743/0743-A  Diagnosis:  Urinary tract infection without hematuria   Pertinent PMHx/PSHx:  See PMH  Procedure/Surgery:  NA  Precautions:  Falls, General,   Equipment Needs:  SC    SUBJECTIVE:    Pt lives with wife in a 2 story home with 2 stairs and 1 rail to enter.  There are 12 steps and 1 rail to 2nd floor bed and bath.  Pt ambulated with SC indep PTA.    OBJECTIVE:   Initial Evaluation  Date: 3/24/24 Treatment Short Term/ Long Term   Goals   Was pt agreeable to Eval/treatment? Yes     Does pt have pain? None     Bed Mobility  Rolling: Indep  Supine to sit: Indep  Sit to supine: NA  Scooting: Indep     Transfers Sit to stand: SBA/S  Stand to sit: S/Indep  Stand pivot: SBA/S  Indep all surfaces   Ambulation   20 feet with WW Indep/S-standing and urinating so stopped and return to chair.   SC/+ feet with S/Indep     Stair negotiation: ascended and descended  TBA-standing urinating and did not want to walk that far.   8-12 steps with 1 rail S/Indep   ROM WFL     MMT WFL     AM-PAC 6 Clicks 20/24, steps NA       Pt is A & O x 3   Sensation:  Pt denies numbness and tingling to extremities  Edema:  None LE's  Balance: sitting:Indep and standing: S/Indep with WW, SC use NA  Endurance: Functional     ASSESSMENT:    Comments:  In bed on arrival in no distress.  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  Transfer to EOB was indep w/o any delays or needing of assistance.  Sat on EOB w/o any hand-bed kick standing.  Transfer to stand form bed was also S/SBA but for most part indep/S.  No delays or LOB with bipedal standing transfer.  No dizziness expressed during/post all positional changes.  Amb with WW in room indep/S with reciprocal slower paced gait; began to urinate on floor and returned to bedside chair.  Indep megan-chair transfer with good safety skills.  Chair

## 2024-03-25 LAB
ANION GAP SERPL CALCULATED.3IONS-SCNC: 9 MMOL/L (ref 7–16)
BASOPHILS # BLD: 0.02 K/UL (ref 0–0.2)
BASOPHILS NFR BLD: 0 % (ref 0–2)
BUN SERPL-MCNC: 13 MG/DL (ref 6–23)
CALCIUM SERPL-MCNC: 9.5 MG/DL (ref 8.6–10.2)
CHLORIDE SERPL-SCNC: 102 MMOL/L (ref 98–107)
CO2 SERPL-SCNC: 27 MMOL/L (ref 22–29)
CREAT SERPL-MCNC: 0.6 MG/DL (ref 0.7–1.2)
EOSINOPHIL # BLD: 0.22 K/UL (ref 0.05–0.5)
EOSINOPHILS RELATIVE PERCENT: 4 % (ref 0–6)
ERYTHROCYTE [DISTWIDTH] IN BLOOD BY AUTOMATED COUNT: 14.6 % (ref 11.5–15)
GFR SERPL CREATININE-BSD FRML MDRD: 89 ML/MIN/1.73M2
GLUCOSE SERPL-MCNC: 110 MG/DL (ref 74–99)
HCT VFR BLD AUTO: 41.7 % (ref 37–54)
HGB BLD-MCNC: 13.3 G/DL (ref 12.5–16.5)
IMM GRANULOCYTES # BLD AUTO: <0.03 K/UL (ref 0–0.58)
IMM GRANULOCYTES NFR BLD: 0 % (ref 0–5)
LYMPHOCYTES NFR BLD: 1.07 K/UL (ref 1.5–4)
LYMPHOCYTES RELATIVE PERCENT: 21 % (ref 20–42)
MCH RBC QN AUTO: 28.4 PG (ref 26–35)
MCHC RBC AUTO-ENTMCNC: 31.9 G/DL (ref 32–34.5)
MCV RBC AUTO: 89.1 FL (ref 80–99.9)
MONOCYTES NFR BLD: 0.56 K/UL (ref 0.1–0.95)
MONOCYTES NFR BLD: 11 % (ref 2–12)
NEUTROPHILS NFR BLD: 64 % (ref 43–80)
NEUTS SEG NFR BLD: 3.33 K/UL (ref 1.8–7.3)
PLATELET # BLD AUTO: 236 K/UL (ref 130–450)
PMV BLD AUTO: 9.6 FL (ref 7–12)
POTASSIUM SERPL-SCNC: 3.8 MMOL/L (ref 3.5–5)
RBC # BLD AUTO: 4.68 M/UL (ref 3.8–5.8)
SODIUM SERPL-SCNC: 138 MMOL/L (ref 132–146)
WBC OTHER # BLD: 5.2 K/UL (ref 4.5–11.5)

## 2024-03-25 PROCEDURE — 6370000000 HC RX 637 (ALT 250 FOR IP): Performed by: NURSE PRACTITIONER

## 2024-03-25 PROCEDURE — 85025 COMPLETE CBC W/AUTO DIFF WBC: CPT

## 2024-03-25 PROCEDURE — 80048 BASIC METABOLIC PNL TOTAL CA: CPT

## 2024-03-25 PROCEDURE — 6370000000 HC RX 637 (ALT 250 FOR IP): Performed by: HOSPITALIST

## 2024-03-25 PROCEDURE — 1200000000 HC SEMI PRIVATE

## 2024-03-25 PROCEDURE — 36415 COLL VENOUS BLD VENIPUNCTURE: CPT

## 2024-03-25 PROCEDURE — 2580000003 HC RX 258: Performed by: HOSPITALIST

## 2024-03-25 PROCEDURE — 99232 SBSQ HOSP IP/OBS MODERATE 35: CPT | Performed by: NURSE PRACTITIONER

## 2024-03-25 RX ORDER — PSEUDOEPHEDRINE HCL 30 MG
100 TABLET ORAL 2 TIMES DAILY
Qty: 60 CAPSULE | Refills: 0 | Status: SHIPPED | OUTPATIENT
Start: 2024-03-25

## 2024-03-25 RX ORDER — LEVOTHYROXINE SODIUM 0.1 MG/1
TABLET ORAL
Qty: 90 TABLET | Refills: 1 | OUTPATIENT
Start: 2024-03-25

## 2024-03-25 RX ORDER — SENNOSIDES A AND B 8.6 MG/1
1 TABLET, FILM COATED ORAL NIGHTLY
Qty: 30 TABLET | Refills: 0 | Status: SHIPPED | OUTPATIENT
Start: 2024-03-25 | End: 2024-04-24

## 2024-03-25 RX ORDER — DOCUSATE SODIUM 100 MG/1
100 CAPSULE, LIQUID FILLED ORAL 2 TIMES DAILY
Status: DISCONTINUED | OUTPATIENT
Start: 2024-03-25 | End: 2024-03-26 | Stop reason: HOSPADM

## 2024-03-25 RX ORDER — SENNOSIDES A AND B 8.6 MG/1
1 TABLET, FILM COATED ORAL NIGHTLY
Status: DISCONTINUED | OUTPATIENT
Start: 2024-03-25 | End: 2024-03-26 | Stop reason: HOSPADM

## 2024-03-25 RX ADMIN — TRAMADOL HYDROCHLORIDE 50 MG: 50 TABLET, COATED ORAL at 22:15

## 2024-03-25 RX ADMIN — DOCUSATE SODIUM 100 MG: 100 CAPSULE, LIQUID FILLED ORAL at 22:15

## 2024-03-25 RX ADMIN — APIXABAN 2.5 MG: 2.5 TABLET, FILM COATED ORAL at 08:43

## 2024-03-25 RX ADMIN — APIXABAN 2.5 MG: 2.5 TABLET, FILM COATED ORAL at 22:15

## 2024-03-25 RX ADMIN — SENNOSIDES 8.6 MG: 8.6 TABLET, COATED ORAL at 22:15

## 2024-03-25 RX ADMIN — LEVOTHYROXINE SODIUM 100 MCG: 100 TABLET ORAL at 06:11

## 2024-03-25 RX ADMIN — DOCUSATE SODIUM 100 MG: 100 CAPSULE, LIQUID FILLED ORAL at 13:07

## 2024-03-25 RX ADMIN — SODIUM CHLORIDE, PRESERVATIVE FREE 10 ML: 5 INJECTION INTRAVENOUS at 22:17

## 2024-03-25 RX ADMIN — SODIUM CHLORIDE, PRESERVATIVE FREE 10 ML: 5 INJECTION INTRAVENOUS at 08:44

## 2024-03-25 ASSESSMENT — PAIN SCALES - GENERAL
PAINLEVEL_OUTOF10: 0

## 2024-03-25 ASSESSMENT — PAIN DESCRIPTION - LOCATION: LOCATION: BACK

## 2024-03-25 NOTE — PLAN OF CARE
Problem: ABCDS Injury Assessment  Goal: Absence of physical injury  Outcome: Progressing     Problem: Discharge Planning  Goal: Discharge to home or other facility with appropriate resources  Outcome: Progressing     Problem: Safety - Adult  Goal: Free from fall injury  Outcome: Progressing  Flowsheets (Taken 3/25/2024 6347)  Free From Fall Injury: Instruct family/caregiver on patient safety     Problem: Confusion  Goal: Confusion, delirium, dementia, or psychosis is improved or at baseline  Description: INTERVENTIONS:  1. Assess for possible contributors to thought disturbance, including medications, impaired vision or hearing, underlying metabolic abnormalities, dehydration, psychiatric diagnoses, and notify attending LIP  2. [place] high risk fall precautions, as indicated  3. Provide frequent short contacts to provide reality reorientation, refocusing and direction  4. Decrease environmental stimuli, including noise as appropriate  5. Monitor and intervene to maintain adequate nutrition, hydration, elimination, sleep and activity  6. If unable to ensure safety without constant attention obtain sitter and review sitter guidelines with assigned personnel  7. Initiate Psychosocial CNS and Spiritual Care consult, as indicated  Outcome: Progressing     Problem: Pain  Goal: Verbalizes/displays adequate comfort level or baseline comfort level  Outcome: Progressing     Problem: Skin/Tissue Integrity  Goal: Absence of new skin breakdown  Description: 1.  Monitor for areas of redness and/or skin breakdown  2.  Assess vascular access sites hourly  3.  Every 4-6 hours minimum:  Change oxygen saturation probe site  4.  Every 4-6 hours:  If on nasal continuous positive airway pressure, respiratory therapy assess nares and determine need for appliance change or resting period.  Outcome: Progressing

## 2024-03-25 NOTE — CARE COORDINATION
Met with patient and also spoke with son in law, Remi, and caregiver Megha about diagnosis and discharge plan of care. Pt admit for UTI-initially on iv antibiotics. ID consult- antibiotics stopped. Palliative care following. Pt lives with his spouse in 2 story home, bed and bath on 1st floor. Pt has rollator he uses, wheeled walker and cane. Walk in shower, toilet riser. Pt has 24 hour private duty caregivers. Receptive to home health, referral called to [facility]-used in past. Home care order for PT only. PCP is Dr Lei. Will follow-zoe

## 2024-03-25 NOTE — PROGRESS NOTES
Infectious Disease  Progress Note  [facility]    Chief Complaint: positive blood cx    Subjective:  I got reconsulted today for positive blood cx. Pt denied any fever or chills or abdominal pain. He feels better after stool disimpaction.     Scheduled Meds:   docusate sodium  100 mg Oral BID    senna  1 tablet Oral Nightly    apixaban  2.5 mg Oral BID    levothyroxine  100 mcg Oral QAM    sodium chloride flush  5-40 mL IntraVENous 2 times per day     Continuous Infusions:   sodium chloride       PRN Meds:traMADol, sodium chloride flush, sodium chloride, acetaminophen **OR** acetaminophen, polyethylene glycol    Prior to Admission medications    Medication Sig Start Date End Date Taking? Authorizing Provider   apixaban (ELIQUIS) 2.5 MG TABS tablet Take 1 tablet by mouth 2 times daily   Yes Elías Andrews MD   vitamin C (ASCORBIC ACID) 500 MG tablet Take 1 tablet by mouth every morning   Yes Elías Andrews MD   Multiple Vitamins-Minerals (PRESERVISION AREDS 2) CAPS Take 1 capsule by mouth every morning   Yes Elías Andrews MD   levothyroxine (SYNTHROID) 100 MCG tablet Take 1 tablet by mouth every morning   Yes Elías Andrews MD   lisinopril (PRINIVIL;ZESTRIL) 5 MG tablet Take 1 tablet by mouth every morning   Yes Elías Andrews MD   ciprofloxacin (CIPRO) 250 MG tablet Take 1 tablet by mouth 2 times daily for 7 days 3/20/24 3/27/24  Kane Lei MD   Turmeric 500 MG CAPS Take 1 capsule by mouth every morning    Elías Andrews MD   acetaminophen (TYLENOL) 500 MG tablet Take 1 tablet by mouth every morning    Elías Andrews MD        ROS:  As mentioned in subjective, all other systems negative      /74   Pulse 78   Temp 97.7 °F (36.5 °C) (Oral)   Resp 16   Ht 1.702 m (5' 7\")   Wt 46.7 kg (103 lb)   SpO2 96%   BMI 16.13 kg/m²     Physical Exam  Const/Neuro- unchanged, no signs of acute distress, Alert  ENMT- Within Normal Limits, Normocephalic, mucous membranes

## 2024-03-25 NOTE — ACP (ADVANCE CARE PLANNING)
Advance Care Planning   Healthcare Decision Maker:    Primary Decision Maker: Alyssa Hunt - [facility] - [phone]    Click here to complete Healthcare Decision Makers including selection of the Healthcare Decision Maker Relationship (ie \"Primary\").

## 2024-03-25 NOTE — PROGRESS NOTES
Palliative Care Department  [phone]  Palliative Care Progress Note  Provider RAPHAEL Doshi CNP      PATIENT: Bora Hunt  : 1928  MRN: [id number]  ADMISSION DATE: 3/22/2024 12:39 PM  Referring Provider: Raymon Carter MD     Palliative Medicine was consulted on hospital day 1 for assistance with Goals of care    HPI:     Clinical Summary:Bora Hunt is a 89 y.o. y/o male with a history of hypertension, C. difficile, hypothyroidism, history of prostate cancer who presented to [facility] on 3/22/2024 with flank pain.  He was admitted for pyelonephritis and started on antibiotics and fluid.  He remains admitted to telemetry for further medical management.    ASSESSMENT/PLAN:     Pertinent Hospital Diagnoses     Complicated UTI versus acute pyelonephritis    Palliative Care Encounter / Counseling Regarding Goals of Care  Please see detailed goals of care discussion as below  At this time, Bora Hunt, Does have capacity for medical decision-making.  Capacity is time limited and situation/question specific  Outcome of goals of care meeting:  Continue full code  Code status Full Code  Advanced Directives: no POA or living will in epic  Surrogate/Legal NOK:  Alyssa Hunt (wife) [phone]    Spiritual assessment: no spiritual distress identified  Bereavement and grief: to be determined  Referrals to: none today    Thank you for the opportunity to participate in the care of Bora Hunt.     RAPHAEL Doshi CNP   Palliative Medicine     SUBJECTIVE:     Details of Conversation: Chart reviewed and met with the patient and his son-in-law at the bedside.  Son-in-law explains that he just drove in from [place], and that his wife/patient's daughter has passed.  He confirms that wife is next of kin and would be surrogate decision maker.  We had discussion about CODE STATUS and advanced directives.  He explains that he will have a discussion with the patient's wife. Likely  discharge soon.     Prognosis: Guarded    OBJECTIVE:     /74   Pulse 78   Temp 97.7 °F (36.5 °C) (Oral)   Resp 16   Ht 1.702 m (5' 7\")   Wt 46.7 kg (103 lb)   SpO2 96%   BMI 16.13 kg/m²     Physical Examination:  Gen: elderly, thin, NAD, awake, alert   HEENT: normocephalic, atraumatic, PERRL, EOMI,   Neck: trachea midline, no JVD  Lungs: respirations easy and not labored,   Heart: regular rate and rhythm, distant heart tones,   Abdomen: normoactive bowel sounds, soft, non-tender  Extremities: no clubbing, cyanosis or edema, moving all extremities    Skin: warm, dry without rashes, lesions, bruising  Neuro: awake, alert, oriented x 3, follows commands    Objective data reviewed: labs, images, records, medication use, vitals, and chart    Time/Communication  Greater than 50% of time spent, total 35 minutes in counseling and coordination of care at the bedside regarding goals of care.    Thank you for allowing Palliative Medicine to participate in the care of Bora Hunt.    Note: This report was completed using computerConexus-IT voiced recognition software.  Every effort has been made to ensure accuracy; however, inadvertent computerized transcription errors may be present.

## 2024-03-25 NOTE — PROGRESS NOTES
Internal Medicine Progress Note    Patient's name: Bora Hunt  : 1928  Chief complaints (on day of admission): Back Pain (Pt having lower back pain for past two days having cloudy urine )  Admission date: 3/22/2024  Date of service: 3/25/2024   Room: [address]  Primary care physician: Kane Lei MD  Reason for visit: Follow-up for back pain, UTI    Subjective  Bora is seen lying in bed asleep in no distress.  He does easily awaken to voice.  He does report still having some pain in the right side of his back but overall feels like it is better.  He denies any nausea or vomiting.  Denies any shortness of breath or difficulty breathing.  Denies any fever or chills.  ID was in yesterday and given no growth on urine cultures discontinued antibiotics.  In discussion with nursing blood cultures this morning are resulting positive for gram-positive cocci in clusters - ID has been notified.  No other issues or concerns from nursing.    Review of Systems  Full 10 point review of systems negative unless mentioned above.    Hospital Medications  Current Facility-Administered Medications   Medication Dose Route Frequency Provider Last Rate Last Admin    traMADol (ULTRAM) tablet 50 mg  50 mg Oral TID PRN Anusha Solano APRN - CNP   50 mg at 24 0434    apixaban (ELIQUIS) tablet 2.5 mg  2.5 mg Oral BID Raymon Carter MD   2.5 mg at 24 0843    levothyroxine (SYNTHROID) tablet 100 mcg  100 mcg Oral QAM Raymon Carter MD   100 mcg at 24 0611    sodium chloride flush 0.9 % injection 5-40 mL  5-40 mL IntraVENous 2 times per day Raymon Carter MD   10 mL at 24 0844    sodium chloride flush 0.9 % injection 5-40 mL  5-40 mL IntraVENous PRN Raymon Carter MD        0.9 % sodium chloride infusion   IntraVENous PRN Raymon Carter MD        acetaminophen (TYLENOL) tablet 650 mg  650 mg Oral Q6H PRN Raymon Carter MD   650 mg at 24 0854    Or    acetaminophen (TYLENOL)

## 2024-03-25 NOTE — DISCHARGE SUMMARY
Internal Medicine Discharge Summary    NAME: Bora Hunt :  1928  MRN:  [id number] PCP:Kane Lei MD    ADMITTED: 3/22/2024   DISCHARGED: 3/26/2024    ADMITTING PHYSICIAN: Faisal Toledo DO    PCP: Kane Lei MD    CONSULTANT(S):   IP CONSULT TO PALLIATIVE CARE  IP CONSULT TO INFECTIOUS DISEASES  IP CONSULT TO HOME CARE NEEDS     ADMITTING DIAGNOSIS:   Hiatal hernia [K44.9]  Diverticulosis [K57.90]  Pyelonephritis [N12]  Acute pyelonephritis [N10]  General weakness [R53.1]  Fecal impaction in rectum (HCC) [K56.41]  Urinary tract infection without hematuria, site unspecified [N39.0]  Constipation, unspecified constipation type [K59.00]     Please see H&P for further details    DISCHARGE DIAGNOSES:   Active Hospital Problems    Diagnosis     Complicated UTI (urinary tract infection) [N39.0]     Pulmonary embolism on left (HCC) [I26.99]     Chronic anticoagulation [Z79.01]     Essential hypertension [I10]     History of prostate cancer [Z85.46]     [place] (hard of hearing) [H91.90]     Hypothyroidism [E03.9]        BRIEF HISTORY OF PRESENT ILLNESS: Bora Hunt is a 89 y.o. male patient of Kane Lei MD who  has a past medical history of C. difficile colitis, History of prostate cancer, History of spinal fracture, [place] (hard of hearing), Hypertension, Hypothyroidism, Leg cramps, Prostate cancer (HCC), and Pulmonary emboli (HCC). who originally had concerns including Back Pain (Pt having lower back pain for past two days having cloudy urine ). at presentation on 3/22/2024, and was found to have Hiatal hernia [K44.9]  Diverticulosis [K57.90]  Pyelonephritis [N12]  Acute pyelonephritis [N10]  General weakness [R53.1]  Fecal impaction in rectum (HCC) [K56.41]  Urinary tract infection without hematuria, site unspecified [N39.0]  Constipation, unspecified constipation type [K59.00] after workup.    Please see H&P for further details.    HOSPITAL COURSE:   The patient presented to the hospital  condition->Emergency Medical Condition (MA) FINDINGS: Unless otherwise indicated or stated incidental findings do not require dedicated follow-up imaging. Lower Chest: Lung bases are clear from consolidation or effusion demonstrating subpleural reticulation and band formation with emphysematous changes partially observed. Organs: Liver without focal lesion.  Adjacent interposed bowel.  Gallbladder unremarkable. Pancreas and spleen unremarkable.  Adrenals without nodule. Kidneys without suspicious renal lesion and no hydronephrosis.  No nephrolithiasis or urolithiasis. GI/Bowel: Large hiatal hernia with intrathoracic stomach configuration again identified.  Components of transverse colon additionally contained within the hiatal hernia sac.  No mechanical obstructive process of bowel.  Large colonic stool burden extending through the redundant rectosigmoid colon. Sigmoid diverticulosis without acute diverticulitis. Pelvis: No suspicious pelvic lesion or bulky pelvic adenopathy/free fluid. Descent of the pelvic floor with mild prominence or distension of the urinary bladder however no significant bladder wall thickening. Peritoneum/Retroperitoneum: No bulky retroperitoneal adenopathy. No suspicious peritoneal or mesenteric process Vasculature: Grossly normal caliber of abdominal aorta and vasculature Bones/Soft Tissues: Chronic appearing compression deformities T12 and L3 level similar to prior with kyphoplasty repair at the L3 level.  No acute osseous findings.     1. No nephrolithiasis or urolithiasis. 2. Large hiatal hernia with intrathoracic stomach configuration. Components of transverse colon additionally contained within the hiatal hernia sac. No mechanical obstructive process of bowel. 3. Large colonic stool burden extending through the redundant rectosigmoid colon. 4. Sigmoid diverticulosis without acute diverticulitis. 5. Chronic appearing compression deformities T12 and L3 level with kyphoplasty repair at

## 2024-03-25 NOTE — PROGRESS NOTES
CLINICAL PHARMACY NOTE: MEDS TO BEDS    Total # of Prescriptions Filled: 2   The following medications were delivered to the patient:  Colace 100 mg   Senna 8.6 mg       Additional Documentation:

## 2024-03-26 ENCOUNTER — TELEPHONE (OUTPATIENT)
Dept: FAMILY MEDICINE CLINIC | Age: 89
End: 2024-03-26

## 2024-03-26 VITALS
TEMPERATURE: 98.4 F | SYSTOLIC BLOOD PRESSURE: 110 MMHG | WEIGHT: 103 LBS | HEIGHT: 67 IN | BODY MASS INDEX: 16.17 KG/M2 | DIASTOLIC BLOOD PRESSURE: 64 MMHG | RESPIRATION RATE: 16 BRPM | OXYGEN SATURATION: 97 % | HEART RATE: 96 BPM

## 2024-03-26 DIAGNOSIS — N30.01 ACUTE CYSTITIS WITH HEMATURIA: ICD-10-CM

## 2024-03-26 LAB
ANION GAP SERPL CALCULATED.3IONS-SCNC: 10 MMOL/L (ref 7–16)
BASOPHILS # BLD: 0.02 K/UL (ref 0–0.2)
BASOPHILS NFR BLD: 0 % (ref 0–2)
BUN SERPL-MCNC: 18 MG/DL (ref 6–23)
CALCIUM SERPL-MCNC: 9.2 MG/DL (ref 8.6–10.2)
CHLORIDE SERPL-SCNC: 103 MMOL/L (ref 98–107)
CO2 SERPL-SCNC: 23 MMOL/L (ref 22–29)
CREAT SERPL-MCNC: 0.8 MG/DL (ref 0.7–1.2)
EOSINOPHIL # BLD: 0.26 K/UL (ref 0.05–0.5)
EOSINOPHILS RELATIVE PERCENT: 5 % (ref 0–6)
ERYTHROCYTE [DISTWIDTH] IN BLOOD BY AUTOMATED COUNT: 14.6 % (ref 11.5–15)
GFR SERPL CREATININE-BSD FRML MDRD: 83 ML/MIN/1.73M2
GLUCOSE SERPL-MCNC: 98 MG/DL (ref 74–99)
HCT VFR BLD AUTO: 36.4 % (ref 37–54)
HGB BLD-MCNC: 11.8 G/DL (ref 12.5–16.5)
IMM GRANULOCYTES # BLD AUTO: <0.03 K/UL (ref 0–0.58)
IMM GRANULOCYTES NFR BLD: 0 % (ref 0–5)
LYMPHOCYTES NFR BLD: 1.47 K/UL (ref 1.5–4)
LYMPHOCYTES RELATIVE PERCENT: 29 % (ref 20–42)
MCH RBC QN AUTO: 28.1 PG (ref 26–35)
MCHC RBC AUTO-ENTMCNC: 32.4 G/DL (ref 32–34.5)
MCV RBC AUTO: 86.7 FL (ref 80–99.9)
MONOCYTES NFR BLD: 0.72 K/UL (ref 0.1–0.95)
MONOCYTES NFR BLD: 14 % (ref 2–12)
NEUTROPHILS NFR BLD: 50 % (ref 43–80)
NEUTS SEG NFR BLD: 2.52 K/UL (ref 1.8–7.3)
PLATELET # BLD AUTO: 222 K/UL (ref 130–450)
PMV BLD AUTO: 9.1 FL (ref 7–12)
POTASSIUM SERPL-SCNC: 4.2 MMOL/L (ref 3.5–5)
RBC # BLD AUTO: 4.2 M/UL (ref 3.8–5.8)
SODIUM SERPL-SCNC: 136 MMOL/L (ref 132–146)
WBC OTHER # BLD: 5 K/UL (ref 4.5–11.5)

## 2024-03-26 PROCEDURE — 97165 OT EVAL LOW COMPLEX 30 MIN: CPT

## 2024-03-26 PROCEDURE — 85025 COMPLETE CBC W/AUTO DIFF WBC: CPT

## 2024-03-26 PROCEDURE — 6370000000 HC RX 637 (ALT 250 FOR IP): Performed by: HOSPITALIST

## 2024-03-26 PROCEDURE — 2580000003 HC RX 258: Performed by: HOSPITALIST

## 2024-03-26 PROCEDURE — 36415 COLL VENOUS BLD VENIPUNCTURE: CPT

## 2024-03-26 PROCEDURE — 80048 BASIC METABOLIC PNL TOTAL CA: CPT

## 2024-03-26 PROCEDURE — 6370000000 HC RX 637 (ALT 250 FOR IP): Performed by: NURSE PRACTITIONER

## 2024-03-26 RX ADMIN — APIXABAN 2.5 MG: 2.5 TABLET, FILM COATED ORAL at 08:20

## 2024-03-26 RX ADMIN — LEVOTHYROXINE SODIUM 100 MCG: 100 TABLET ORAL at 07:01

## 2024-03-26 RX ADMIN — DOCUSATE SODIUM 100 MG: 100 CAPSULE, LIQUID FILLED ORAL at 08:20

## 2024-03-26 RX ADMIN — SODIUM CHLORIDE, PRESERVATIVE FREE 10 ML: 5 INJECTION INTRAVENOUS at 08:20

## 2024-03-26 NOTE — CARE COORDINATION
Updated plan of care. Discharge home today. [facility] health notified and orders completed. Family to transport-[facility]

## 2024-03-26 NOTE — PROGRESS NOTES
Upon entering the room  observes patient in bed calm, alert and approachable. Son is sitting in chair near the patient's bed.  provided active listening, prayer, and nurtured hope.  left devotional material and information about  services. Patient seemed encouraged, engaged in conversation, and expressed gratitude. Chaplains will remain available to offer spiritual and emotional support as needed.

## 2024-03-26 NOTE — TELEPHONE ENCOUNTER
Kane Lei MD  You19 minutes ago (1:06 PM)       Yes we will follow.  Check and see if the patient was actually placed in palliative care as this is indicated on the hospital discharge.

## 2024-03-26 NOTE — PROGRESS NOTES
OCCUPATIONAL THERAPY INITIAL EVALUATION    [facility]   [address] [facility]        Date:3/26/2024                                                  Patient Name: Bora Hunt    MRN: [id number]    : 1928    Room: [address] [place] [address]    Evaluating OT: Shira Dave OTR/L #[id number]    Referring Provider:  Raymon Carter MD     Specific Provider Orders/Date:  OT Eval and Treat , 3/22/2024     Diagnosis:   1. Pyelonephritis    2. Urinary tract infection without hematuria, site unspecified    3. Constipation, unspecified constipation type    4. Hiatal hernia    5. Diverticulosis    6. Fecal impaction in rectum (HCC)    7. General weakness         Surgery: None      Pertinent Medical History: Prostate CA, [place], HTN, PE     Precautions:  Fall Risk, falls and alarm      Assessment of current deficits    [x] Functional mobility  [x]ADLs  [x] Strength               [x]Cognition    [x] Functional transfers   [x] IADLs         [x] Safety Awareness   [x]Endurance    [] Fine Coordination              [x] Balance      [] Vision/perception   []Sensation     []Gross Motor Coordination  [] ROM  [] Delirium                   [] Motor Control     OT PLAN OF CARE   OT POC based on physician orders, patient diagnosis and results of clinical assessment    Frequency/Duration 2-5 days/wk for 2-4 weeks PRN     Specific OT Treatment Interventions to include:   * Instruction/training on adapted ADL techniques and AE recommendations to increase functional independence within precautions       * Training on energy conservation strategies, correct breathing pattern and techniques to improve independence/tolerance for self-care routine  * Functional transfer/mobility training/DME recommendations for increased independence, safety, and fall prevention  * Patient/Family education to increase follow through with safety techniques and functional independence  * Recommendation of  for established goals.      Patient / Family Goal: N/A      Patient and/or family were instructed on functional diagnosis, prognosis/goals and OT plan of care. Demonstrated fair minus understanding.     Eval Complexity: Low    Time In: 9:17 AM   Time Out: 9:35 AM    Total Treatment Time: 0       Min Units   OT Eval Low 97165  X  1    OT Eval Medium 15854      OT Eval High 46049      OT Re-Eval 77824            ADL/Self Care 73545     Therapeutic Activities 98040       Therapeutic Ex 56881       Orthotic Management 57164       Manual 95876     Neuro Re-Ed 80604       Non-Billable Time        Evaluation Time additionally includes thorough review of current medical information, gathering information on past medical history/social history and prior level of function, interpretation of standardized testing/informal observation of tasks, assessment of data and development of plan of care and goals.        Evaluating OT: Shira Dave OTR/L #[id number]

## 2024-03-26 NOTE — PLAN OF CARE
Problem: ABCDS Injury Assessment  Goal: Absence of physical injury  Outcome: Completed     Problem: Discharge Planning  Goal: Discharge to home or other facility with appropriate resources  Outcome: Completed     Problem: Safety - Adult  Goal: Free from fall injury  Outcome: Completed     Problem: Confusion  Goal: Confusion, delirium, dementia, or psychosis is improved or at baseline  Description: INTERVENTIONS:  1. Assess for possible contributors to thought disturbance, including medications, impaired vision or hearing, underlying metabolic abnormalities, dehydration, psychiatric diagnoses, and notify attending LIP  2. [place] high risk fall precautions, as indicated  3. Provide frequent short contacts to provide reality reorientation, refocusing and direction  4. Decrease environmental stimuli, including noise as appropriate  5. Monitor and intervene to maintain adequate nutrition, hydration, elimination, sleep and activity  6. If unable to ensure safety without constant attention obtain sitter and review sitter guidelines with assigned personnel  7. Initiate Psychosocial CNS and Spiritual Care consult, as indicated  Outcome: Completed     Problem: Pain  Goal: Verbalizes/displays adequate comfort level or baseline comfort level  Outcome: Completed     Problem: Skin/Tissue Integrity  Goal: Absence of new skin breakdown  Description: 1.  Monitor for areas of redness and/or skin breakdown  2.  Assess vascular access sites hourly  3.  Every 4-6 hours minimum:  Change oxygen saturation probe site  4.  Every 4-6 hours:  If on nasal continuous positive airway pressure, respiratory therapy assess nares and determine need for appliance change or resting period.  Outcome: Completed

## 2024-03-26 NOTE — PROGRESS NOTES
6107 Discharge instructions given to Юлия (spouse), reviewed all home medications and follow up appointment. No questions at this time.

## 2024-03-26 NOTE — PROGRESS NOTES
P Quality Flow/Interdisciplinary Rounds Progress Note        Quality Flow Rounds held on March 26, 2024    Disciplines Attending:  Bedside Nurse, , , and Nursing Unit Leadership    Bora Hunt was admitted on 3/22/2024 12:39 PM    Anticipated Discharge Date:       Disposition:    Tyler Score:  Tyler Scale Score: 18    Readmission Risk              Risk of Unplanned Readmission:  14           Discussed patient goal for the day, patient clinical progression, and barriers to discharge.  The following Goal(s) of the Day/Commitment(s) have been identified:   Discharge planning      Lucy Bishop RN  March 26, 2024

## 2024-03-27 ENCOUNTER — CARE COORDINATION (OUTPATIENT)
Dept: CARE COORDINATION | Age: 89
End: 2024-03-27

## 2024-03-27 DIAGNOSIS — N10 ACUTE PYELONEPHRITIS: Primary | ICD-10-CM

## 2024-03-27 LAB
MICROORGANISM SPEC CULT: NORMAL
SERVICE CMNT-IMP: NORMAL
SPECIMEN DESCRIPTION: NORMAL

## 2024-03-27 PROCEDURE — 1111F DSCHRG MED/CURRENT MED MERGE: CPT | Performed by: INTERNAL MEDICINE

## 2024-03-27 NOTE — CARE COORDINATION
[facility]   4/10/2024  1:45 PM Kane Lei MD [facility]   3/18/2025  1:00 PM Manish Reynoso APRN - CNP [place]        No further follow-up call indicated, follow up care transition calls declined.      Morenita Celestin RN

## 2024-03-29 ENCOUNTER — APPOINTMENT (OUTPATIENT)
Dept: CT IMAGING | Age: 89
End: 2024-03-29
Payer: MEDICARE

## 2024-03-29 ENCOUNTER — HOSPITAL ENCOUNTER (EMERGENCY)
Age: 89
Discharge: HOME | End: 2024-03-29
Attending: EMERGENCY MEDICINE
Payer: MEDICARE

## 2024-03-29 ENCOUNTER — APPOINTMENT (OUTPATIENT)
Dept: GENERAL RADIOLOGY | Age: 89
End: 2024-03-29
Payer: MEDICARE

## 2024-03-29 VITALS
HEIGHT: 67 IN | WEIGHT: 103 LBS | RESPIRATION RATE: 16 BRPM | DIASTOLIC BLOOD PRESSURE: 75 MMHG | HEART RATE: 72 BPM | OXYGEN SATURATION: 93 % | SYSTOLIC BLOOD PRESSURE: 130 MMHG | BODY MASS INDEX: 16.17 KG/M2 | TEMPERATURE: 97.9 F

## 2024-03-29 DIAGNOSIS — S22.000A COMPRESSION FRACTURE OF BODY OF THORACIC VERTEBRA (HCC): Primary | ICD-10-CM

## 2024-03-29 DIAGNOSIS — S32.000A COMPRESSION FRACTURE OF LUMBAR VERTEBRA, UNSPECIFIED LUMBAR VERTEBRAL LEVEL, INITIAL ENCOUNTER (HCC): ICD-10-CM

## 2024-03-29 DIAGNOSIS — M54.9 CHRONIC BACK PAIN, UNSPECIFIED BACK LOCATION, UNSPECIFIED BACK PAIN LATERALITY: ICD-10-CM

## 2024-03-29 DIAGNOSIS — G89.29 CHRONIC BACK PAIN, UNSPECIFIED BACK LOCATION, UNSPECIFIED BACK PAIN LATERALITY: ICD-10-CM

## 2024-03-29 LAB
ACB COMPLEX DNA BLD POS QL NAA+NON-PROBE: NOT DETECTED
ANION GAP SERPL CALCULATED.3IONS-SCNC: 8 MMOL/L (ref 7–16)
B FRAGILIS DNA BLD POS QL NAA+NON-PROBE: NOT DETECTED
BASOPHILS # BLD: 0.03 K/UL (ref 0–0.2)
BASOPHILS NFR BLD: 1 % (ref 0–2)
BILIRUB UR QL STRIP: NEGATIVE
BIOFIRE TEST COMMENT: ABNORMAL
BUN SERPL-MCNC: 17 MG/DL (ref 6–23)
C ALBICANS DNA BLD POS QL NAA+NON-PROBE: NOT DETECTED
C AURIS DNA BLD POS QL NAA+NON-PROBE: NOT DETECTED
C GATTII+NEOFOR DNA BLD POS QL NAA+N-PRB: NOT DETECTED
C GLABRATA DNA BLD POS QL NAA+NON-PROBE: NOT DETECTED
C KRUSEI DNA BLD POS QL NAA+NON-PROBE: NOT DETECTED
C PARAP DNA BLD POS QL NAA+NON-PROBE: NOT DETECTED
C TROPICLS DNA BLD POS QL NAA+NON-PROBE: NOT DETECTED
CALCIUM SERPL-MCNC: 9.6 MG/DL (ref 8.6–10.2)
CHLORIDE SERPL-SCNC: 104 MMOL/L (ref 98–107)
CLARITY UR: CLEAR
CO2 SERPL-SCNC: 27 MMOL/L (ref 22–29)
COLOR UR: YELLOW
CREAT SERPL-MCNC: 0.6 MG/DL (ref 0.7–1.2)
E CLOAC COMP DNA BLD POS NAA+NON-PROBE: NOT DETECTED
E COLI DNA BLD POS QL NAA+NON-PROBE: NOT DETECTED
E FAECALIS DNA BLD POS QL NAA+NON-PROBE: NOT DETECTED
E FAECIUM DNA BLD POS QL NAA+NON-PROBE: NOT DETECTED
ENTEROBACTERALES DNA BLD POS NAA+N-PRB: NOT DETECTED
EOSINOPHIL # BLD: 0.12 K/UL (ref 0.05–0.5)
EOSINOPHILS RELATIVE PERCENT: 2 % (ref 0–6)
ERYTHROCYTE [DISTWIDTH] IN BLOOD BY AUTOMATED COUNT: 14.7 % (ref 11.5–15)
GFR SERPL CREATININE-BSD FRML MDRD: 87 ML/MIN/1.73M2
GLUCOSE SERPL-MCNC: 127 MG/DL (ref 74–99)
GLUCOSE UR STRIP-MCNC: NEGATIVE MG/DL
GP B STREP DNA BLD POS QL NAA+NON-PROBE: NOT DETECTED
HAEM INFLU DNA BLD POS QL NAA+NON-PROBE: NOT DETECTED
HCT VFR BLD AUTO: 38.1 % (ref 37–54)
HGB BLD-MCNC: 12.1 G/DL (ref 12.5–16.5)
HGB UR QL STRIP.AUTO: NEGATIVE
IMM GRANULOCYTES # BLD AUTO: <0.03 K/UL (ref 0–0.58)
IMM GRANULOCYTES NFR BLD: 0 % (ref 0–5)
K OXYTOCA DNA BLD POS QL NAA+NON-PROBE: NOT DETECTED
KETONES UR STRIP-MCNC: NEGATIVE MG/DL
KLEBSIELLA SP DNA BLD POS QL NAA+NON-PRB: NOT DETECTED
KLEBSIELLA SP DNA BLD POS QL NAA+NON-PRB: NOT DETECTED
L MONOCYTOG DNA BLD POS QL NAA+NON-PROBE: NOT DETECTED
LACTATE BLDV-SCNC: 1.5 MMOL/L (ref 0.5–2.2)
LEUKOCYTE ESTERASE UR QL STRIP: ABNORMAL
LYMPHOCYTES NFR BLD: 1.87 K/UL (ref 1.5–4)
LYMPHOCYTES RELATIVE PERCENT: 35 % (ref 20–42)
MCH RBC QN AUTO: 28.3 PG (ref 26–35)
MCHC RBC AUTO-ENTMCNC: 31.8 G/DL (ref 32–34.5)
MCV RBC AUTO: 89.2 FL (ref 80–99.9)
MICROORGANISM SPEC CULT: ABNORMAL
MICROORGANISM/AGENT SPEC: ABNORMAL
MONOCYTES NFR BLD: 0.54 K/UL (ref 0.1–0.95)
MONOCYTES NFR BLD: 10 % (ref 2–12)
N MEN DNA BLD POS QL NAA+NON-PROBE: NOT DETECTED
NEUTROPHILS NFR BLD: 52 % (ref 43–80)
NEUTS SEG NFR BLD: 2.77 K/UL (ref 1.8–7.3)
NITRITE UR QL STRIP: NEGATIVE
P AERUGINOSA DNA BLD POS NAA+NON-PROBE: NOT DETECTED
PH UR STRIP: 6.5 [PH] (ref 5–9)
PLATELET # BLD AUTO: 293 K/UL (ref 130–450)
PMV BLD AUTO: 9.1 FL (ref 7–12)
POTASSIUM SERPL-SCNC: 4.2 MMOL/L (ref 3.5–5)
PROT UR STRIP-MCNC: NEGATIVE MG/DL
PROTEUS SP DNA BLD POS QL NAA+NON-PROBE: NOT DETECTED
RBC # BLD AUTO: 4.27 M/UL (ref 3.8–5.8)
RBC #/AREA URNS HPF: ABNORMAL /HPF
S AUREUS DNA BLD POS QL NAA+NON-PROBE: NOT DETECTED
S AUREUS+CONS DNA BLD POS NAA+NON-PROBE: NOT DETECTED
S EPIDERMIDIS DNA BLD POS QL NAA+NON-PRB: NOT DETECTED
S LUGDUNENSIS DNA BLD POS QL NAA+NON-PRB: NOT DETECTED
S MALTOPHILIA DNA BLD POS QL NAA+NON-PRB: NOT DETECTED
S MARCESCENS DNA BLD POS NAA+NON-PROBE: NOT DETECTED
S PNEUM DNA BLD POS QL NAA+NON-PROBE: NOT DETECTED
S PYO DNA BLD POS QL NAA+NON-PROBE: NOT DETECTED
SALMONELLA DNA BLD POS QL NAA+NON-PROBE: NOT DETECTED
SERVICE CMNT-IMP: ABNORMAL
SODIUM SERPL-SCNC: 139 MMOL/L (ref 132–146)
SP GR UR STRIP: 1.01 (ref 1–1.03)
SPECIMEN DESCRIPTION: ABNORMAL
STREPTOCOCCUS DNA BLD POS NAA+NON-PROBE: NOT DETECTED
UROBILINOGEN UR STRIP-ACNC: 0.2 EU/DL (ref 0–1)
WBC #/AREA URNS HPF: ABNORMAL /HPF
WBC OTHER # BLD: 5.3 K/UL (ref 4.5–11.5)

## 2024-03-29 PROCEDURE — 99285 EMERGENCY DEPT VISIT HI MDM: CPT

## 2024-03-29 PROCEDURE — 83605 ASSAY OF LACTIC ACID: CPT

## 2024-03-29 PROCEDURE — 72131 CT LUMBAR SPINE W/O DYE: CPT

## 2024-03-29 PROCEDURE — 71275 CT ANGIOGRAPHY CHEST: CPT

## 2024-03-29 PROCEDURE — 6360000002 HC RX W HCPCS: Performed by: EMERGENCY MEDICINE

## 2024-03-29 PROCEDURE — 96374 THER/PROPH/DIAG INJ IV PUSH: CPT

## 2024-03-29 PROCEDURE — 6360000004 HC RX CONTRAST MEDICATION: Performed by: RADIOLOGY

## 2024-03-29 PROCEDURE — 85025 COMPLETE CBC W/AUTO DIFF WBC: CPT

## 2024-03-29 PROCEDURE — 80048 BASIC METABOLIC PNL TOTAL CA: CPT

## 2024-03-29 PROCEDURE — 71101 X-RAY EXAM UNILAT RIBS/CHEST: CPT

## 2024-03-29 PROCEDURE — 81001 URINALYSIS AUTO W/SCOPE: CPT

## 2024-03-29 PROCEDURE — 96376 TX/PRO/DX INJ SAME DRUG ADON: CPT

## 2024-03-29 RX ORDER — MORPHINE SULFATE 2 MG/ML
2 INJECTION, SOLUTION INTRAMUSCULAR; INTRAVENOUS
Status: DISCONTINUED | OUTPATIENT
Start: 2024-03-29 | End: 2024-03-29 | Stop reason: HOSPADM

## 2024-03-29 RX ORDER — MORPHINE SULFATE 4 MG/ML
4 INJECTION, SOLUTION INTRAMUSCULAR; INTRAVENOUS ONCE
Status: DISCONTINUED | OUTPATIENT
Start: 2024-03-29 | End: 2024-03-29 | Stop reason: HOSPADM

## 2024-03-29 RX ORDER — HYDROCODONE BITARTRATE AND ACETAMINOPHEN 5; 325 MG/1; MG/1
1-2 TABLET ORAL EVERY 6 HOURS PRN
Qty: 10 TABLET | Refills: 0 | Status: SHIPPED | OUTPATIENT
Start: 2024-03-29 | End: 2024-04-01 | Stop reason: SDUPTHER

## 2024-03-29 RX ADMIN — MORPHINE SULFATE 2 MG: 2 INJECTION, SOLUTION INTRAMUSCULAR; INTRAVENOUS at 11:39

## 2024-03-29 RX ADMIN — IOPAMIDOL 75 ML: 755 INJECTION, SOLUTION INTRAVENOUS at 15:30

## 2024-03-29 RX ADMIN — MORPHINE SULFATE 2 MG: 2 INJECTION, SOLUTION INTRAMUSCULAR; INTRAVENOUS at 15:46

## 2024-03-29 ASSESSMENT — PAIN DESCRIPTION - PAIN TYPE
TYPE: CHRONIC PAIN
TYPE: ACUTE PAIN;CHRONIC PAIN
TYPE: CHRONIC PAIN

## 2024-03-29 ASSESSMENT — PAIN DESCRIPTION - DESCRIPTORS
DESCRIPTORS: ACHING

## 2024-03-29 ASSESSMENT — PAIN SCALES - GENERAL
PAINLEVEL_OUTOF10: 4
PAINLEVEL_OUTOF10: 9
PAINLEVEL_OUTOF10: 8
PAINLEVEL_OUTOF10: 4
PAINLEVEL_OUTOF10: 6
PAINLEVEL_OUTOF10: 4

## 2024-03-29 ASSESSMENT — PAIN - FUNCTIONAL ASSESSMENT
PAIN_FUNCTIONAL_ASSESSMENT: PREVENTS OR INTERFERES SOME ACTIVE ACTIVITIES AND ADLS
PAIN_FUNCTIONAL_ASSESSMENT: 0-10
PAIN_FUNCTIONAL_ASSESSMENT: PREVENTS OR INTERFERES SOME ACTIVE ACTIVITIES AND ADLS
PAIN_FUNCTIONAL_ASSESSMENT: PREVENTS OR INTERFERES SOME ACTIVE ACTIVITIES AND ADLS

## 2024-03-29 ASSESSMENT — PAIN DESCRIPTION - ORIENTATION
ORIENTATION: RIGHT;MID;LOWER
ORIENTATION: RIGHT;LEFT;OTHER (COMMENT)
ORIENTATION: RIGHT;LEFT;LOWER
ORIENTATION: RIGHT;LEFT;LOWER;OTHER (COMMENT)
ORIENTATION: LEFT;RIGHT;LOWER
ORIENTATION: RIGHT;LEFT;LOWER

## 2024-03-29 ASSESSMENT — PAIN DESCRIPTION - LOCATION
LOCATION: BACK

## 2024-03-29 ASSESSMENT — PAIN DESCRIPTION - FREQUENCY: FREQUENCY: INTERMITTENT

## 2024-03-29 NOTE — CARE COORDINATION
Social Work/Transition of Care:    Pt presents to the ED due to Back Pain.  SW met with pt and caregiver at bedside, introduced self and role.  Caregiver concerned due to pt complaining of back pain and feels pt may require a DANIELLE.  Pt recently discharged from [facility] after a short term stay for UTI, pt evaluation complete pts AM PAC 20/24.  Pt would need updated Therapy evals in order to qualify for a DANIELLE placement.  Pt lives with his spouse in 2 story home, bed and bath on 1st floor.   Pt has rollator, wheeled walker and cane. Walk in shower, toilet riser.   Pt has 24 hour private duty caregivers. Pt currently receiving C with  [facility].  DIYA/JANINE to follow if admitted to assist with disposition.    Electronically signed by YASMANY rowland on 3/29/2024 at 3:43 PM

## 2024-03-29 NOTE — ED PROVIDER NOTES
use drugs.    Family History: family history includes Cancer in his brother and father; Heart Attack in his daughter; Stroke in his mother.     The patient’s home medications have been reviewed.    Allergies: Sulfa antibiotics and Egg-derived products    -------------------------------------------------- RESULTS -------------------------------------------------  All laboratory and radiology results have been personally reviewed by myself   LABS:  Results for orders placed or performed during the hospital encounter of 03/29/24   CBC with Auto Differential   Result Value Ref Range    WBC 5.3 4.5 - 11.5 k/uL    RBC 4.27 3.80 - 5.80 m/uL    Hemoglobin 12.1 (L) 12.5 - 16.5 g/dL    Hematocrit 38.1 37.0 - 54.0 %    MCV 89.2 80.0 - 99.9 fL    MCH 28.3 26.0 - 35.0 pg    MCHC 31.8 (L) 32.0 - 34.5 g/dL    RDW 14.7 11.5 - 15.0 %    Platelets 293 130 - 450 k/uL    MPV 9.1 7.0 - 12.0 fL    Neutrophils % 52 43.0 - 80.0 %    Lymphocytes % 35 20.0 - 42.0 %    Monocytes % 10 2.0 - 12.0 %    Eosinophils % 2 0 - 6 %    Basophils % 1 0.0 - 2.0 %    Immature Granulocytes 0 0.0 - 5.0 %    Neutrophils Absolute 2.77 1.80 - 7.30 k/uL    Lymphocytes Absolute 1.87 1.50 - 4.00 k/uL    Monocytes Absolute 0.54 0.10 - 0.95 k/uL    Eosinophils Absolute 0.12 0.05 - 0.50 k/uL    Basophils Absolute 0.03 0.00 - 0.20 k/uL    Absolute Immature Granulocyte <0.03 0.00 - 0.58 k/uL   Basic Metabolic Panel   Result Value Ref Range    Sodium 139 132 - 146 mmol/L    Potassium 4.2 3.5 - 5.0 mmol/L    Chloride 104 98 - 107 mmol/L    CO2 27 22 - 29 mmol/L    Anion Gap 8 7 - 16 mmol/L    Glucose 127 (H) 74 - 99 mg/dL    BUN 17 6 - 23 mg/dL    Creatinine 0.6 (L) 0.70 - 1.20 mg/dL    Est, Glom Filt Rate 87 >60 mL/min/1.73m2    Calcium 9.6 8.6 - 10.2 mg/dL   Urinalysis with Microscopic   Result Value Ref Range    Color, UA Yellow Yellow    Turbidity UA Clear Clear    Glucose, Ur NEGATIVE NEGATIVE mg/dL    Bilirubin Urine NEGATIVE NEGATIVE    Ketones, Urine NEGATIVE  EXAM--------------------------------------      Constitutional/General: Alert and oriented x3, well appearing, non toxic in NAD, Thin appearance   Head: Normocephalic and atraumatic  Eyes: PERRL, EOMI  Mouth: Oropharynx clear, handling secretions, no trismus  Neck: Supple, full ROM,   Pulmonary: Lungs clear to auscultation bilaterally, no wheezes, rales, or rhonchi. Not in respiratory distress  Cardiovascular:  Regular rate and rhythm, no murmurs, gallops, or rubs. 2+ distal pulses  Abdomen: Soft, non tender, non distended,   Extremities: Moves all extremities x 4. Warm and well perfused  Skin: warm and dry without rash  Neurologic: GCS 15, no focal deficits 5/5 motor strength BL.   Psych: Normal Affect    ------------------------------ ED COURSE/MEDICAL DECISION MAKING----------------------  Medications   iopamidol (ISOVUE-370) 76 % injection 75 mL (75 mLs IntraVENous Given 3/29/24 1530)             Medical Decision Making:    Bora Hunt is a 89 y.o. male presenting to the ED for presents with mid and lower back pain.  History is obtained from EMS and his granddaughter.  Patient had a history of backwards striking his back on a dresser.  Incident happened several weeks ago.  Patient's been more weak since he injury.  He has been complaining of mid back pain.  States it hurts to move also hurts trying to get dressed.  He has no mopped assist.  He denies hematuria.  He has no neurological deficits or other complaints.  Does report history of DVT and pulmonary embolism.  Recent history of C. difficile colitis April of last year.  He also suffers from hard of hearing and previous spinal fractures.  Daughter states he had kyphoplasty in the remote past. Current symptoms, beginning two weeks ago.  The complaint has been persistent, moderate in severity, and worsened by bending, twisting.      History From: Patient  and Daughter    CC/HPI Summary, DDx, ED Course, Reassessment, Tests Considered, Patient expectation:

## 2024-04-01 ENCOUNTER — TELEPHONE (OUTPATIENT)
Dept: FAMILY MEDICINE CLINIC | Age: 89
End: 2024-04-01

## 2024-04-01 DIAGNOSIS — S32.008S: Primary | ICD-10-CM

## 2024-04-01 DIAGNOSIS — S22.000A COMPRESSION FRACTURE OF BODY OF THORACIC VERTEBRA (HCC): ICD-10-CM

## 2024-04-01 RX ORDER — HYDROCODONE BITARTRATE AND ACETAMINOPHEN 5; 325 MG/1; MG/1
1-2 TABLET ORAL EVERY 6 HOURS PRN
Qty: 28 TABLET | Refills: 0 | Status: SHIPPED | OUTPATIENT
Start: 2024-04-01 | End: 2024-04-02 | Stop reason: SDUPTHER

## 2024-04-01 NOTE — TELEPHONE ENCOUNTER
Patient's caregiver called. She said he was just in [facility] again for back pain. The hospital suggested he get into pain management for his back pain. Can you place a referral? She was also asking for a couple more days of his pain medication to get him through until the referral gets placed?

## 2024-04-02 DIAGNOSIS — S22.000A COMPRESSION FRACTURE OF BODY OF THORACIC VERTEBRA (HCC): ICD-10-CM

## 2024-04-02 RX ORDER — HYDROCODONE BITARTRATE AND ACETAMINOPHEN 5; 325 MG/1; MG/1
1-2 TABLET ORAL EVERY 6 HOURS PRN
Qty: 28 TABLET | Refills: 0 | Status: SHIPPED | OUTPATIENT
Start: 2024-04-02 | End: 2024-04-09

## 2024-04-02 NOTE — TELEPHONE ENCOUNTER
Megha called to follow up on refill request for [place]. Beatriz did not receive the Rx. The chart shows that the script printed by mistake.     Updated Rx is pended, ready for review and to be signed.     Last Appointment:  12/19/2023  Future Appointments   Date Time Provider Department Center   4/10/2024  1:30 PM Kane Lei MD [facility]   4/10/2024  1:45 PM Kane Lei MD [facility]   3/18/2025  1:00 PM Manish Reynoso, APRN - CNP [place] [facility]      '

## 2024-04-04 ENCOUNTER — TELEPHONE (OUTPATIENT)
Dept: FAMILY MEDICINE CLINIC | Age: 89
End: 2024-04-04

## 2024-04-04 DIAGNOSIS — S32.008G: Primary | ICD-10-CM

## 2024-04-04 NOTE — TELEPHONE ENCOUNTER
Megha calling in stating that patient is not moving around well and was requesting a DME order for a hospital bed.  Informed Megha that patient would require an office visit with notes supportive for the need of hospital bed.  Patient does have an upcoming appointment on 4/10.  Megha is asking if there is anything we can do in the meantime?  Megha is unsure of what medical supply place she would like to use.  She realize she will have to pay a copay.  Advised her to check with patient's insurance.

## 2024-04-05 NOTE — TELEPHONE ENCOUNTER
Per Dr. Lei:      I put an order in the chart for a hospital bed.  There should be documentation enough from the hospital to warrant this.  Find out from the family which durable medical equipment company they would like to use.  And fax this to them.

## 2024-04-05 NOTE — TELEPHONE ENCOUNTER
Spoke with Shari Castillo to use [facility].  Hospital note, DME order, face sheet, and insurance information faxed to [facility] at this time.

## 2024-04-10 ENCOUNTER — OFFICE VISIT (OUTPATIENT)
Dept: FAMILY MEDICINE CLINIC | Age: 89
End: 2024-04-10
Payer: MEDICARE

## 2024-04-10 ENCOUNTER — OFFICE VISIT (OUTPATIENT)
Dept: PAIN MANAGEMENT | Age: 89
End: 2024-04-10
Payer: MEDICARE

## 2024-04-10 VITALS
OXYGEN SATURATION: 93 % | HEIGHT: 67 IN | SYSTOLIC BLOOD PRESSURE: 91 MMHG | BODY MASS INDEX: 16.16 KG/M2 | WEIGHT: 102.95 LBS | HEART RATE: 92 BPM | RESPIRATION RATE: 16 BRPM | DIASTOLIC BLOOD PRESSURE: 62 MMHG | TEMPERATURE: 97.3 F

## 2024-04-10 VITALS
TEMPERATURE: 99.3 F | WEIGHT: 116 LBS | BODY MASS INDEX: 18.17 KG/M2 | SYSTOLIC BLOOD PRESSURE: 90 MMHG | OXYGEN SATURATION: 98 % | HEART RATE: 83 BPM | DIASTOLIC BLOOD PRESSURE: 60 MMHG

## 2024-04-10 DIAGNOSIS — G89.29 CHRONIC RIGHT-SIDED LOW BACK PAIN WITHOUT SCIATICA: ICD-10-CM

## 2024-04-10 DIAGNOSIS — S22.080S COMPRESSION FRACTURE OF T11 VERTEBRA, SEQUELA: ICD-10-CM

## 2024-04-10 DIAGNOSIS — R30.0 DYSURIA: Primary | ICD-10-CM

## 2024-04-10 DIAGNOSIS — I26.99 PULMONARY EMBOLISM ON LEFT (HCC): ICD-10-CM

## 2024-04-10 DIAGNOSIS — S32.010S COMPRESSION FRACTURE OF L1 VERTEBRA, SEQUELA: ICD-10-CM

## 2024-04-10 DIAGNOSIS — M79.10 MYALGIA: ICD-10-CM

## 2024-04-10 DIAGNOSIS — S32.030S COMPRESSION FRACTURE OF L3 VERTEBRA, SEQUELA: ICD-10-CM

## 2024-04-10 DIAGNOSIS — S32.020S COMPRESSION FRACTURE OF L2 VERTEBRA, SEQUELA: ICD-10-CM

## 2024-04-10 DIAGNOSIS — E03.9 ACQUIRED HYPOTHYROIDISM: ICD-10-CM

## 2024-04-10 DIAGNOSIS — S32.008D CLOSED CRUSH FRACTURE OF LUMBAR VERTEBRA WITH ROUTINE HEALING, SUBSEQUENT ENCOUNTER: ICD-10-CM

## 2024-04-10 DIAGNOSIS — M19.90 ARTHRITIS: ICD-10-CM

## 2024-04-10 DIAGNOSIS — S22.080S COMPRESSION FRACTURE OF T12 VERTEBRA, SEQUELA: ICD-10-CM

## 2024-04-10 DIAGNOSIS — S32.040S COMPRESSION FRACTURE OF L4 VERTEBRA, SEQUELA: ICD-10-CM

## 2024-04-10 DIAGNOSIS — M54.50 CHRONIC RIGHT-SIDED LOW BACK PAIN WITHOUT SCIATICA: ICD-10-CM

## 2024-04-10 DIAGNOSIS — Z79.891 ENCOUNTER FOR LONG-TERM OPIATE ANALGESIC USE: ICD-10-CM

## 2024-04-10 DIAGNOSIS — G89.4 CHRONIC PAIN SYNDROME: ICD-10-CM

## 2024-04-10 DIAGNOSIS — I10 ESSENTIAL HYPERTENSION: ICD-10-CM

## 2024-04-10 DIAGNOSIS — Z79.01 CHRONIC ANTICOAGULATION: ICD-10-CM

## 2024-04-10 DIAGNOSIS — K59.03 DRUG-INDUCED CONSTIPATION: ICD-10-CM

## 2024-04-10 DIAGNOSIS — G89.4 CHRONIC PAIN SYNDROME: Primary | ICD-10-CM

## 2024-04-10 LAB
ALBUMIN SERPL-MCNC: 3.8 G/DL (ref 3.5–5.2)
ALP BLD-CCNC: 125 U/L (ref 40–129)
ALT SERPL-CCNC: 6 U/L (ref 0–40)
ANION GAP SERPL CALCULATED.3IONS-SCNC: 9 MMOL/L (ref 7–16)
AST SERPL-CCNC: 16 U/L (ref 0–39)
BASOPHILS ABSOLUTE: 0.04 K/UL (ref 0–0.2)
BASOPHILS RELATIVE PERCENT: 1 % (ref 0–2)
BILIRUB SERPL-MCNC: 0.3 MG/DL (ref 0–1.2)
BILIRUBIN, POC: NORMAL
BLOOD URINE, POC: NORMAL
BUN BLDV-MCNC: 27 MG/DL (ref 6–23)
CALCIUM SERPL-MCNC: 9.6 MG/DL (ref 8.6–10.2)
CHLORIDE BLD-SCNC: 101 MMOL/L (ref 98–107)
CLARITY, POC: CLEAR
CO2: 30 MMOL/L (ref 22–29)
COLOR, POC: YELLOW
CREAT SERPL-MCNC: 0.8 MG/DL (ref 0.7–1.2)
EOSINOPHILS ABSOLUTE: 0.09 K/UL (ref 0.05–0.5)
EOSINOPHILS RELATIVE PERCENT: 2 % (ref 0–6)
GFR SERPL CREATININE-BSD FRML MDRD: 82 ML/MIN/1.73M2
GLUCOSE BLD-MCNC: 97 MG/DL (ref 74–99)
GLUCOSE URINE, POC: NORMAL
HCT VFR BLD CALC: 39.9 % (ref 37–54)
HEMOGLOBIN: 12.6 G/DL (ref 12.5–16.5)
IMMATURE GRANULOCYTES %: 0 % (ref 0–5)
IMMATURE GRANULOCYTES ABSOLUTE: <0.03 K/UL (ref 0–0.58)
KETONES, POC: NORMAL
LEUKOCYTE EST, POC: NORMAL
LYMPHOCYTES ABSOLUTE: 1.42 K/UL (ref 1.5–4)
LYMPHOCYTES RELATIVE PERCENT: 31 % (ref 20–42)
MCH RBC QN AUTO: 28.4 PG (ref 26–35)
MCHC RBC AUTO-ENTMCNC: 31.6 G/DL (ref 32–34.5)
MCV RBC AUTO: 89.9 FL (ref 80–99.9)
MONOCYTES ABSOLUTE: 0.44 K/UL (ref 0.1–0.95)
MONOCYTES RELATIVE PERCENT: 10 % (ref 2–12)
NEUTROPHILS ABSOLUTE: 2.53 K/UL (ref 1.8–7.3)
NEUTROPHILS RELATIVE PERCENT: 56 % (ref 43–80)
NITRITE, POC: NORMAL
PDW BLD-RTO: 14.7 % (ref 11.5–15)
PH, POC: 6.5
PLATELET # BLD: 339 K/UL (ref 130–450)
PMV BLD AUTO: 9.3 FL (ref 7–12)
POTASSIUM SERPL-SCNC: 4.6 MMOL/L (ref 3.5–5)
PROTEIN, POC: NORMAL
RBC # BLD: 4.44 M/UL (ref 3.8–5.8)
SODIUM BLD-SCNC: 140 MMOL/L (ref 132–146)
SPECIFIC GRAVITY, POC: 1.01
TOTAL PROTEIN: 6.7 G/DL (ref 6.4–8.3)
UROBILINOGEN, POC: 0.2
WBC # BLD: 4.5 K/UL (ref 4.5–11.5)

## 2024-04-10 PROCEDURE — 99215 OFFICE O/P EST HI 40 MIN: CPT

## 2024-04-10 PROCEDURE — 1123F ACP DISCUSS/DSCN MKR DOCD: CPT | Performed by: INTERNAL MEDICINE

## 2024-04-10 PROCEDURE — 1123F ACP DISCUSS/DSCN MKR DOCD: CPT | Performed by: PAIN MEDICINE

## 2024-04-10 PROCEDURE — 99214 OFFICE O/P EST MOD 30 MIN: CPT | Performed by: INTERNAL MEDICINE

## 2024-04-10 PROCEDURE — 81002 URINALYSIS NONAUTO W/O SCOPE: CPT | Performed by: INTERNAL MEDICINE

## 2024-04-10 PROCEDURE — 99205 OFFICE O/P NEW HI 60 MIN: CPT | Performed by: PAIN MEDICINE

## 2024-04-10 RX ORDER — HYDROCODONE BITARTRATE AND ACETAMINOPHEN 5; 325 MG/1; MG/1
0.5 TABLET ORAL 4 TIMES DAILY PRN
Qty: 60 TABLET | Refills: 0 | Status: SHIPPED | OUTPATIENT
Start: 2024-04-10 | End: 2024-05-10

## 2024-04-10 ASSESSMENT — PATIENT HEALTH QUESTIONNAIRE - PHQ9
SUM OF ALL RESPONSES TO PHQ QUESTIONS 1-9: 0
SUM OF ALL RESPONSES TO PHQ QUESTIONS 1-9: 0
2. FEELING DOWN, DEPRESSED OR HOPELESS: NOT AT ALL
SUM OF ALL RESPONSES TO PHQ9 QUESTIONS 1 & 2: 0
SUM OF ALL RESPONSES TO PHQ QUESTIONS 1-9: 0
1. LITTLE INTEREST OR PLEASURE IN DOING THINGS: NOT AT ALL
SUM OF ALL RESPONSES TO PHQ QUESTIONS 1-9: 0

## 2024-04-10 NOTE — PROGRESS NOTES
The patient was recently in the emergency room secondary to severe back pain.  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  Some of them are old and some of them are new.  The patient did see pain management this morning and an MRI of the spine was ordered.  The patient is having extreme difficulty trying to be comfortable.  He just recently had his narcotic analgesics changed to 4 times a day.  I did discuss possibly using a longer acting medication such as MS Contin and using as needed Norco or another pain management medication.  The patient is not having bowel or bladder incontinence but he is having constipation secondary to the narcotic use.  The patient was just started on MiraLAX this morning but of asked the caregiver to start twice a day MiraLAX and twice a day Ducolax.  I think this combination would be very helpful and it would help with urinary symptoms.  The patient is not having fever, chills, sweats.  He is not having blood in the urine at this point.  He does have a history of multiple recurrent urinary tract infections.  We did talk about suppressive therapies at this point but my concern would be growth of the resistant organism.  Patient actually has gained several pounds since his last visit.  She states his appetite is okay but really not significant.  He is chronically anticoagulated.  There is been no bleeding or bruising.  He has not had symptoms of recurrent pulmonary embolism or DVT.  He previously was evaluated by urology secondary to his prostate cancer.    Patient Active Problem List   Diagnosis    Hypothyroidism    History of prostate cancer    [place] (hard of hearing)    History of DVT (deep vein thrombosis)    Hx of pulmonary embolus    Essential hypertension    Carotid artery stenosis    Chronic anticoagulation    Prostate cancer (HCC)    Arthritis    Other chest pain    Age-related osteoporosis without current pathological fracture

## 2024-04-10 NOTE — PROGRESS NOTES
Bora Hunt presents to the [facility] on 4/10/2024. Bora is complaining of pain low back. The pain is constant. The pain is described as unbearable and grabbing. Pain is rated on his best day at a 2, on his worst day at a 10, and on average at a 3 on the VAS scale. He took his last dose of Norco today.     Any procedures since your last visit: No.    Pacemaker or defibrillator: No.    He is not on NSAIDS and is  on anticoagulation medications to include Eliquis and is managed by Kane Lei MD.     Medication Contract and Consent for Opioid Use Documents Filed        No documents found                    BP 91/62   Pulse 92   Temp 97.3 °F (36.3 °C) (Infrared)   Resp 16   Ht 1.702 m (5' 7\")   Wt 46.7 kg (102 lb 15.3 oz)   SpO2 93%   BMI 16.13 kg/m²      No LMP for male patient.  
   DENTAL SURGERY  2024    dr luna    HAND TENDON SURGERY      HERNIA REPAIR  age 12    INGUINAL HERNIA REPAIR  06/07/2012    RIGHT , WITH MESH    KNEE ARTHROSCOPY      MASTOID SURGERY      OTHER SURGICAL HISTORY  approx 8 yrs ago    prostate radiation and implanted seeds    OTHER SURGICAL HISTORY Right 04/30/2017    head laceration     SKIN CANCER EXCISION      Dr Gaspar       Prior to Admission medications    Medication Sig Start Date End Date Taking? Authorizing Provider   docusate sodium (COLACE, DULCOLAX) 100 MG CAPS Take 100 mg by mouth 2 times daily 3/25/24  Yes Lin Garcia APRN - CNP   senna (SENOKOT) 8.6 MG tablet Take 1 tablet by mouth nightly 3/25/24 4/24/24 Yes Lin Garcia APRN - CNP   apixaban (ELIQUIS) 2.5 MG TABS tablet Take 1 tablet by mouth 2 times daily   Yes Elías Andrews MD   vitamin C (ASCORBIC ACID) 500 MG tablet Take 1 tablet by mouth every morning   Yes Elías Andrews MD   Multiple Vitamins-Minerals (PRESERVISION AREDS 2) CAPS Take 1 capsule by mouth every morning   Yes Elías Andrews MD   levothyroxine (SYNTHROID) 100 MCG tablet Take 1 tablet by mouth every morning   Yes Elías Andrews MD   Turmeric 500 MG CAPS Take 1 capsule by mouth every morning   Yes Elías Andrews MD   acetaminophen (TYLENOL) 500 MG tablet Take 1 tablet by mouth every morning   Yes Elías Andrews MD       Allergies   Allergen Reactions    Sulfa Antibiotics Myalgia      WEAKNESS & VIOLENTLY ILL    Egg-Derived Products Other (See Comments)     VIOLENTLY ILL X 10 DAYS.  NO FLU VACCINE       Social History     Socioeconomic History    Marital status:      Spouse name: Not on file    Number of children: Not on file    Years of education: Not on file    Highest education level: Not on file   Occupational History    Occupation: business owner   Tobacco Use    Smoking status: Never    Smokeless tobacco: Never   Vaping Use    Vaping Use: Never used   Substance and

## 2024-04-11 ENCOUNTER — TELEPHONE (OUTPATIENT)
Dept: FAMILY MEDICINE CLINIC | Age: 89
End: 2024-04-11

## 2024-04-11 LAB
SEND OUT REPORT: NORMAL
TEST NAME: NORMAL

## 2024-04-11 RX ORDER — LEVOTHYROXINE SODIUM 0.1 MG/1
100 TABLET ORAL DAILY
Qty: 90 TABLET | Refills: 0 | Status: SHIPPED | OUTPATIENT
Start: 2024-04-11

## 2024-04-11 RX ORDER — APIXABAN 2.5 MG/1
2.5 TABLET, FILM COATED ORAL 2 TIMES DAILY
Qty: 60 TABLET | Refills: 3 | Status: SHIPPED | OUTPATIENT
Start: 2024-04-11

## 2024-04-11 NOTE — TELEPHONE ENCOUNTER
[employer]        She is calling about the order they received for the Hospital Bed.     They are not a DME company and can't provide this for Bora.     She said the order would need to be sent to a DME company.

## 2024-04-11 NOTE — TELEPHONE ENCOUNTER
Megha requesting refills.     Last Appointment:  4/10/2024  Future Appointments   Date Time Provider Department Center   5/10/2024  3:00 PM Christine Cerrato DO [facility]   3/18/2025  1:00 PM Manish Reynoso APRN - CNP [place] [facility]

## 2024-04-12 LAB
CULTURE: NORMAL
SPECIMEN DESCRIPTION: NORMAL

## 2024-04-15 ENCOUNTER — TELEPHONE (OUTPATIENT)
Dept: FAMILY MEDICINE CLINIC | Age: 89
End: 2024-04-15

## 2024-04-15 NOTE — TELEPHONE ENCOUNTER
Anum from [facility] calling to see if office visit from 4/10 can be amended to include hospital bed is needed for positioning the body in ways not possible for an ordinary bed in order to alleviate pain.  Also, need an updated Rx dated 4/10.  Please advise.

## 2024-04-16 NOTE — TELEPHONE ENCOUNTER
Per Dr. Lei:    I addended the note.  Please fax the addended note to the durable medical equipment company

## 2024-04-19 LAB
SEND OUT REPORT: NORMAL
TEST NAME: NORMAL

## 2024-04-22 ENCOUNTER — TELEPHONE (OUTPATIENT)
Dept: FAMILY MEDICINE CLINIC | Age: 89
End: 2024-04-22

## 2024-04-22 ENCOUNTER — TELEPHONE (OUTPATIENT)
Dept: PRIMARY CARE CLINIC | Age: 89
End: 2024-04-22

## 2024-04-22 DIAGNOSIS — Z85.46 HISTORY OF PROSTATE CANCER: Primary | ICD-10-CM

## 2024-04-22 DIAGNOSIS — R52 INTRACTABLE PAIN: ICD-10-CM

## 2024-04-22 NOTE — TELEPHONE ENCOUNTER
Megha calling in stating that she spoke with patient's wife re: hospice.  She is agreeable to hospice.  Can you please place referral for hospice with [facility]?

## 2024-04-22 NOTE — TELEPHONE ENCOUNTER
Referral to [facility].  Provided wife and caregiver's information.  They will call to get services started.    Electronically signed by Marta Camejo LPN on 4/22/2024 at 10:54 AM

## 2024-04-22 NOTE — TELEPHONE ENCOUNTER
Sally from [facility] called and said they go the referral, but they need to have a dx code as reason he is being referred to hospice. She is trying to guess from the ov that was sent, but she said nothing it standing out to her that would be the reason at this time. Her number is [phone]

## 2024-04-22 NOTE — TELEPHONE ENCOUNTER
Per Dr. Lei:    I placed a referral in the chart.  Please try to expedite this to [facility].

## 2024-06-05 ENCOUNTER — TELEPHONE (OUTPATIENT)
Dept: FAMILY MEDICINE CLINIC | Age: 89
End: 2024-06-05

## 2024-06-05 DIAGNOSIS — S32.008D CLOSED CRUSH FRACTURE OF LUMBAR VERTEBRA WITH ROUTINE HEALING, SUBSEQUENT ENCOUNTER: Primary | ICD-10-CM

## 2024-06-05 NOTE — TELEPHONE ENCOUNTER
[facility] needs an new Rx sent for the hospital bed for prior authorization.  Rx can not be dated prior to office note.  Order pending for your review.  Please sign off.     Please fax to [facility] internal fax# [phone]